# Patient Record
Sex: MALE | Race: WHITE | NOT HISPANIC OR LATINO | Employment: OTHER | URBAN - METROPOLITAN AREA
[De-identification: names, ages, dates, MRNs, and addresses within clinical notes are randomized per-mention and may not be internally consistent; named-entity substitution may affect disease eponyms.]

---

## 2017-08-11 ENCOUNTER — HOSPITAL ENCOUNTER (INPATIENT)
Facility: HOSPITAL | Age: 72
LOS: 3 days | Discharge: HOME | DRG: 394 | End: 2017-08-15
Attending: EMERGENCY MEDICINE | Admitting: INTERNAL MEDICINE
Payer: COMMERCIAL

## 2017-08-11 DIAGNOSIS — R19.7 DIARRHEA: ICD-10-CM

## 2017-08-11 DIAGNOSIS — C78.7 COLON CANCER METASTASIZED TO LIVER (HCC): ICD-10-CM

## 2017-08-11 DIAGNOSIS — C18.9 COLON CANCER METASTASIZED TO LIVER (HCC): ICD-10-CM

## 2017-08-11 DIAGNOSIS — K52.9 COLITIS, ACUTE: Primary | ICD-10-CM

## 2017-08-11 DIAGNOSIS — D72.829 LEUKOCYTOSIS: ICD-10-CM

## 2017-08-11 LAB
ALBUMIN SERPL BCP-MCNC: 3 G/DL (ref 3.5–5)
ALP SERPL-CCNC: 220 U/L (ref 46–116)
ALT SERPL W P-5'-P-CCNC: 26 U/L (ref 12–78)
ANION GAP SERPL CALCULATED.3IONS-SCNC: 12 MMOL/L (ref 4–13)
AST SERPL W P-5'-P-CCNC: 21 U/L (ref 5–45)
BILIRUB SERPL-MCNC: 0.8 MG/DL (ref 0.2–1)
BUN SERPL-MCNC: 24 MG/DL (ref 5–25)
CALCIUM SERPL-MCNC: 8.6 MG/DL (ref 8.3–10.1)
CHLORIDE SERPL-SCNC: 98 MMOL/L (ref 100–108)
CO2 SERPL-SCNC: 24 MMOL/L (ref 21–32)
CREAT SERPL-MCNC: 1.18 MG/DL (ref 0.6–1.3)
DOHLE BOD BLD QL SMEAR: PRESENT
ERYTHROCYTE [DISTWIDTH] IN BLOOD BY AUTOMATED COUNT: 14.2 % (ref 11.6–15.1)
GFR SERPL CREATININE-BSD FRML MDRD: 61 ML/MIN/1.73SQ M
GLUCOSE SERPL-MCNC: 222 MG/DL (ref 65–140)
HCT VFR BLD AUTO: 46.9 % (ref 42–52)
HGB BLD-MCNC: 15.8 G/DL (ref 14–18)
LIPASE SERPL-CCNC: 162 U/L (ref 73–393)
LYMPHOCYTES # BLD AUTO: 0.53 THOUSAND/UL (ref 0.6–4.47)
LYMPHOCYTES # BLD AUTO: 1 %
MCH RBC QN AUTO: 31.7 PG (ref 27–31)
MCHC RBC AUTO-ENTMCNC: 33.8 G/DL (ref 31.4–37.4)
MCV RBC AUTO: 94 FL (ref 82–98)
MONOCYTES # BLD AUTO: 0 THOUSAND/UL (ref 0–1.22)
MONOCYTES NFR BLD AUTO: 0 % (ref 4–12)
NEUTS BAND NFR BLD MANUAL: 3 % (ref 0–8)
NEUTS SEG # BLD: 52.47 THOUSAND/UL (ref 1.81–6.82)
NEUTS SEG NFR BLD AUTO: 96 %
PLATELET # BLD AUTO: 127 THOUSANDS/UL (ref 130–400)
PLATELET BLD QL SMEAR: ABNORMAL
PMV BLD AUTO: 8.3 FL (ref 8.9–12.7)
POTASSIUM SERPL-SCNC: 3.8 MMOL/L (ref 3.5–5.3)
PROT SERPL-MCNC: 6.3 G/DL (ref 6.4–8.2)
RBC # BLD AUTO: 5 MILLION/UL (ref 4.7–6.1)
SODIUM SERPL-SCNC: 134 MMOL/L (ref 136–145)
TOTAL CELLS COUNTED SPEC: 100
TOXIC GRANULES BLD QL SMEAR: PRESENT
TROPONIN I SERPL-MCNC: <0.02 NG/ML
WBC # BLD AUTO: 53 THOUSAND/UL (ref 4.8–10.8)

## 2017-08-11 PROCEDURE — 96365 THER/PROPH/DIAG IV INF INIT: CPT

## 2017-08-11 PROCEDURE — 83690 ASSAY OF LIPASE: CPT | Performed by: EMERGENCY MEDICINE

## 2017-08-11 PROCEDURE — 80053 COMPREHEN METABOLIC PANEL: CPT | Performed by: EMERGENCY MEDICINE

## 2017-08-11 PROCEDURE — 85007 BL SMEAR W/DIFF WBC COUNT: CPT | Performed by: EMERGENCY MEDICINE

## 2017-08-11 PROCEDURE — 36415 COLL VENOUS BLD VENIPUNCTURE: CPT | Performed by: EMERGENCY MEDICINE

## 2017-08-11 PROCEDURE — 84484 ASSAY OF TROPONIN QUANT: CPT | Performed by: EMERGENCY MEDICINE

## 2017-08-11 PROCEDURE — 96375 TX/PRO/DX INJ NEW DRUG ADDON: CPT

## 2017-08-11 PROCEDURE — 93005 ELECTROCARDIOGRAM TRACING: CPT | Performed by: EMERGENCY MEDICINE

## 2017-08-11 PROCEDURE — 85027 COMPLETE CBC AUTOMATED: CPT | Performed by: EMERGENCY MEDICINE

## 2017-08-11 PROCEDURE — 96361 HYDRATE IV INFUSION ADD-ON: CPT

## 2017-08-11 RX ORDER — MORPHINE SULFATE 4 MG/ML
4 INJECTION, SOLUTION INTRAMUSCULAR; INTRAVENOUS ONCE
Status: COMPLETED | OUTPATIENT
Start: 2017-08-11 | End: 2017-08-11

## 2017-08-11 RX ORDER — PROCHLORPERAZINE MALEATE 5 MG/1
5 TABLET ORAL EVERY 8 HOURS PRN
COMMUNITY
End: 2019-12-09

## 2017-08-11 RX ORDER — TRAMADOL HYDROCHLORIDE 50 MG/1
50 TABLET ORAL EVERY 6 HOURS PRN
Status: ON HOLD | COMMUNITY
End: 2017-08-12 | Stop reason: ALTCHOICE

## 2017-08-11 RX ORDER — ONDANSETRON 4 MG/1
4 TABLET, FILM COATED ORAL EVERY 8 HOURS PRN
COMMUNITY
End: 2019-11-08

## 2017-08-11 RX ORDER — ONDANSETRON 2 MG/ML
4 INJECTION INTRAMUSCULAR; INTRAVENOUS ONCE
Status: COMPLETED | OUTPATIENT
Start: 2017-08-11 | End: 2017-08-11

## 2017-08-11 RX ADMIN — SODIUM CHLORIDE 1000 ML: 0.9 INJECTION, SOLUTION INTRAVENOUS at 22:56

## 2017-08-11 RX ADMIN — METRONIDAZOLE 500 MG: 500 INJECTION, SOLUTION INTRAVENOUS at 23:55

## 2017-08-11 RX ADMIN — ONDANSETRON 4 MG: 2 INJECTION INTRAMUSCULAR; INTRAVENOUS at 22:56

## 2017-08-11 RX ADMIN — MORPHINE SULFATE 4 MG: 4 INJECTION, SOLUTION INTRAMUSCULAR; INTRAVENOUS at 22:56

## 2017-08-12 ENCOUNTER — APPOINTMENT (EMERGENCY)
Dept: RADIOLOGY | Facility: HOSPITAL | Age: 72
DRG: 394 | End: 2017-08-12
Payer: COMMERCIAL

## 2017-08-12 PROBLEM — I10 HYPERTENSION: Status: ACTIVE | Noted: 2017-08-12

## 2017-08-12 PROBLEM — C19 METASTATIC COLORECTAL CANCER (HCC): Status: ACTIVE | Noted: 2017-08-12

## 2017-08-12 PROBLEM — D72.829 LEUKOCYTOSIS: Status: ACTIVE | Noted: 2017-08-12

## 2017-08-12 PROBLEM — K52.9 ENTEROCOLITIS: Status: ACTIVE | Noted: 2017-08-12

## 2017-08-12 PROBLEM — R73.9 HYPERGLYCEMIA: Status: ACTIVE | Noted: 2017-08-12

## 2017-08-12 PROBLEM — R19.7 DIARRHEA: Status: ACTIVE | Noted: 2017-08-12

## 2017-08-12 LAB
ANION GAP SERPL CALCULATED.3IONS-SCNC: 7 MMOL/L (ref 4–13)
BACTERIA UR QL AUTO: ABNORMAL /HPF
BASOPHILS # BLD AUTO: 0 THOUSAND/UL (ref 0–0.1)
BASOPHILS NFR MAR MANUAL: 0 % (ref 0–1)
BILIRUB UR QL STRIP: NEGATIVE
BUN SERPL-MCNC: 20 MG/DL (ref 5–25)
CALCIUM SERPL-MCNC: 7.7 MG/DL (ref 8.3–10.1)
CHLORIDE SERPL-SCNC: 103 MMOL/L (ref 100–108)
CLARITY UR: CLEAR
CO2 SERPL-SCNC: 26 MMOL/L (ref 21–32)
COLOR UR: YELLOW
CREAT SERPL-MCNC: 0.9 MG/DL (ref 0.6–1.3)
DOHLE BOD BLD QL SMEAR: PRESENT
EOSINOPHIL # BLD AUTO: 0 THOUSAND/UL (ref 0–0.61)
EOSINOPHIL NFR BLD MANUAL: 0 % (ref 0–6)
ERYTHROCYTE [DISTWIDTH] IN BLOOD BY AUTOMATED COUNT: 14.8 % (ref 11.6–15.1)
EST. AVERAGE GLUCOSE BLD GHB EST-MCNC: 126 MG/DL
GFR SERPL CREATININE-BSD FRML MDRD: 84 ML/MIN/1.73SQ M
GLUCOSE SERPL-MCNC: 135 MG/DL (ref 65–140)
GLUCOSE UR STRIP-MCNC: NEGATIVE MG/DL
HBA1C MFR BLD: 6 % (ref 4.2–6.3)
HCT VFR BLD AUTO: 38.4 % (ref 42–52)
HGB BLD-MCNC: 13.1 G/DL (ref 14–18)
HGB UR QL STRIP.AUTO: ABNORMAL
HOLD SPECIMEN: NORMAL
KETONES UR STRIP-MCNC: ABNORMAL MG/DL
LACTATE SERPL-SCNC: 1.6 MMOL/L (ref 0.5–2)
LEUKOCYTE ESTERASE UR QL STRIP: NEGATIVE
LYMPHOCYTES # BLD AUTO: 0 %
LYMPHOCYTES # BLD AUTO: 0 THOUSAND/UL (ref 0.6–4.47)
MCH RBC QN AUTO: 32.9 PG (ref 27–31)
MCHC RBC AUTO-ENTMCNC: 34.2 G/DL (ref 31.4–37.4)
MCV RBC AUTO: 96 FL (ref 82–98)
MONOCYTES # BLD AUTO: 0 THOUSAND/UL (ref 0–1.22)
MONOCYTES NFR BLD AUTO: 0 % (ref 4–12)
NEUTS BAND NFR BLD MANUAL: 2 % (ref 0–8)
NEUTS SEG # BLD: 49 THOUSAND/UL (ref 1.81–6.82)
NEUTS SEG NFR BLD AUTO: 98 %
NITRITE UR QL STRIP: NEGATIVE
NON-SQ EPI CELLS URNS QL MICRO: ABNORMAL /HPF
NRBC BLD AUTO-RTO: 0 /100 WBCS
PH UR STRIP.AUTO: 5.5 [PH] (ref 5–9)
PLATELET # BLD AUTO: 97 THOUSANDS/UL (ref 130–400)
PLATELET BLD QL SMEAR: ABNORMAL
PMV BLD AUTO: 7.9 FL (ref 8.9–12.7)
POTASSIUM SERPL-SCNC: 4 MMOL/L (ref 3.5–5.3)
PROT UR STRIP-MCNC: NEGATIVE MG/DL
RBC # BLD AUTO: 3.99 MILLION/UL (ref 4.7–6.1)
RBC #/AREA URNS AUTO: ABNORMAL /HPF
SODIUM SERPL-SCNC: 136 MMOL/L (ref 136–145)
SP GR UR STRIP.AUTO: 1.01 (ref 1–1.03)
TOTAL CELLS COUNTED SPEC: 100
UROBILINOGEN UR QL STRIP.AUTO: 0.2 E.U./DL
WBC # BLD AUTO: 49 THOUSAND/UL (ref 4.8–10.8)
WBC #/AREA URNS AUTO: ABNORMAL /HPF
WBC STL QL MICRO: NORMAL

## 2017-08-12 PROCEDURE — 87205 SMEAR GRAM STAIN: CPT | Performed by: NURSE PRACTITIONER

## 2017-08-12 PROCEDURE — 36415 COLL VENOUS BLD VENIPUNCTURE: CPT | Performed by: EMERGENCY MEDICINE

## 2017-08-12 PROCEDURE — 80048 BASIC METABOLIC PNL TOTAL CA: CPT | Performed by: NURSE PRACTITIONER

## 2017-08-12 PROCEDURE — 81001 URINALYSIS AUTO W/SCOPE: CPT | Performed by: EMERGENCY MEDICINE

## 2017-08-12 PROCEDURE — 74177 CT ABD & PELVIS W/CONTRAST: CPT

## 2017-08-12 PROCEDURE — 85027 COMPLETE CBC AUTOMATED: CPT | Performed by: NURSE PRACTITIONER

## 2017-08-12 PROCEDURE — 87040 BLOOD CULTURE FOR BACTERIA: CPT | Performed by: EMERGENCY MEDICINE

## 2017-08-12 PROCEDURE — 99285 EMERGENCY DEPT VISIT HI MDM: CPT

## 2017-08-12 PROCEDURE — 83605 ASSAY OF LACTIC ACID: CPT | Performed by: EMERGENCY MEDICINE

## 2017-08-12 PROCEDURE — 85007 BL SMEAR W/DIFF WBC COUNT: CPT | Performed by: NURSE PRACTITIONER

## 2017-08-12 PROCEDURE — 87493 C DIFF AMPLIFIED PROBE: CPT | Performed by: NURSE PRACTITIONER

## 2017-08-12 PROCEDURE — 87081 CULTURE SCREEN ONLY: CPT | Performed by: NURSE PRACTITIONER

## 2017-08-12 PROCEDURE — 87147 CULTURE TYPE IMMUNOLOGIC: CPT | Performed by: NURSE PRACTITIONER

## 2017-08-12 PROCEDURE — 83036 HEMOGLOBIN GLYCOSYLATED A1C: CPT | Performed by: NURSE PRACTITIONER

## 2017-08-12 RX ORDER — LEVOFLOXACIN 5 MG/ML
500 INJECTION, SOLUTION INTRAVENOUS EVERY 24 HOURS
Status: DISCONTINUED | OUTPATIENT
Start: 2017-08-12 | End: 2017-08-15

## 2017-08-12 RX ORDER — MAGNESIUM HYDROXIDE/ALUMINUM HYDROXICE/SIMETHICONE 120; 1200; 1200 MG/30ML; MG/30ML; MG/30ML
30 SUSPENSION ORAL EVERY 6 HOURS PRN
Status: DISCONTINUED | OUTPATIENT
Start: 2017-08-12 | End: 2017-08-15 | Stop reason: HOSPADM

## 2017-08-12 RX ORDER — ATORVASTATIN CALCIUM 10 MG/1
5 TABLET, FILM COATED ORAL
Status: DISCONTINUED | OUTPATIENT
Start: 2017-08-12 | End: 2017-08-15 | Stop reason: HOSPADM

## 2017-08-12 RX ORDER — ACETAMINOPHEN 325 MG/1
650 TABLET ORAL EVERY 6 HOURS PRN
Status: DISCONTINUED | OUTPATIENT
Start: 2017-08-12 | End: 2017-08-15 | Stop reason: HOSPADM

## 2017-08-12 RX ORDER — ONDANSETRON 2 MG/ML
4 INJECTION INTRAMUSCULAR; INTRAVENOUS EVERY 6 HOURS PRN
Status: DISCONTINUED | OUTPATIENT
Start: 2017-08-12 | End: 2017-08-14

## 2017-08-12 RX ORDER — SODIUM CHLORIDE 9 MG/ML
100 INJECTION, SOLUTION INTRAVENOUS CONTINUOUS
Status: DISCONTINUED | OUTPATIENT
Start: 2017-08-12 | End: 2017-08-13

## 2017-08-12 RX ORDER — SODIUM CHLORIDE 9 MG/ML
125 INJECTION, SOLUTION INTRAVENOUS CONTINUOUS
Status: DISCONTINUED | OUTPATIENT
Start: 2017-08-12 | End: 2017-08-12

## 2017-08-12 RX ADMIN — FAMOTIDINE 20 MG: 10 INJECTION, SOLUTION INTRAVENOUS at 09:16

## 2017-08-12 RX ADMIN — SODIUM CHLORIDE 100 ML/HR: 0.9 INJECTION, SOLUTION INTRAVENOUS at 06:24

## 2017-08-12 RX ADMIN — SODIUM CHLORIDE 100 ML/HR: 0.9 INJECTION, SOLUTION INTRAVENOUS at 13:26

## 2017-08-12 RX ADMIN — ATORVASTATIN CALCIUM 5 MG: 10 TABLET, FILM COATED ORAL at 22:07

## 2017-08-12 RX ADMIN — IOHEXOL 100 ML: 350 INJECTION, SOLUTION INTRAVENOUS at 01:45

## 2017-08-12 RX ADMIN — LEVOFLOXACIN 500 MG: 5 INJECTION, SOLUTION INTRAVENOUS at 06:18

## 2017-08-12 RX ADMIN — METRONIDAZOLE 500 MG: 500 INJECTION, SOLUTION INTRAVENOUS at 09:15

## 2017-08-12 RX ADMIN — METRONIDAZOLE 500 MG: 500 INJECTION, SOLUTION INTRAVENOUS at 17:12

## 2017-08-12 RX ADMIN — FAMOTIDINE 20 MG: 10 INJECTION, SOLUTION INTRAVENOUS at 17:11

## 2017-08-12 RX ADMIN — ENOXAPARIN SODIUM 40 MG: 40 INJECTION SUBCUTANEOUS at 09:16

## 2017-08-12 RX ADMIN — SODIUM CHLORIDE 125 ML/HR: 0.9 INJECTION, SOLUTION INTRAVENOUS at 02:46

## 2017-08-12 RX ADMIN — METRONIDAZOLE 500 MG: 500 INJECTION, SOLUTION INTRAVENOUS at 23:36

## 2017-08-13 LAB
C DIFF TOX GENS STL QL NAA+PROBE: NORMAL
MRSA NOSE QL CULT: ABNORMAL

## 2017-08-13 PROCEDURE — 87045 FECES CULTURE AEROBIC BACT: CPT | Performed by: NURSE PRACTITIONER

## 2017-08-13 PROCEDURE — 87015 SPECIMEN INFECT AGNT CONCNTJ: CPT | Performed by: NURSE PRACTITIONER

## 2017-08-13 PROCEDURE — G8979 MOBILITY GOAL STATUS: HCPCS

## 2017-08-13 PROCEDURE — G8978 MOBILITY CURRENT STATUS: HCPCS

## 2017-08-13 PROCEDURE — 97161 PT EVAL LOW COMPLEX 20 MIN: CPT

## 2017-08-13 PROCEDURE — 87046 STOOL CULTR AEROBIC BACT EA: CPT | Performed by: NURSE PRACTITIONER

## 2017-08-13 RX ADMIN — FAMOTIDINE 20 MG: 10 INJECTION, SOLUTION INTRAVENOUS at 08:46

## 2017-08-13 RX ADMIN — MUPIROCIN 1 APPLICATION: 20 OINTMENT TOPICAL at 14:57

## 2017-08-13 RX ADMIN — LEVOFLOXACIN 500 MG: 5 INJECTION, SOLUTION INTRAVENOUS at 05:28

## 2017-08-13 RX ADMIN — SODIUM CHLORIDE 100 ML/HR: 0.9 INJECTION, SOLUTION INTRAVENOUS at 01:50

## 2017-08-13 RX ADMIN — FAMOTIDINE 20 MG: 10 INJECTION, SOLUTION INTRAVENOUS at 17:44

## 2017-08-13 RX ADMIN — ATORVASTATIN CALCIUM 5 MG: 10 TABLET, FILM COATED ORAL at 22:25

## 2017-08-13 RX ADMIN — METRONIDAZOLE 500 MG: 500 INJECTION, SOLUTION INTRAVENOUS at 23:56

## 2017-08-13 RX ADMIN — METRONIDAZOLE 500 MG: 500 INJECTION, SOLUTION INTRAVENOUS at 08:47

## 2017-08-13 RX ADMIN — METRONIDAZOLE 500 MG: 500 INJECTION, SOLUTION INTRAVENOUS at 16:43

## 2017-08-13 RX ADMIN — ENOXAPARIN SODIUM 40 MG: 40 INJECTION SUBCUTANEOUS at 08:46

## 2017-08-13 RX ADMIN — MUPIROCIN 1 APPLICATION: 20 OINTMENT TOPICAL at 22:25

## 2017-08-14 ENCOUNTER — APPOINTMENT (INPATIENT)
Dept: OCCUPATIONAL THERAPY | Facility: HOSPITAL | Age: 72
DRG: 394 | End: 2017-08-14
Payer: COMMERCIAL

## 2017-08-14 ENCOUNTER — APPOINTMENT (INPATIENT)
Dept: PHYSICAL THERAPY | Facility: HOSPITAL | Age: 72
DRG: 394 | End: 2017-08-14
Payer: COMMERCIAL

## 2017-08-14 LAB
ANION GAP SERPL CALCULATED.3IONS-SCNC: 6 MMOL/L (ref 4–13)
BUN SERPL-MCNC: 12 MG/DL (ref 5–25)
CALCIUM SERPL-MCNC: 7.2 MG/DL (ref 8.3–10.1)
CHLORIDE SERPL-SCNC: 107 MMOL/L (ref 100–108)
CO2 SERPL-SCNC: 26 MMOL/L (ref 21–32)
CREAT SERPL-MCNC: 0.88 MG/DL (ref 0.6–1.3)
DOHLE BOD BLD QL SMEAR: PRESENT
ERYTHROCYTE [DISTWIDTH] IN BLOOD BY AUTOMATED COUNT: 14.7 % (ref 11.6–15.1)
GFR SERPL CREATININE-BSD FRML MDRD: 85 ML/MIN/1.73SQ M
GLUCOSE SERPL-MCNC: 126 MG/DL (ref 65–140)
HCT VFR BLD AUTO: 37 % (ref 42–52)
HGB BLD-MCNC: 12.6 G/DL (ref 14–18)
LYMPHOCYTES # BLD AUTO: 0.57 THOUSAND/UL (ref 0.6–4.47)
LYMPHOCYTES # BLD AUTO: 5 %
MACROCYTES BLD QL AUTO: PRESENT
MAGNESIUM SERPL-MCNC: 2 MG/DL (ref 1.6–2.6)
MCH RBC QN AUTO: 32.6 PG (ref 27–31)
MCHC RBC AUTO-ENTMCNC: 34 G/DL (ref 31.4–37.4)
MCV RBC AUTO: 96 FL (ref 82–98)
MONOCYTES # BLD AUTO: 0.68 THOUSAND/UL (ref 0–1.22)
MONOCYTES NFR BLD AUTO: 6 % (ref 4–12)
NEUTS BAND NFR BLD MANUAL: 20 % (ref 0–8)
NEUTS SEG # BLD: 10.06 THOUSAND/UL (ref 1.81–6.82)
NEUTS SEG NFR BLD AUTO: 69 %
NRBC BLD AUTO-RTO: 0 /100 WBCS
PHOSPHATE SERPL-MCNC: 2.2 MG/DL (ref 2.3–4.1)
PLATELET # BLD AUTO: 83 THOUSANDS/UL (ref 130–400)
PLATELET BLD QL SMEAR: ABNORMAL
PMV BLD AUTO: 8.1 FL (ref 8.9–12.7)
POTASSIUM SERPL-SCNC: 3.7 MMOL/L (ref 3.5–5.3)
RBC # BLD AUTO: 3.86 MILLION/UL (ref 4.7–6.1)
SODIUM SERPL-SCNC: 139 MMOL/L (ref 136–145)
TOTAL CELLS COUNTED SPEC: 100
TOXIC GRANULES BLD QL SMEAR: PRESENT
WBC # BLD AUTO: 11.3 THOUSAND/UL (ref 4.8–10.8)

## 2017-08-14 PROCEDURE — 97165 OT EVAL LOW COMPLEX 30 MIN: CPT

## 2017-08-14 PROCEDURE — 85007 BL SMEAR W/DIFF WBC COUNT: CPT | Performed by: INTERNAL MEDICINE

## 2017-08-14 PROCEDURE — 85027 COMPLETE CBC AUTOMATED: CPT | Performed by: INTERNAL MEDICINE

## 2017-08-14 PROCEDURE — 80048 BASIC METABOLIC PNL TOTAL CA: CPT | Performed by: INTERNAL MEDICINE

## 2017-08-14 PROCEDURE — 84100 ASSAY OF PHOSPHORUS: CPT | Performed by: INTERNAL MEDICINE

## 2017-08-14 PROCEDURE — G8987 SELF CARE CURRENT STATUS: HCPCS

## 2017-08-14 PROCEDURE — 83735 ASSAY OF MAGNESIUM: CPT | Performed by: INTERNAL MEDICINE

## 2017-08-14 PROCEDURE — G8988 SELF CARE GOAL STATUS: HCPCS

## 2017-08-14 RX ORDER — LOPERAMIDE HYDROCHLORIDE 2 MG/1
2 CAPSULE ORAL EVERY 4 HOURS PRN
Status: DISCONTINUED | OUTPATIENT
Start: 2017-08-14 | End: 2017-08-15 | Stop reason: HOSPADM

## 2017-08-14 RX ORDER — POTASSIUM CHLORIDE 20 MEQ/1
40 TABLET, EXTENDED RELEASE ORAL ONCE
Status: COMPLETED | OUTPATIENT
Start: 2017-08-14 | End: 2017-08-14

## 2017-08-14 RX ADMIN — MUPIROCIN 1 APPLICATION: 20 OINTMENT TOPICAL at 21:37

## 2017-08-14 RX ADMIN — METRONIDAZOLE 500 MG: 500 INJECTION, SOLUTION INTRAVENOUS at 08:44

## 2017-08-14 RX ADMIN — LEVOFLOXACIN 500 MG: 5 INJECTION, SOLUTION INTRAVENOUS at 05:20

## 2017-08-14 RX ADMIN — ENOXAPARIN SODIUM 40 MG: 40 INJECTION SUBCUTANEOUS at 08:44

## 2017-08-14 RX ADMIN — FAMOTIDINE 20 MG: 10 INJECTION, SOLUTION INTRAVENOUS at 08:44

## 2017-08-14 RX ADMIN — ATORVASTATIN CALCIUM 5 MG: 10 TABLET, FILM COATED ORAL at 21:58

## 2017-08-14 RX ADMIN — DIBASIC SODIUM PHOSPHATE, MONOBASIC POTASSIUM PHOSPHATE AND MONOBASIC SODIUM PHOSPHATE 2 TABLET: 852; 155; 130 TABLET ORAL at 10:49

## 2017-08-14 RX ADMIN — LOPERAMIDE HYDROCHLORIDE 2 MG: 2 CAPSULE ORAL at 22:15

## 2017-08-14 RX ADMIN — MUPIROCIN 1 APPLICATION: 20 OINTMENT TOPICAL at 08:44

## 2017-08-14 RX ADMIN — POTASSIUM CHLORIDE 40 MEQ: 1500 TABLET, EXTENDED RELEASE ORAL at 10:49

## 2017-08-14 RX ADMIN — FAMOTIDINE 20 MG: 10 INJECTION, SOLUTION INTRAVENOUS at 18:11

## 2017-08-14 RX ADMIN — LOPERAMIDE HYDROCHLORIDE 2 MG: 2 CAPSULE ORAL at 18:14

## 2017-08-15 VITALS
OXYGEN SATURATION: 98 % | BODY MASS INDEX: 22.37 KG/M2 | TEMPERATURE: 97.2 F | SYSTOLIC BLOOD PRESSURE: 115 MMHG | HEIGHT: 72 IN | DIASTOLIC BLOOD PRESSURE: 62 MMHG | HEART RATE: 89 BPM | WEIGHT: 165.12 LBS | RESPIRATION RATE: 18 BRPM

## 2017-08-15 LAB
BACTERIA STL CULT: NORMAL
BACTERIA STL CULT: NORMAL

## 2017-08-15 RX ORDER — LOPERAMIDE HYDROCHLORIDE 2 MG/1
2 CAPSULE ORAL EVERY 4 HOURS PRN
Qty: 10 CAPSULE | Refills: 0 | Status: SHIPPED | OUTPATIENT
Start: 2017-08-15 | End: 2019-11-08

## 2017-08-15 RX ORDER — MAGNESIUM HYDROXIDE/ALUMINUM HYDROXICE/SIMETHICONE 120; 1200; 1200 MG/30ML; MG/30ML; MG/30ML
30 SUSPENSION ORAL EVERY 6 HOURS PRN
Qty: 355 ML | Refills: 0 | Status: SHIPPED | OUTPATIENT
Start: 2017-08-15 | End: 2019-11-08

## 2017-08-15 RX ADMIN — ENOXAPARIN SODIUM 40 MG: 40 INJECTION SUBCUTANEOUS at 08:12

## 2017-08-15 RX ADMIN — FAMOTIDINE 20 MG: 10 INJECTION, SOLUTION INTRAVENOUS at 08:13

## 2017-08-15 RX ADMIN — LOPERAMIDE HYDROCHLORIDE 2 MG: 2 CAPSULE ORAL at 07:45

## 2017-08-15 RX ADMIN — MUPIROCIN 1 APPLICATION: 20 OINTMENT TOPICAL at 08:13

## 2017-08-15 RX ADMIN — LEVOFLOXACIN 500 MG: 5 INJECTION, SOLUTION INTRAVENOUS at 05:34

## 2017-08-16 LAB
ATRIAL RATE: 68 BPM
P AXIS: 72 DEGREES
PR INTERVAL: 116 MS
QRS AXIS: 51 DEGREES
QRSD INTERVAL: 94 MS
QT INTERVAL: 420 MS
QTC INTERVAL: 446 MS
T WAVE AXIS: 65 DEGREES
VENTRICULAR RATE: 68 BPM

## 2017-08-17 LAB
BACTERIA BLD CULT: NORMAL
BACTERIA BLD CULT: NORMAL

## 2017-12-30 ENCOUNTER — LAB REQUISITION (OUTPATIENT)
Dept: LAB | Facility: HOSPITAL | Age: 72
End: 2017-12-30

## 2017-12-30 DIAGNOSIS — R53.1 WEAKNESS: ICD-10-CM

## 2017-12-30 DIAGNOSIS — E46 PROTEIN-CALORIE MALNUTRITION (HCC): ICD-10-CM

## 2017-12-30 DIAGNOSIS — R19.7 DIARRHEA: ICD-10-CM

## 2017-12-30 DIAGNOSIS — D64.9 ANEMIA: ICD-10-CM

## 2017-12-30 LAB
ANION GAP SERPL CALCULATED.3IONS-SCNC: 9 MMOL/L (ref 4–13)
BUN SERPL-MCNC: 18 MG/DL (ref 5–25)
CALCIUM SERPL-MCNC: 7.9 MG/DL (ref 8.3–10.1)
CHLORIDE SERPL-SCNC: 105 MMOL/L (ref 100–108)
CO2 SERPL-SCNC: 26 MMOL/L (ref 21–32)
CREAT SERPL-MCNC: 1.24 MG/DL (ref 0.6–1.3)
ERYTHROCYTE [DISTWIDTH] IN BLOOD BY AUTOMATED COUNT: 16 % (ref 11.6–15.1)
GFR SERPL CREATININE-BSD FRML MDRD: 57 ML/MIN/1.73SQ M
GLUCOSE SERPL-MCNC: 125 MG/DL (ref 65–140)
HCT VFR BLD AUTO: 30.2 % (ref 42–52)
HGB BLD-MCNC: 10.1 G/DL (ref 14–18)
MCH RBC QN AUTO: 33.7 PG (ref 27–31)
MCHC RBC AUTO-ENTMCNC: 33.6 G/DL (ref 31.4–37.4)
MCV RBC AUTO: 100 FL (ref 82–98)
PLATELET # BLD AUTO: 229 THOUSANDS/UL (ref 130–400)
PMV BLD AUTO: 8.3 FL (ref 8.9–12.7)
POTASSIUM SERPL-SCNC: 3 MMOL/L (ref 3.5–5.3)
RBC # BLD AUTO: 3.01 MILLION/UL (ref 4.7–6.1)
SODIUM SERPL-SCNC: 140 MMOL/L (ref 136–145)
WBC # BLD AUTO: 6.5 THOUSAND/UL (ref 4.8–10.8)

## 2017-12-30 PROCEDURE — 85027 COMPLETE CBC AUTOMATED: CPT | Performed by: NURSE PRACTITIONER

## 2017-12-30 PROCEDURE — 80048 BASIC METABOLIC PNL TOTAL CA: CPT | Performed by: NURSE PRACTITIONER

## 2018-02-01 ENCOUNTER — APPOINTMENT (OUTPATIENT)
Dept: LAB | Facility: CLINIC | Age: 73
End: 2018-02-01
Payer: COMMERCIAL

## 2018-02-01 ENCOUNTER — TRANSCRIBE ORDERS (OUTPATIENT)
Dept: LAB | Facility: CLINIC | Age: 73
End: 2018-02-01

## 2018-02-01 DIAGNOSIS — K21.9 GASTROESOPHAGEAL REFLUX DISEASE, ESOPHAGITIS PRESENCE NOT SPECIFIED: ICD-10-CM

## 2018-02-01 DIAGNOSIS — D63.8 CHRONIC DISEASE ANEMIA: ICD-10-CM

## 2018-02-01 DIAGNOSIS — E78.00 PURE HYPERCHOLESTEROLEMIA: Primary | ICD-10-CM

## 2018-02-01 DIAGNOSIS — C21.0 MALIGNANT NEOPLASM OF ANUS (HCC): ICD-10-CM

## 2018-02-01 LAB
ALBUMIN SERPL BCP-MCNC: 3 G/DL (ref 3.5–5)
ALP SERPL-CCNC: 81 U/L (ref 46–116)
ALT SERPL W P-5'-P-CCNC: 22 U/L (ref 12–78)
ANION GAP SERPL CALCULATED.3IONS-SCNC: 3 MMOL/L (ref 4–13)
AST SERPL W P-5'-P-CCNC: 22 U/L (ref 5–45)
BILIRUB SERPL-MCNC: 0.41 MG/DL (ref 0.2–1)
BUN SERPL-MCNC: 16 MG/DL (ref 5–25)
CALCIUM SERPL-MCNC: 8.6 MG/DL (ref 8.3–10.1)
CHLORIDE SERPL-SCNC: 107 MMOL/L (ref 100–108)
CO2 SERPL-SCNC: 29 MMOL/L (ref 21–32)
CREAT SERPL-MCNC: 0.66 MG/DL (ref 0.6–1.3)
ERYTHROCYTE [DISTWIDTH] IN BLOOD BY AUTOMATED COUNT: 17.9 % (ref 11.6–15.1)
GFR SERPL CREATININE-BSD FRML MDRD: 95 ML/MIN/1.73SQ M
GLUCOSE SERPL-MCNC: 92 MG/DL (ref 65–140)
HCT VFR BLD AUTO: 33.3 % (ref 36.5–49.3)
HGB BLD-MCNC: 11.2 G/DL (ref 12–17)
MCH RBC QN AUTO: 35 PG (ref 26.8–34.3)
MCHC RBC AUTO-ENTMCNC: 33.6 G/DL (ref 31.4–37.4)
MCV RBC AUTO: 104 FL (ref 82–98)
PLATELET # BLD AUTO: 365 THOUSANDS/UL (ref 149–390)
PMV BLD AUTO: 10 FL (ref 8.9–12.7)
POTASSIUM SERPL-SCNC: 4.7 MMOL/L (ref 3.5–5.3)
PROT SERPL-MCNC: 6.5 G/DL (ref 6.4–8.2)
RBC # BLD AUTO: 3.2 MILLION/UL (ref 3.88–5.62)
SODIUM SERPL-SCNC: 139 MMOL/L (ref 136–145)
WBC # BLD AUTO: 5.99 THOUSAND/UL (ref 4.31–10.16)

## 2018-02-01 PROCEDURE — 36415 COLL VENOUS BLD VENIPUNCTURE: CPT

## 2018-02-01 PROCEDURE — 85027 COMPLETE CBC AUTOMATED: CPT

## 2018-02-01 PROCEDURE — 80053 COMPREHEN METABOLIC PANEL: CPT

## 2019-07-29 ENCOUNTER — OFFICE VISIT (OUTPATIENT)
Dept: OBGYN CLINIC | Facility: CLINIC | Age: 74
End: 2019-07-29
Payer: COMMERCIAL

## 2019-07-29 VITALS
SYSTOLIC BLOOD PRESSURE: 121 MMHG | BODY MASS INDEX: 24.16 KG/M2 | DIASTOLIC BLOOD PRESSURE: 62 MMHG | HEART RATE: 61 BPM | HEIGHT: 72 IN | WEIGHT: 178.4 LBS

## 2019-07-29 DIAGNOSIS — M65.332 TRIGGER MIDDLE FINGER OF LEFT HAND: Primary | ICD-10-CM

## 2019-07-29 DIAGNOSIS — M65.322 TRIGGER INDEX FINGER OF LEFT HAND: ICD-10-CM

## 2019-07-29 PROCEDURE — 99203 OFFICE O/P NEW LOW 30 MIN: CPT | Performed by: ORTHOPAEDIC SURGERY

## 2019-07-29 PROCEDURE — 20550 NJX 1 TENDON SHEATH/LIGAMENT: CPT | Performed by: ORTHOPAEDIC SURGERY

## 2019-07-29 RX ORDER — TRIAMCINOLONE ACETONIDE 40 MG/ML
20 INJECTION, SUSPENSION INTRA-ARTICULAR; INTRAMUSCULAR
Status: COMPLETED | OUTPATIENT
Start: 2019-07-29 | End: 2019-07-29

## 2019-07-29 RX ORDER — FOLIC ACID 1 MG/1
TABLET ORAL DAILY
COMMUNITY

## 2019-07-29 RX ORDER — LIDOCAINE HYDROCHLORIDE 5 MG/ML
0.5 INJECTION, SOLUTION INFILTRATION; PERINEURAL
Status: COMPLETED | OUTPATIENT
Start: 2019-07-29 | End: 2019-07-29

## 2019-07-29 RX ORDER — CAPECITABINE 500 MG/1
500 TABLET, FILM COATED ORAL 2 TIMES DAILY
COMMUNITY

## 2019-07-29 RX ADMIN — LIDOCAINE HYDROCHLORIDE 0.5 ML: 5 INJECTION, SOLUTION INFILTRATION; PERINEURAL at 12:20

## 2019-07-29 RX ADMIN — TRIAMCINOLONE ACETONIDE 20 MG: 40 INJECTION, SUSPENSION INTRA-ARTICULAR; INTRAMUSCULAR at 12:20

## 2019-07-29 NOTE — PROGRESS NOTES
Assessment/Plan:  1  Trigger middle finger of left hand  Hand/upper extremity injection: L long A1   2  Trigger index finger of left hand         Scribe Attestation    I,:   Pattie Okeefe MA am acting as a scribe while in the presence of the attending physician :        I,:   Luz Dong DO personally performed the services described in this documentation    as scribed in my presence :              I discussed with Rigo Alan today that his signs and symptoms are consistent with left long and index finger trigger finger  Patient is tender to palpation over the A1 pulley's  There is obvious triggering of the long finger  Treatment options were discussed in the form of a steroid injection  Patient was agreeable to this  A left long finger trigger finger injection was performed in the office today without any complications  He will follow up in 3 months for repeat evaluation  Subjective:   Paul Falcon is a 74 y o  male who presents to the office today for evaluation of left hand pain  Patient notes locking to the left long finger  Patient states this has been ongoing for a while and progressively gotten worse  Patient states this is usually worse in the morning  Patient also notes pain to the base of the left index MCP  Patient states this has been ongoing since the winter  He denies any numbness or tingling  He denies any known injury  Review of Systems   Constitutional: Negative for chills and fever  HENT: Negative for drooling and sneezing  Eyes: Negative for redness  Respiratory: Negative for cough and wheezing  Gastrointestinal: Negative for nausea and vomiting  Musculoskeletal: Positive for arthralgias  Negative for joint swelling and myalgias  Skin: Positive for wound  Neurological: Negative for weakness and numbness  Psychiatric/Behavioral: Negative for behavioral problems  The patient is not nervous/anxious            Past Medical History:   Diagnosis Date    Cancer[facility] colon ([facility] [address] )     Heart murmur     HLD (hyperlipidemia)     border line    Hx of radiation therapy     Portacath in place     right chest wall     Rectal cancer [facility])        Past Surgical History:   Procedure Laterality Date    LIVER BIOPSY      in 5/2017    PORTACATH PLACEMENT      in 2016       Family History   Problem Relation Age of Onset    No Known Problems Mother     No Known Problems Father        Social History     Occupational History    Not on file   Tobacco Use    Smoking status: Former Smoker     Packs/day: 1 00     Years: 10 00     Pack years: 10 00    Smokeless tobacco: Never Used    Tobacco comment: quit 20 years ago   Substance and Sexual Activity    Alcohol use: Yes     Comment: occasionally       Drug use: Never    Sexual activity: Not on file         Current Outpatient Medications:     ATORVASTATIN CALCIUM PO, Take 10 mg by mouth daily 1/2 tablet daily , dose unknown not taken in awhile  , Disp: , Rfl:     capecitabine (XELODA) 500 MG tablet, capecitabine 500 mg tablet  Take by oral route , Disp: , Rfl:     folic acid (FOLVITE) 1 mg tablet, Take by mouth daily, Disp: , Rfl:     Potassium 75 MG TABS, Take by mouth, Disp: , Rfl:     aluminum-magnesium hydroxide-simethicone (MYLANTA) 200-200-20 mg/5 mL suspension, Take 30 mL by mouth every 6 (six) hours as needed for indigestion or heartburn (Patient not taking: Reported on 7/29/2019), Disp: 355 mL, Rfl: 0    loperamide (IMODIUM) 2 mg capsule, Take 1 capsule by mouth every 4 (four) hours as needed for diarrhea (Patient not taking: Reported on 7/29/2019), Disp: 10 capsule, Rfl: 0    ondansetron (ZOFRAN) 4 mg tablet, Take 4 mg by mouth every 8 (eight) hours as needed for nausea or vomiting, Disp: , Rfl:     prochlorperazine (COMPAZINE) 5 mg tablet, Take 5 mg by mouth every 8 (eight) hours as needed for nausea or vomiting, Disp: , Rfl:     No Known Allergies    Objective:  Vitals:    07/29/19 1152   BP: 121/62   Pulse: 61       Ortho Exam     Left hand    TTP A1 pulley long finger  Obvious triggering long finger   Nodule A1 pulley index finger  Mild TTP A1 pulley index finger   Compartments soft  Brisk capillary refill  Sensation intact median, radial, and ulnar nerve     Physical Exam   Constitutional: He is oriented to person, place, and time  He appears well-developed and well-nourished  HENT:   Head: Normocephalic and atraumatic  Eyes: Conjunctivae are normal  Right eye exhibits no discharge  Left eye exhibits no discharge  Neck: Normal range of motion  Neck supple  Cardiovascular: Normal rate and intact distal pulses  Pulmonary/Chest: Effort normal  No respiratory distress  Musculoskeletal:   As noted in HPI   Neurological: He is alert and oriented to person, place, and time  Skin: Skin is warm and dry  Psychiatric: He has a normal mood and affect   His behavior is normal  Judgment and thought content normal    Hand/upper extremity injection: L long A1  Date/Time: 7/29/2019 12:20 PM  Consent given by: patient  Site marked: site marked  Timeout: Immediately prior to procedure a time out was called to verify the correct patient, procedure, equipment, support staff and site/side marked as required   Supporting Documentation  Indications: pain   Procedure Details  Condition:trigger finger Location: long finger - L long A1   Preparation: Patient was prepped and draped in the usual sterile fashion  Needle size: 27 G  Ultrasound guidance: no  Medications administered: 0 5 mL lidocaine 0 5 %; 20 mg triamcinolone acetonide 40 mg/mL    Patient tolerance: patient tolerated the procedure well with no immediate complications  Dressing:  Sterile dressing applied

## 2019-10-29 ENCOUNTER — OFFICE VISIT (OUTPATIENT)
Dept: OBGYN CLINIC | Facility: CLINIC | Age: 74
End: 2019-10-29
Payer: COMMERCIAL

## 2019-10-29 VITALS
DIASTOLIC BLOOD PRESSURE: 77 MMHG | HEIGHT: 72 IN | SYSTOLIC BLOOD PRESSURE: 132 MMHG | BODY MASS INDEX: 24.54 KG/M2 | WEIGHT: 181.2 LBS | HEART RATE: 66 BPM

## 2019-10-29 DIAGNOSIS — M65.332 TRIGGER MIDDLE FINGER OF LEFT HAND: Primary | ICD-10-CM

## 2019-10-29 PROCEDURE — 99213 OFFICE O/P EST LOW 20 MIN: CPT | Performed by: ORTHOPAEDIC SURGERY

## 2019-10-29 RX ORDER — THIAMINE MONONITRATE (VIT B1) 100 MG
100 TABLET ORAL DAILY
COMMUNITY
End: 2019-12-09

## 2019-10-29 RX ORDER — GLUCOSAMINE HCL 500 MG
TABLET ORAL
COMMUNITY

## 2019-10-29 NOTE — PROGRESS NOTES
Assessment/Plan:  No diagnosis found  Scribe Attestation    I,:   Pattie Okeefe MA am acting as a scribe while in the presence of the attending physician :        I,:   Garry Mujica DO personally performed the services described in this documentation    as scribed in my presence :              Vidhya Negrete is doing well  He is still seeing good relief from previous steroid injection  He is non tender to palpation over the A1 pulley's  There is no triggering with range of motion  We will hold off on a repeat injection today  He may follow up with me as needed  Subjective:   Gretchen Solis is a 66 y o  male who presents to the office today for follow up evaluation left long and index finger trigger finger  Patient underwent a long finger injection at his last visit on 7/29/19 which he states provided him with good relief in his symptoms  He denies any triggering  He denies any pain  He denies any numbness or tingling  Review of Systems   Constitutional: Negative for chills and fever  HENT: Negative for drooling and sneezing  Eyes: Negative for redness  Respiratory: Negative for cough and wheezing  Gastrointestinal: Negative for nausea and vomiting  Musculoskeletal: Positive for arthralgias  Negative for joint swelling and myalgias  Skin: Positive for rash  Neurological: Negative for weakness and numbness  Psychiatric/Behavioral: Negative for behavioral problems  The patient is not nervous/anxious            Past Medical History:   Diagnosis Date    Cancer, colon ([address] )     Heart murmur     HLD (hyperlipidemia)     border line    Hx of radiation therapy     Portacath in place     right chest wall     Rect[facility])        Past Surgical History:   Procedure Laterality Date    LIVER BIOPSY      in 5/2017    PORTACATH PLACEMENT      in 2016       Family History   Problem Relation Age of Onset    No Known Problems Mother     No Known Problems Father        Social History Occupational History    Not on file   Tobacco Use    Smoking status: Former Smoker     Packs/day: 1 00     Years: 10 00     Pack years: 10 00    Smokeless tobacco: Never Used    Tobacco comment: quit 20 years ago   Substance and Sexual Activity    Alcohol use: Yes     Comment: occasionally   Drug use: Never    Sexual activity: Not on file         Current Outpatient Medications:     aluminum-magnesium hydroxide-simethicone (MYLANTA) 200-200-20 mg/5 mL suspension, Take 30 mL by mouth every 6 (six) hours as needed for indigestion or heartburn (Patient not taking: Reported on 7/29/2019), Disp: 355 mL, Rfl: 0    ATORVASTATIN CALCIUM PO, Take 10 mg by mouth daily 1/2 tablet daily , dose unknown not taken in awhile  , Disp: , Rfl:     capecitabine (XELODA) 500 MG tablet, capecitabine 500 mg tablet  Take by oral route , Disp: , Rfl:     folic acid (FOLVITE) 1 mg tablet, Take by mouth daily, Disp: , Rfl:     loperamide (IMODIUM) 2 mg capsule, Take 1 capsule by mouth every 4 (four) hours as needed for diarrhea (Patient not taking: Reported on 7/29/2019), Disp: 10 capsule, Rfl: 0    ondansetron (ZOFRAN) 4 mg tablet, Take 4 mg by mouth every 8 (eight) hours as needed for nausea or vomiting, Disp: , Rfl:     Potassium 75 MG TABS, Take by mouth, Disp: , Rfl:     prochlorperazine (COMPAZINE) 5 mg tablet, Take 5 mg by mouth every 8 (eight) hours as needed for nausea or vomiting, Disp: , Rfl:     No Known Allergies    Objective: There were no vitals filed for this visit  Ortho Exam     Left hand    NTTP A1 pulley's  No triggering with ROM  Full ROM  Full fist   Compartments soft  Brisk capillary refill  S/m intact median, radial, and ulnar nerve     Physical Exam   Constitutional: He is oriented to person, place, and time  He appears well-developed and well-nourished  HENT:   Head: Normocephalic and atraumatic  Eyes: Conjunctivae are normal  Right eye exhibits no discharge   Left eye exhibits no discharge  Neck: Normal range of motion  Neck supple  Cardiovascular: Normal rate and intact distal pulses  Pulmonary/Chest: Effort normal  No respiratory distress  Musculoskeletal:   As noted in HPI   Neurological: He is alert and oriented to person, place, and time  Skin: Skin is warm and dry  Psychiatric: He has a normal mood and affect   His behavior is normal  Judgment and thought content normal

## 2019-11-10 PROBLEM — J06.9 VIRAL UPPER RESPIRATORY TRACT INFECTION: Status: ACTIVE | Noted: 2019-11-10

## 2020-05-20 PROBLEM — S40.022A BRUISE OF BOTH ARMS: Status: ACTIVE | Noted: 2020-05-20

## 2020-05-20 PROBLEM — S40.021A BRUISE OF BOTH ARMS: Status: ACTIVE | Noted: 2020-05-20

## 2020-05-20 PROBLEM — K52.9 ENTEROCOLITIS: Status: RESOLVED | Noted: 2017-08-12 | Resolved: 2020-05-20

## 2020-05-20 PROBLEM — E78.00 HYPERCHOLESTEREMIA: Status: ACTIVE | Noted: 2020-05-20

## 2020-05-20 PROBLEM — J06.9 VIRAL UPPER RESPIRATORY TRACT INFECTION: Status: RESOLVED | Noted: 2019-11-10 | Resolved: 2020-05-20

## 2020-08-21 PROBLEM — E83.119 HEMOCHROMATOSIS: Status: ACTIVE | Noted: 2020-08-21

## 2020-08-21 PROBLEM — L03.116 CELLULITIS OF LEFT LEG: Status: ACTIVE | Noted: 2020-08-21

## 2020-12-04 PROBLEM — L03.116 CELLULITIS OF LEFT LEG: Status: RESOLVED | Noted: 2020-08-21 | Resolved: 2020-12-04

## 2020-12-20 PROBLEM — M81.0 AGE RELATED OSTEOPOROSIS: Status: ACTIVE | Noted: 2020-12-20

## 2020-12-20 PROBLEM — E55.9 VITAMIN D DEFICIENCY: Status: ACTIVE | Noted: 2020-12-20

## 2021-01-21 ENCOUNTER — IMMUNIZATIONS (OUTPATIENT)
Dept: FAMILY MEDICINE CLINIC | Facility: HOSPITAL | Age: 76
End: 2021-01-21

## 2021-01-21 DIAGNOSIS — Z23 ENCOUNTER FOR IMMUNIZATION: Primary | ICD-10-CM

## 2021-01-21 PROCEDURE — 0011A SARS-COV-2 / COVID-19 MRNA VACCINE (MODERNA) 100 MCG: CPT

## 2021-01-21 PROCEDURE — 91301 SARS-COV-2 / COVID-19 MRNA VACCINE (MODERNA) 100 MCG: CPT

## 2021-02-19 ENCOUNTER — IMMUNIZATIONS (OUTPATIENT)
Dept: FAMILY MEDICINE CLINIC | Facility: HOSPITAL | Age: 76
End: 2021-02-19

## 2021-02-19 DIAGNOSIS — Z23 ENCOUNTER FOR IMMUNIZATION: Primary | ICD-10-CM

## 2021-02-19 PROCEDURE — 91301 SARS-COV-2 / COVID-19 MRNA VACCINE (MODERNA) 100 MCG: CPT

## 2021-02-19 PROCEDURE — 0012A SARS-COV-2 / COVID-19 MRNA VACCINE (MODERNA) 100 MCG: CPT

## 2021-03-04 ENCOUNTER — OFFICE VISIT (OUTPATIENT)
Dept: INTERNAL MEDICINE CLINIC | Facility: CLINIC | Age: 76
End: 2021-03-04
Payer: COMMERCIAL

## 2021-03-04 VITALS
HEIGHT: 72 IN | BODY MASS INDEX: 24.24 KG/M2 | TEMPERATURE: 96.7 F | WEIGHT: 179 LBS | OXYGEN SATURATION: 99 % | DIASTOLIC BLOOD PRESSURE: 80 MMHG | HEART RATE: 70 BPM | SYSTOLIC BLOOD PRESSURE: 130 MMHG

## 2021-03-04 DIAGNOSIS — E55.9 VITAMIN D DEFICIENCY: ICD-10-CM

## 2021-03-04 DIAGNOSIS — E78.00 HYPERCHOLESTEREMIA: Primary | ICD-10-CM

## 2021-03-04 DIAGNOSIS — C19 METASTATIC COLORECTAL CANCER (HCC): ICD-10-CM

## 2021-03-04 DIAGNOSIS — M81.0 AGE RELATED OSTEOPOROSIS, UNSPECIFIED PATHOLOGICAL FRACTURE PRESENCE: ICD-10-CM

## 2021-03-04 DIAGNOSIS — E83.119 HEMOCHROMATOSIS, UNSPECIFIED HEMOCHROMATOSIS TYPE: ICD-10-CM

## 2021-03-04 DIAGNOSIS — Z00.00 MEDICARE ANNUAL WELLNESS VISIT, SUBSEQUENT: ICD-10-CM

## 2021-03-04 DIAGNOSIS — I10 ESSENTIAL HYPERTENSION: ICD-10-CM

## 2021-03-04 PROCEDURE — 1170F FXNL STATUS ASSESSED: CPT | Performed by: INTERNAL MEDICINE

## 2021-03-04 PROCEDURE — G0439 PPPS, SUBSEQ VISIT: HCPCS | Performed by: INTERNAL MEDICINE

## 2021-03-04 PROCEDURE — 99214 OFFICE O/P EST MOD 30 MIN: CPT | Performed by: INTERNAL MEDICINE

## 2021-03-04 PROCEDURE — 3725F SCREEN DEPRESSION PERFORMED: CPT | Performed by: INTERNAL MEDICINE

## 2021-03-04 PROCEDURE — 3288F FALL RISK ASSESSMENT DOCD: CPT | Performed by: INTERNAL MEDICINE

## 2021-03-04 PROCEDURE — 1036F TOBACCO NON-USER: CPT | Performed by: INTERNAL MEDICINE

## 2021-03-04 PROCEDURE — 1160F RVW MEDS BY RX/DR IN RCRD: CPT | Performed by: INTERNAL MEDICINE

## 2021-03-04 PROCEDURE — 1125F AMNT PAIN NOTED PAIN PRSNT: CPT | Performed by: INTERNAL MEDICINE

## 2021-03-04 RX ORDER — ATORVASTATIN CALCIUM 10 MG/1
TABLET, FILM COATED ORAL
Qty: 45 TABLET | Refills: 1 | Status: SHIPPED | OUTPATIENT
Start: 2021-03-04 | End: 2021-04-26 | Stop reason: SDUPTHER

## 2021-03-04 NOTE — ASSESSMENT & PLAN NOTE
Under care of hemato oncologist     Remains on Xeloda 1 tablet daily  Randalyn Romberg going for CT scan in near future

## 2021-03-04 NOTE — ASSESSMENT & PLAN NOTE
Cholesterol    Eat low cholesterol diet    Continue taking your cholesterol medicine as advised    Call if any side effects    Lipid Profile and LFT prior to next visit or as advised  ( You should Get periodically to monitor liver side effects)    Know you LDL ( Bad Cholesterol ) , HDL ( Good Cholesterol ) and Triglyceride goal

## 2021-03-04 NOTE — PROGRESS NOTES
Assessment and Plan:     Problem List Items Addressed This Visit     None      Visit Diagnoses     Medicare annual wellness visit, subsequent    -  Primary        BMI Counseling: There is no height or weight on file to calculate BMI  The BMI is above normal  Nutrition recommendations include decreasing portion sizes, encouraging healthy choices of fruits and vegetables, decreasing fast food intake, consuming healthier snacks, limiting drinks that contain sugar, moderation in carbohydrate intake, increasing intake of lean protein, reducing intake of saturated and trans fat and reducing intake of cholesterol  Exercise recommendations include exercising 3-5 times per week  Preventive health issues were discussed with patient, and age appropriate screening tests were ordered as noted in patient's After Visit Summary  Personalized health advice and appropriate referrals for health education or preventive services given if needed, as noted in patient's After Visit Summary       History of Present Illness:     Patient presents for Medicare Annual Wellness visit    Patient Care Team:  Willis Marie MD as PCP - General (Internal Medicine)  Willis Marie MD as PCP - [address] [place] [address] (RTE)     Problem List:     Patient Active Problem List   Diagnosis    Diarrhea    Hyperglycemia    Hypertension    Leukocytosis    [facility]ast[facility] colorectal cancer ([facility] [address] )    Hypercholesteremia    Bruise of both arms    Hemochromatosis    Vitamin D deficiency    Age related osteoporosis      Past Medical and Surgical History:     Past Medical History:   Diagnosis Date    Cancer, colon ([facility] [address] )     Heart murmur     High cholesterol     HLD (hyperlipidemia)     border line    Hx of radiation therapy     Portacath in place     right chest wall     Rectal cancer [facility])      Past Surgical History:   Procedure Laterality Date    COLONOSCOPY      LIVER BIOPSY      in 5/2017    PORTACATH PLACEMENT      in 2016      Family History:     Family History   Problem Relation Age of Onset    No Known Problems Mother     No Known Problems Father       Social History:        Social History     Socioeconomic History    Marital status: /Civil Union     Spouse name: Not on file    Number of children: Not on file    Years of education: Not on file    Highest education level: Not on file   Occupational History    Not on file   Social Needs    Financial resource strain: Not on file    Food insecurity     Worry: Not on file     Inability: Not on file   [employer] needs     Medical: Not on file     Non-medical: Not on file   Tobacco Use    Smoking status: Former Smoker     Packs/day: 1 00     Years: 10 00     Pack years: 10 00    Smokeless tobacco: Never Used    Tobacco comment: quit 20 years ago   Substance and Sexual Activity    Alcohol use: Yes     Comment: occasionally   Drug use: Never    Sexual activity: Not on file   Lifestyle    Physical activity     Days per week: Not on file     Minutes per session: Not on file    Stress: Not on file   Relationships    Social connections     Talks on phone: Not on file     Gets together: Not on file     Attends Sabianist service: Not on file     Active member of club or organization: Not on file     Attends meetings of clubs or organizations: Not on file     Relationship status: Not on file    Intimate partner violence     Fear of current or ex partner: Not on file     Emotionally abused: Not on file     Physically abused: Not on file     Forced sexual activity: Not on file   Other Topics Concern    Not on file   Social History Narrative    Lives with wife        Medications and Allergies:     Current Outpatient Medications   Medication Sig Dispense Refill    atorvastatin (LIPITOR) 10 mg tablet Take 1 tablet (10 mg total) by mouth daily 1/2 tablet daily , dose unknown not taken in awhile 45 tablet 1    capecitabine (XELODA) 500 MG tablet 2(500mg) in and 2(50mg) in the pm  Cholecalciferol (VITAMIN D3) 3000 units TABS Take by mouth      Cyanocobalamin (VITAMIN B 12 PO) Take by mouth      denosumab (PROLIA) 60 mg/mL Inject 60 mg under the skin      folic acid (FOLVITE) 1 mg tablet Take by mouth daily      Multiple Vitamins-Minerals (CENTRUM SILVER PO) Take by mouth      potassium chloride (K-DUR) 10 mEq tablet Take 10 mEq by mouth daily       No current facility-administered medications for this visit  No Known Allergies   Immunizations:     Immunization History   Administered Date(s) Administered    INFLUENZA 10/22/2017    Influenza, high dose seasonal 0 7 mL 10/20/2020    Influenza, injectable, quadrivalent, preservative free 0 5 mL 10/31/2019    SARS-CoV-2 / COVID-19 mRNA IM ([facility] Hunter) 01/21/2021, 02/19/2021      Health Maintenance: There are no preventive care reminders to display for this patient  Topic Date Due    DTaP,Tdap,and Td Vaccines (1 - Tdap) 10/12/1966    Pneumococcal Vaccine: 65+ Years (1 of 1 - PPSV23) 10/12/2008      Medicare Health Risk Assessment:     There were no vitals taken for this visit  Elvia Davis is here for his Subsequent Wellness visit  Last Medicare Wellness visit information reviewed, patient interviewed and updates made to the record today  Health Risk Assessment:   Patient rates overall health as very good  Patient feels that their physical health rating is slightly better  Eyesight was rated as same  Hearing was rated as same  Patient feels that their emotional and mental health rating is same  Pain experienced in the last 7 days has been none  Patient states that he has experienced no weight loss or gain in last 6 months  Weight stays the same  Has gained a bit because he lost some witrh chemo treatment  Depression Screening:   PHQ-2 Score: 0      Fall Risk Screening:    In the past year, patient has experienced: no history of falling in past year      Home Safety:  Patient does not have trouble with stairs inside or outside of their home  Patient has working smoke alarms and has working carbon monoxide detector  Home safety hazards include: not having non-slip bath and/or shower mats  No home hazards  Pt feels safe  Rec non slip mats  Pt is safe  Nutrition:   Current diet is Regular  Eats a balanced diet  Medications:   Patient is currently taking over-the-counter supplements  OTC medications include: see medication list  Patient is able to manage medications  Manages his own meds with no issues  Activities of Daily Living (ADLs)/Instrumental Activities of Daily Living (IADLs):   Walk and transfer into and out of bed and chair?: Yes  Dress and groom yourself?: Yes    Bathe or shower yourself?: Yes    Feed yourself? Yes  Do your laundry/housekeeping?: Yes  Manage your money, pay your bills and track your expenses?: Yes  Make your own meals?: Yes    Do your own shopping?: Yes    ADL comments: Independent, outgoing and lives with wife  Previous Hospitalizations:   Any hospitalizations or ED visits within the last 12 months?: No      Hospitalization Comments: Chronic conditions have been stable  Advance Care Planning:   Living will: Yes    Durable POA for healthcare: Yes    Advanced directive: Yes    Advanced directive counseling given: Yes    Five wishes given: No    Patient declined ACP directive: No    End of Life Decisions reviewed with patient: Yes    Provider agrees with end of life decisions: Yes      Comments: Encoraged full discussion with family especially wife  Bring Healthcare POA doc to next visit      Cognitive Screening:   Provider or family/friend/caregiver concerned regarding cognition?: No    PREVENTIVE SCREENINGS      Cardiovascular Screening:    General: Screening Not Indicated and History Lipid Disorder      Colorectal Cancer Screening:     General: History Colorectal Cancer      Prostate Cancer Screening:    General: Screening Not Indicated      Osteoporosis Screening:    General: Screening Not Indicated and History Osteoporosis      Abdominal Aortic Aneurysm (AAA) Screening:    Risk factors include: tobacco use        General: Screening Not Indicated and Risks and Benefits Discussed      Lung Cancer Screening:     General: Screening Not Indicated and Risks and Benefits Discussed      Hepatitis C Screening:    General: Risks and Benefits Discussed and Patient Declines    Hep C Screening Accepted: No       Preventive Screening Comments: No risk for hep C  Rec prevnar vax  Will see eye doctor        Jessica Villegas MD

## 2021-03-04 NOTE — PATIENT INSTRUCTIONS
Medicare Preventive Visit Patient Instructions  Thank you for completing your Welcome to Medicare Visit or Medicare Annual Wellness Visit today  Your next wellness visit will be due in one year (3/4/2022)  The screening/preventive services that you may require over the next 5-10 years are detailed below  Some tests may not apply to you based off risk factors and/or age  Screening tests ordered at today's visit but not completed yet may show as past due  Also, please note that scanned in results may not display below  Preventive Screenings:  Service Recommendations Previous Testing/Comments   Colorectal Cancer Screening  · Colonoscopy    · Fecal Occult Blood Test (FOBT)/Fecal Immunochemical Test (FIT)  · Fecal DNA/Cologuard Test  · Flexible Sigmoidoscopy Age: 54-65 years old   Colonoscopy: every 10 years (May be performed more frequently if at higher risk)  OR  FOBT/FIT: every 1 year  OR  Cologuard: every 3 years  OR  Sigmoidoscopy: every 5 years  Screening may be recommended earlier than age 48 if at higher risk for colorectal cancer  Also, an individualized decision between you and your healthcare provider will decide whether screening between the ages of 74-80 would be appropriate  Colonoscopy: Not on file  FOBT/FIT: Not on file  Cologuard: Not on file  Sigmoidoscopy: Not on file         Prostate Cancer Screening Individualized decision between patient and health care provider in men between ages of 53-78   Medicare will cover every 12 months beginning on the day after your 50th birthday PSA: No results in last 5 years          Hepatitis C Screening Once for adults born between [facility]  More frequently in patients at high risk for Hepatitis C Hep C Antibody: Not on file       Diabetes Screening 1-2 times per year if you're at risk for diabetes or have pre-diabetes Fasting glucose: No results in last 5 years   A1C: 6 0 %       Cholesterol Screening Once every 5 years if you don't have a lipid disorder  May order more often based on risk factors  Lipid panel: Not on file          Other Preventive Screenings Covered by Medicare:  1  Abdominal Aortic Aneurysm (AAA) Screening: covered once if your at risk  You're considered to be at risk if you have a family history of AAA or a male between the age of 73-68 who smoking at least 100 cigarettes in your lifetime  2  Lung Cancer Screening: covers low dose CT scan once per year if you meet all of the following conditions: (1) Age 50-69; (2) No signs or symptoms of lung cancer; (3) Current smoker or have quit smoking within the last 15 years; (4) You have a tobacco smoking history of at least 30 pack years (packs per day x number of years you smoked); (5) You get a written order from a healthcare provider  3  Glaucoma Screening: covered annually if you're considered high risk: (1) You have diabetes OR (2) Family history of glaucoma OR (3)  aged 48 and older OR (3)  American aged 72 and older  3  Osteoporosis Screening: covered every 2 years if you meet one of the following conditions: (1) Have a vertebral abnormality; (2) On glucocorticoid therapy for more than 3 months; (3) Have primary hyperparathyroidism; (4) On osteoporosis medications and need to assess response to drug therapy  5  HIV Screening: covered annually if you're between the age of 12-76  Also covered annually if you are younger than 13 and older than 72 with risk factors for HIV infection  For pregnant patients, it is covered up to 3 times per pregnancy      Immunizations:  Immunization Recommendations   Influenza Vaccine Annual influenza vaccination during flu season is recommended for all persons aged >= 6 months who do not have contraindications   Pneumococcal Vaccine (Prevnar and Pneumovax)  * Prevnar = PCV13  * Pneumovax = PPSV23 Adults 25-60 years old: 1-3 doses may be recommended based on certain risk factors  Adults 72 years old: Prevnar (PCV13) vaccine recommended followed by Pneumovax (PPSV23) vaccine  If already received PPSV23 since turning 65, then PCV13 recommended at least one year after PPSV23 dose  Hepatitis B Vaccine 3 dose series if at intermediate or high risk (ex: diabetes, end stage renal disease, liver disease)   Tetanus (Td) Vaccine - COST NOT COVERED BY MEDICARE PART B Following completion of primary series, a booster dose should be given every 10 years to maintain immunity against tetanus  Td may also be given as tetanus wound prophylaxis  Tdap Vaccine - COST NOT COVERED BY MEDICARE PART B Recommended at least once for all adults  For pregnant patients, recommended with each pregnancy  Shingles Vaccine (Shingrix) - COST NOT COVERED BY MEDICARE PART B  2 shot series recommended in those aged 48 and above     Health Maintenance Due:  There are no preventive care reminders to display for this patient  Immunizations Due:      Topic Date Due    DTaP,Tdap,and Td Vaccines (1 - Tdap) 10/12/1966    Pneumococcal Vaccine: 65+ Years (1 of 1 - PPSV23) 10/12/2008     Advance Directives   What are advance directives? Advance directives are legal documents that state your wishes and plans for medical care  These plans are made ahead of time in case you lose your ability to make decisions for yourself  Advance directives can apply to any medical decision, such as the treatments you want, and if you want to donate organs  What are the types of advance directives? There are many types of advance directives, and each state has rules about how to use them  You may choose a combination of any of the following:  · Living will: This is a written record of the treatment you want  You can also choose which treatments you do not want, which to limit, and which to stop at a certain time  This includes surgery, medicine, IV fluid, and tube feedings  · Durable power of  for healthcare [facility]):   This is a written record that states who you want to make healthcare choices for you when you are unable to make them for yourself  This person, called a proxy, is usually a family member or a friend  You may choose more than 1 proxy  · Do not resuscitate (DNR) order:  A DNR order is used in case your heart stops beating or you stop breathing  It is a request not to have certain forms of treatment, such as CPR  A DNR order may be included in other types of advance directives  · Medical directive: This covers the care that you want if you are in a coma, near death, or unable to make decisions for yourself  You can list the treatments you want for each condition  Treatment may include pain medicine, surgery, blood transfusions, dialysis, IV or tube feedings, and a ventilator (breathing machine)  · Values history: This document has questions about your views, beliefs, and how you feel and think about life  This information can help others choose the care that you would choose  Why are advance directives important? An advance directive helps you control your care  Although spoken wishes may be used, it is better to have your wishes written down  Spoken wishes can be misunderstood, or not followed  Treatments may be given even if you do not want them  An advance directive may make it easier for your family to make difficult choices about your care  Weight Management   Why it is important to manage your weight:  Being overweight increases your risk of health conditions such as heart disease, high blood pressure, type 2 diabetes, and certain types of cancer  It can also increase your risk for osteoarthritis, sleep apnea, and other respiratory problems  Aim for a slow, steady weight loss  Even a small amount of weight loss can lower your risk of health problems  How to lose weight safely:  A safe and healthy way to lose weight is to eat fewer calories and get regular exercise  You can lose up about 1 pound a week by decreasing the number of calories you eat by 500 calories each day     Healthy meal plan for weight management:  A healthy meal plan includes a variety of foods, contains fewer calories, and helps you stay healthy  A healthy meal plan includes the following:  · Eat whole-grain foods more often  A healthy meal plan should contain fiber  Fiber is the part of grains, fruits, and vegetables that is not broken down by your body  Whole-grain foods are healthy and provide extra fiber in your diet  Some examples of whole-grain foods are whole-wheat breads and pastas, oatmeal, brown rice, and bulgur  · Eat a variety of vegetables every day  Include dark, leafy greens such as spinach, kale, flakita greens, and mustard greens  Eat yellow and orange vegetables such as carrots, sweet potatoes, and winter squash  · Eat a variety of fruits every day  Choose fresh or canned fruit (canned in its own juice or light syrup) instead of juice  Fruit juice has very little or no fiber  · Eat low-fat dairy foods  Drink fat-free (skim) milk or 1% milk  Eat fat-free yogurt and low-fat cottage cheese  Try low-fat cheeses such as mozzarella and other reduced-fat cheeses  · Choose meat and other protein foods that are low in fat  Choose beans or other legumes such as split peas or lentils  Choose fish, skinless poultry (chicken or turkey), or lean cuts of red meat (beef or pork)  Before you cook meat or poultry, cut off any visible fat  · Use less fat and oil  Try baking foods instead of frying them  Add less fat, such as margarine, sour cream, regular salad dressing and mayonnaise to foods  Eat fewer high-fat foods  Some examples of high-fat foods include french fries, doughnuts, ice cream, and cakes  · Eat fewer sweets  Limit foods and drinks that are high in sugar  This includes candy, cookies, regular soda, and sweetened drinks  Exercise:  Exercise at least 30 minutes per day on most days of the week  Some examples of exercise include walking, biking, dancing, and swimming   You can also fit in more physical activity by taking the stairs instead of the elevator or parking farther away from stores  Ask your healthcare provider about the best exercise plan for you  © Copyright [employer] 2018 Information is for End User's use only and may not be sold, redistributed or otherwise used for commercial purposes  All illustrations and images included in CareNotes® are the copyrighted property of [employer]  or [address]    1  Please find out when last PSA was done from your oncologist and a send me the report and date  2  Go for blood test for CBCs CMP lipid profile and vitamin-D level within 3 weeks  Please call me call me 1 week after the blood test for the report  3  Of I will give name of of general surgeon for regarding how problem problem with port and possible consideration for the removal of as per the him as well as your oncologist     4  We will also share with you name of your colorectal surgeon that you have dealt before  Follow with Consultants as per their and our suggestion    Follow up in 12 week(s) or as needed earlier    Follow all instructions as advised and discussed  Take your medications as prescribed  Call the office immediately if you experience any side effects  Ask questions if you do not understand  Keep your scheduled appointment as advised or come sooner if necessary or in doubt  Best time to call for non-urgent matter or questions on weekdays is between 9am and 12 noon  See physician for any new symptoms or worsening of current symptoms  Urgent or emergent situations call 911 and report to nearest emergency room      I spent  30 -40 minutes taking care of this patient including clinical care, conseling, collaboration, chart, lab and consultaion review as appropriate    Patient is to get labs 3 week(s)

## 2021-03-04 NOTE — PROGRESS NOTES
Yohana Caputo Office Visit Note  21     Cori Perez 66 y o  male MRN: [id number]  : 1945    Assessment:     Hypercholesteremia  Cholesterol    Eat low cholesterol diet    Continue taking your cholesterol medicine as advised    Call if any side effects    Lipid Profile and LFT prior to next visit or as advised  ( You should Get periodically to monitor liver side effects)    Know you LDL ( Bad Cholesterol ) , HDL ( Good Cholesterol ) and Triglyceride goal      Vitamin D deficiency  Currently on vitamin-D 323 1000 unit daily  Hypertension  He is no longer n blood pressure medicine at  Will continue to monitor trend    Metastatic colorectal cancer [facility])  Under care of hemato oncologist     Remains on Xeloda 1 tablet daily  Gretchen Lotshoshana going for CT scan in near future  Age related osteoporosis  Remains on Prolia every 6 month  Periodic DEXA scan to be done    Interface, Rad Results In - 2020  2:16 PM EST  A DXA bone mineral density examination was performed with a Hologic scanner  The  patient is a 49-year-old postmenopausal female with a history of taking Prolia  The lumbar spine was examined from L1-4  In total, the bone mineral density is  0 1 standard deviations above the predicted peak bone mass and falls within a  normal range  The lumbar spine measurement is 1 063 g/cm2  The left hip was examined in several regions  In total, the bone mineral density  is 0 5 standard deviations below the predicted peak bone mass and is normal  The  total hip measurement is 0 880 g/cm2  The femoral neck measurement is 1 6 standard deviations below the predicted peak  bone mass and is 78% of normal   The femoral neck measurement is 0 666 g/cm2  IMPRESSION:  Impression:  The examination is reviewed using the World Health Organization criteria  There is osteopenia as measured by the bone mineral density of the femoral neck    1  The lumbar spine has bone mineral density that is in a normal range  2  The hip has bone mineral density of osteopenia with measurements that show  increased risk for fracture  3  A FRAX is not reported because the patient is being treated for osteoporosis  Hemochromatosis  He says work hand being monitored by hemato oncologist   No symptoms related to hemochromatosis       Diagnoses and all orders for this visit:    Medicare annual wellness visit, subsequent    Hypercholesteremia  -     atorvastatin (LIPITOR) 10 mg tablet; 1/2 tablet daily  -     Comprehensive metabolic panel; Future  -     Lipid panel; Future    Vitamin D deficiency  -     Vitamin D 25 hydroxy; Future    Essential hypertension  -     Comprehensive metabolic panel; Future    Metastatic colorectal cancer [facility])  -     Ambulatory referral to General Surgery; Future  -     Ambulatory referral to Colorectal Surgery; Future  -     CBC and differential; Future    Age related osteoporosis, unspecified pathological fracture presence    Hemochromatosis, unspecified hemochromatosis type          Discussion Summary and Plan: Today's care plan and medications were reviewed with patient in detail and all their questions answered to their satisfaction  D deficiency due for vitamin-D level  Osteoporosis remains on Prolia a every 6 months  Last DEXA scan up-to-date on November were of 2020  Home remains on atorvastatin 5 mg every day  In summary blood pressure remains controlled without blood pressure medicine  Hyperlipidemia due for lipid profile  Vitamin vitamin-D deficiency remains on vitamin-D supplement  Due for lipid profile vitamin-D level  Of colorectal cancer being managed by oncologist the going for PET scan of of port seems to be bothering him to be seen by surgeon for possible removal   Some rectal symptoms are related to either narrowing and or some tone issue patient to be re-evaluated by on of colorectal surgeon home  He will get me the report of last PSA    Blood sugar was borderline 1 time but was stable later on will monitor  O    Chief Complaint   Patient presents with    Medicare Wellness Visit    Hyperlipidemia    Cancer    Follow-up     Osteoporosis, hemochromatosis,      Subjective:  Chronic disease management    PSA:  PSA was done by [facility] oncologist   Patient will find out the results and will let us know       echhymosis chronic: baseline    Colorectal cancer:  Patient remains under care of oncologist now getting blood test monthly hand  Medicine as outlined above  Patient does have at times difficulty passing bowel movement he thinks that there is a has some tone problem which probably is possible due to previous nature of surgery  We will give him name of the colorectal surgeon that he has dealt with  He also had discussed this with gastroenterologist who told him that there is some narrowing in the rectal area  Generally speaking is not a major issue but he will discuss with them as see if anything can be done  Also is having some discomfort on the shoulder area home from the port port has not being used for a while  Of we will give him name of the a general surgeon as he would like to have it taken out  He will check with his oncologist for their approval prior to the procedure  Hyperlipidemia:  Tolerating statin for how every day 5 mg due for lipid profile  Blood sugar will monitor last 1 was normal on 06/15/2020  He hemochromatosis per [facility] oncologist a blood being monitored by them  Periodically  Of he got home motor now vaccine for COVID-19 did tolerated it without side effect  The following portions of the patient's history were reviewed and updated as appropriate: allergies, current medications, past family history, past medical history, past social history, past surgical history and problem list     Review of Systems   Constitutional: Negative for chills, fatigue, fever and unexpected weight change     HENT: Negative for ear pain, postnasal drip, sinus pain and sore throat  Eyes: Negative for pain and redness  Respiratory: Negative for cough and shortness of breath  Cardiovascular: Negative for chest pain, palpitations and leg swelling  Gastrointestinal: Negative for abdominal pain, diarrhea and nausea  Non specific rectal symptoms persisits,    Endocrine: Negative for cold intolerance, polydipsia and polyuria  Genitourinary: Negative for dysuria, frequency and urgency  Musculoskeletal: Negative for arthralgias, gait problem and myalgias  Skin: Negative for rash and wound  Allergic/Immunologic: Negative  Neurological: Negative for dizziness and headaches  Hematological: Negative for adenopathy  Bruises/bleeds easily  Psychiatric/Behavioral: Negative  Negative for behavioral problems  Historical Information   Patient Active Problem List   Diagnosis    Diarrhea    Hyperglycemia    Hypertension    Leukocytosis    Metastatic colorectal cancer (HCC)    Hypercholesteremia    Bruise of both arms    Hemochromatosis    Vitamin D deficiency    Age related osteoporosis     Past Medical History:   Diagnosis Date    Cancer, colon ([address] )     Heart murmur     High cholesterol     HLD (hyperlipidemia)     border line    Hx of radiation therapy     Portacath in place     right chest wall     Rectal cancer [facility])      Past Surgical History:   Procedure Laterality Date    COLONOSCOPY      LIVER BIOPSY      in 5/2017    PORTACATH PLACEMENT      in 2016     Social History     Substance and Sexual Activity   Alcohol Use Yes    Comment: occasionally        Social History     Substance and Sexual Activity   Drug Use Never     Social History     Tobacco Use   Smoking Status Former Smoker    Packs/day: 1 00    Years: 10 00    Pack years: 10 00   Smokeless Tobacco Never Used   Tobacco Comment    quit 20 years ago     Family History   Problem Relation Age of Onset    No Known Problems Mother     No Known Problems Father      Health Maintenance Due   Topic    DTaP,Tdap,and Td Vaccines (1 - Tdap)    Pneumococcal Vaccine: 65+ Years (1 of 1 - PPSV23)    Medicare Annual Wellness Visit (AWV)       Meds/Allergies       Current Outpatient Medications:     atorvastatin (LIPITOR) 10 mg tablet, 1/2 tablet daily, Disp: 45 tablet, Rfl: 1    capecitabine (XELODA) 500 MG tablet, 2(500mg) in and 2(50mg) in the pm, Disp: , Rfl:     Cholecalciferol (VITAMIN D3) 3000 units TABS, Take by mouth, Disp: , Rfl:     Cyanocobalamin (VITAMIN B 12 PO), Take by mouth, Disp: , Rfl:     denosumab (PROLIA) 60 mg/mL, Inject 60 mg under the skin, Disp: , Rfl:     folic acid (FOLVITE) 1 mg tablet, Take by mouth daily, Disp: , Rfl:     Multiple Vitamins-Minerals (CENTRUM SILVER PO), Take by mouth, Disp: , Rfl:     potassium chloride (K-DUR) 10 mEq tablet, Take 10 mEq by mouth daily, Disp: , Rfl:       Objective:    Vitals:   Pulse 70   Temp (!) 96 7 °F (35 9 °C)   Ht 6' (1 829 m)   Wt 81 2 kg (179 lb)   SpO2 99%   BMI 24 28 kg/m²   Body mass index is 24 28 kg/m²  Vitals:    03/04/21 0902   Weight: 81 2 kg (179 lb)       Physical Exam  Vitals signs and nursing note reviewed  Constitutional:       Appearance: Normal appearance  He is well-developed  He is not ill-appearing or diaphoretic  HENT:      Head: Normocephalic and atraumatic  Eyes:      Conjunctiva/sclera: Conjunctivae normal    Neck:      Thyroid: No thyromegaly  Vascular: No carotid bruit or JVD  Cardiovascular:      Rate and Rhythm: Regular rhythm  Heart sounds: Normal heart sounds  Pulmonary:      Effort: No respiratory distress  Breath sounds: Normal breath sounds  No wheezing or rales  Abdominal:      General: Bowel sounds are normal  There is no distension  Palpations: There is no mass  Tenderness: There is no abdominal tenderness  There is no rebound  Musculoskeletal:      Right lower leg: No edema  Left lower leg: No edema  Lymphadenopathy:      Cervical: No cervical adenopathy  Skin:     General: Skin is warm  Findings: No rash  Comments: Small cellulitis present present around ulcer  Neurological:      General: No focal deficit present  Mental Status: Mental status is at baseline  Psychiatric:         Mood and Affect: Mood normal          Thought Content: Thought content normal          Judgment: Judgment normal          Lab Review   No visits with results within 2 Month(s) from this visit  Latest known visit with results is:   Transcribe Orders on 02/01/2018   Component Date Value Ref Range Status    Sodium 02/01/2018 139  136 - 145 mmol/L Final    Potassium 02/01/2018 4 7  3 5 - 5 3 mmol/L Final    Chloride 02/01/2018 107  100 - 108 mmol/L Final    CO2 02/01/2018 29  21 - 32 mmol/L Final    ANION GAP 02/01/2018 3* 4 - 13 mmol/L Final    BUN 02/01/2018 16  5 - 25 mg/dL Final    Creatinine 02/01/2018 0 66  0 60 - 1 30 mg/dL Final    Standardized to IDMS reference method    Glucose 02/01/2018 92  65 - 140 mg/dL Final      If the patient is fasting, the ADA then defines impaired fasting glucose as > 100 mg/dL and diabetes as > or equal to 123 mg/dL  Specimen collection should occur prior to Sulfasalazine administration due to the potential for falsely depressed results  Specimen collection should occur prior to Sulfapyridine administration due to the potential for falsely elevated results   Calcium 02/01/2018 8 6  8 3 - 10 1 mg/dL Final    AST 02/01/2018 22  5 - 45 U/L Final      Specimen collection should occur prior to Sulfasalazine administration due to the potential for falsely depressed results   ALT 02/01/2018 22  12 - 78 U/L Final      Specimen collection should occur prior to Sulfasalazine and/or Sulfapyridine administration due to the potential for falsely depressed results       Alkaline Phosphatase 02/01/2018 81  46 - 116 U/L Final    Total Protein 02/01/2018 6 5  6 4 - 8 2 g/dL Final    Albumin 02/01/2018 3 0* 3 5 - 5 0 g/dL Final    Total Bilirubin 02/01/2018 0 41  0 20 - 1 00 mg/dL Final    eGFR 02/01/2018 95  ml/min/1 73sq m Final    WBC 02/01/2018 5 99  4 31 - 10 16 Thousand/uL Final    RBC 02/01/2018 3 20* 3 88 - 5 62 Million/uL Final    Hemoglobin 02/01/2018 11 2* 12 0 - 17 0 g/dL Final    Hematocrit 02/01/2018 33 3* 36 5 - 49 3 % Final    MCV 02/01/2018 104* 82 - 98 fL Final    MCH 02/01/2018 35 0* 26 8 - 34 3 pg Final    MCHC 02/01/2018 33 6  31 4 - 37 4 g/dL Final    RDW 02/01/2018 17 9* 11 6 - 15 1 % Final    Platelets 44/03/2217 365  149 - 390 Thousands/uL Final    MPV 02/01/2018 10 0  8 9 - 12 7 fL Final         Patient Instructions       Medicare Preventive Visit Patient Instructions  Thank you for completing your Welcome to Medicare Visit or Medicare Annual Wellness Visit today  Your next wellness visit will be due in one year (3/4/2022)  The screening/preventive services that you may require over the next 5-10 years are detailed below  Some tests may not apply to you based off risk factors and/or age  Screening tests ordered at today's visit but not completed yet may show as past due  Also, please note that scanned in results may not display below  Preventive Screenings:  Service Recommendations Previous Testing/Comments   Colorectal Cancer Screening  · Colonoscopy    · Fecal Occult Blood Test (FOBT)/Fecal Immunochemical Test (FIT)  · Fecal DNA/Cologuard Test  · Flexible Sigmoidoscopy Age: 54-65 years old   Colonoscopy: every 10 years (May be performed more frequently if at higher risk)  OR  FOBT/FIT: every 1 year  OR  Cologuard: every 3 years  OR  Sigmoidoscopy: every 5 years  Screening may be recommended earlier than age 48 if at higher risk for colorectal cancer  Also, an individualized decision between you and your healthcare provider will decide whether screening between the ages of 74-80 would be appropriate   Colonoscopy: Not on file  FOBT/FIT: Not on file  Cologuard: Not on file  Sigmoidoscopy: Not on file         Prostate Cancer Screening Individualized decision between patient and health care provider in men between ages of 53-78   Medicare will cover every 12 months beginning on the day after your 50th birthday PSA: No results in last 5 years          Hepatitis C Screening Once for adults born between [facility]  More frequently in patients at high risk for Hepatitis C Hep C Antibody: Not on file       Diabetes Screening 1-2 times per year if you're at risk for diabetes or have pre-diabetes Fasting glucose: No results in last 5 years   A1C: 6 0 %       Cholesterol Screening Once every 5 years if you don't have a lipid disorder  May order more often based on risk factors  Lipid panel: Not on file          Other Preventive Screenings Covered by Medicare:  1  Abdominal Aortic Aneurysm (AAA) Screening: covered once if your at risk  You're considered to be at risk if you have a family history of AAA or a male between the age of 73-68 who smoking at least 100 cigarettes in your lifetime  2  Lung Cancer Screening: covers low dose CT scan once per year if you meet all of the following conditions: (1) Age 50-69; (2) No signs or symptoms of lung cancer; (3) Current smoker or have quit smoking within the last 15 years; (4) You have a tobacco smoking history of at least 30 pack years (packs per day x number of years you smoked); (5) You get a written order from a healthcare provider  3  Glaucoma Screening: covered annually if you're considered high risk: (1) You have diabetes OR (2) Family history of glaucoma OR (3)  aged 48 and older OR (3)  American aged 72 and older  3   Osteoporosis Screening: covered every 2 years if you meet one of the following conditions: (1) Have a vertebral abnormality; (2) On glucocorticoid therapy for more than 3 months; (3) Have primary hyperparathyroidism; (4) On osteoporosis medications and need to assess response to drug therapy  5  HIV Screening: covered annually if you're between the age of 12-76  Also covered annually if you are younger than 13 and older than 72 with risk factors for HIV infection  For pregnant patients, it is covered up to 3 times per pregnancy  Immunizations:  Immunization Recommendations   Influenza Vaccine Annual influenza vaccination during flu season is recommended for all persons aged >= 6 months who do not have contraindications   Pneumococcal Vaccine (Prevnar and Pneumovax)  * Prevnar = PCV13  * Pneumovax = PPSV23 Adults 25-60 years old: 1-3 doses may be recommended based on certain risk factors  Adults 72 years old: Prevnar (PCV13) vaccine recommended followed by Pneumovax (PPSV23) vaccine  If already received PPSV23 since turning 65, then PCV13 recommended at least one year after PPSV23 dose  Hepatitis B Vaccine 3 dose series if at intermediate or high risk (ex: diabetes, end stage renal disease, liver disease)   Tetanus (Td) Vaccine - COST NOT COVERED BY MEDICARE PART B Following completion of primary series, a booster dose should be given every 10 years to maintain immunity against tetanus  Td may also be given as tetanus wound prophylaxis  Tdap Vaccine - COST NOT COVERED BY MEDICARE PART B Recommended at least once for all adults  For pregnant patients, recommended with each pregnancy  Shingles Vaccine (Shingrix) - COST NOT COVERED BY MEDICARE PART B  2 shot series recommended in those aged 48 and above     Health Maintenance Due:  There are no preventive care reminders to display for this patient  Immunizations Due:      Topic Date Due    DTaP,Tdap,and Td Vaccines (1 - Tdap) 10/12/1964    Pneumococcal Vaccine: 65+ Years (1 of 1 - PPSV23) 10/12/2008     Advance Directives   What are advance directives? Advance directives are legal documents that state your wishes and plans for medical care   These plans are made ahead of time in case you lose your ability to make decisions for yourself  Advance directives can apply to any medical decision, such as the treatments you want, and if you want to donate organs  What are the types of advance directives? There are many types of advance directives, and each state has rules about how to use them  You may choose a combination of any of the following:  · Living will: This is a written record of the treatment you want  You can also choose which treatments you do not want, which to limit, and which to stop at a certain time  This includes surgery, medicine, IV fluid, and tube feedings  · Durable power of  for healthcare [facility]): This is a written record that states who you want to make healthcare choices for you when you are unable to make them for yourself  This person, called a proxy, is usually a family member or a friend  You may choose more than 1 proxy  · Do not resuscitate (DNR) order:  A DNR order is used in case your heart stops beating or you stop breathing  It is a request not to have certain forms of treatment, such as CPR  A DNR order may be included in other types of advance directives  · Medical directive: This covers the care that you want if you are in a coma, near death, or unable to make decisions for yourself  You can list the treatments you want for each condition  Treatment may include pain medicine, surgery, blood transfusions, dialysis, IV or tube feedings, and a ventilator (breathing machine)  · Values history: This document has questions about your views, beliefs, and how you feel and think about life  This information can help others choose the care that you would choose  Why are advance directives important? An advance directive helps you control your care  Although spoken wishes may be used, it is better to have your wishes written down  Spoken wishes can be misunderstood, or not followed  Treatments may be given even if you do not want them   An advance directive may make it easier for your family to make difficult choices about your care  Weight Management   Why it is important to manage your weight:  Being overweight increases your risk of health conditions such as heart disease, high blood pressure, type 2 diabetes, and certain types of cancer  It can also increase your risk for osteoarthritis, sleep apnea, and other respiratory problems  Aim for a slow, steady weight loss  Even a small amount of weight loss can lower your risk of health problems  How to lose weight safely:  A safe and healthy way to lose weight is to eat fewer calories and get regular exercise  You can lose up about 1 pound a week by decreasing the number of calories you eat by 500 calories each day  Healthy meal plan for weight management:  A healthy meal plan includes a variety of foods, contains fewer calories, and helps you stay healthy  A healthy meal plan includes the following:  · Eat whole-grain foods more often  A healthy meal plan should contain fiber  Fiber is the part of grains, fruits, and vegetables that is not broken down by your body  Whole-grain foods are healthy and provide extra fiber in your diet  Some examples of whole-grain foods are whole-wheat breads and pastas, oatmeal, brown rice, and bulgur  · Eat a variety of vegetables every day  Include dark, leafy greens such as spinach, kale, flakita greens, and mustard greens  Eat yellow and orange vegetables such as carrots, sweet potatoes, and winter squash  · Eat a variety of fruits every day  Choose fresh or canned fruit (canned in its own juice or light syrup) instead of juice  Fruit juice has very little or no fiber  · Eat low-fat dairy foods  Drink fat-free (skim) milk or 1% milk  Eat fat-free yogurt and low-fat cottage cheese  Try low-fat cheeses such as mozzarella and other reduced-fat cheeses  · Choose meat and other protein foods that are low in fat  Choose beans or other legumes such as split peas or lentils   Choose fish, skinless poultry (chicken or turkey), or lean cuts of red meat (beef or pork)  Before you cook meat or poultry, cut off any visible fat  · Use less fat and oil  Try baking foods instead of frying them  Add less fat, such as margarine, sour cream, regular salad dressing and mayonnaise to foods  Eat fewer high-fat foods  Some examples of high-fat foods include french fries, doughnuts, ice cream, and cakes  · Eat fewer sweets  Limit foods and drinks that are high in sugar  This includes candy, cookies, regular soda, and sweetened drinks  Exercise:  Exercise at least 30 minutes per day on most days of the week  Some examples of exercise include walking, biking, dancing, and swimming  You can also fit in more physical activity by taking the stairs instead of the elevator or parking farther away from stores  Ask your healthcare provider about the best exercise plan for you  © Copyright [employer] 2018 Information is for End User's use only and may not be sold, redistributed or otherwise used for commercial purposes  All illustrations and images included in CareNotes® are the copyrighted property of A [employer] M , [employer]  or [employer]    1  Please find out when last PSA was done from your oncologist and a send me the report and date  2  Go for blood test for CBCs CMP lipid profile and vitamin-D level within 3 weeks  Please call me call me 1 week after the blood test for the report  3  Of I will give name of of general surgeon for regarding how problem problem with port and possible consideration for the removal of as per the him as well as your oncologist     4  We will also share with you name of your colorectal surgeon that you have dealt before  Follow with Consultants as per their and our suggestion    Follow up in 12 week(s) or as needed earlier    Follow all instructions as advised and discussed  Take your medications as prescribed      Call the office immediately if you experience any side effects  Ask questions if you do not understand  Keep your scheduled appointment as advised or come sooner if necessary or in doubt  Best time to call for non-urgent matter or questions on weekdays is between 9am and 12 noon  See physician for any new symptoms or worsening of current symptoms  Urgent or emergent situations call 911 and report to nearest emergency room  I spent  30 -40 minutes taking care of this patient including clinical care, conseling, collaboration, chart, lab and consultaion review as appropriate    Patient is to get labs 3 week(s)                Dr Raphael Zaldivar MD  [facility]       "This note has been constructed using a voice recognition system  Therefore there may be syntax, spelling, and/or grammatical errors   Please call if you have any questions  "

## 2021-03-04 NOTE — ASSESSMENT & PLAN NOTE
Remains on Prolia every 6 month  Periodic DEXA scan to be done    Interface, Rad Results In - 11/09/2020  2:16 PM EST  A DXA bone mineral density examination was performed with a Hologic scanner  The  patient is a 78-year-old postmenopausal female with a history of taking Prolia  The lumbar spine was examined from L1-4  In total, the bone mineral density is  0 1 standard deviations above the predicted peak bone mass and falls within a  normal range  The lumbar spine measurement is 1 063 g/cm2  The left hip was examined in several regions  In total, the bone mineral density  is 0 5 standard deviations below the predicted peak bone mass and is normal  The  total hip measurement is 0 880 g/cm2  The femoral neck measurement is 1 6 standard deviations below the predicted peak  bone mass and is 78% of normal   The femoral neck measurement is 0 666 g/cm2  IMPRESSION:  Impression:  The examination is reviewed using the World Health Organization criteria  There is osteopenia as measured by the bone mineral density of the femoral neck  1  The lumbar spine has bone mineral density that is in a normal range  2  The hip has bone mineral density of osteopenia with measurements that show  increased risk for fracture  3  A FRAX is not reported because the patient is being treated for osteoporosis

## 2021-03-23 ENCOUNTER — TELEPHONE (OUTPATIENT)
Dept: GASTROENTEROLOGY | Facility: CLINIC | Age: 76
End: 2021-03-23

## 2021-03-23 NOTE — TELEPHONE ENCOUNTER
Returned pts call  KINGA  He was asking for a sooner appointment for his procedure  His oncologist wants him to have procedure done sooner  I lm that  has an opening on 4/7  If he calls back please get him rescheduled if that date works for him

## 2021-03-24 ENCOUNTER — TRANSCRIBE ORDERS (OUTPATIENT)
Dept: ADMINISTRATIVE | Facility: HOSPITAL | Age: 76
End: 2021-03-24

## 2021-03-24 DIAGNOSIS — C20 MALIGNANT NEOPLASM OF RECTUM (HCC): ICD-10-CM

## 2021-03-24 DIAGNOSIS — C22.9 MALIGNANT NEOPLASM OF LIVER, NOT SPECIFIED AS PRIMARY OR SECONDARY (HCC): Primary | ICD-10-CM

## 2021-03-26 ENCOUNTER — PREP FOR PROCEDURE (OUTPATIENT)
Dept: GASTROENTEROLOGY | Facility: CLINIC | Age: 76
End: 2021-03-26

## 2021-03-26 NOTE — TELEPHONE ENCOUNTER
Returned pts call  He was asking about covid  His order is in chart  He needs to go to care now on April 1st  I [facility] for him with this info  Told him to call back if he had further questions

## 2021-03-29 DIAGNOSIS — Z01.818 ENCOUNTER FOR PREADMISSION TESTING: ICD-10-CM

## 2021-03-29 PROCEDURE — U0005 INFEC AGEN DETEC AMPLI PROBE: HCPCS | Performed by: INTERNAL MEDICINE

## 2021-03-29 PROCEDURE — U0003 INFECTIOUS AGENT DETECTION BY NUCLEIC ACID (DNA OR RNA); SEVERE ACUTE RESPIRATORY SYNDROME CORONAVIRUS 2 (SARS-COV-2) (CORONAVIRUS DISEASE [COVID-19]), AMPLIFIED PROBE TECHNIQUE, MAKING USE OF HIGH THROUGHPUT TECHNOLOGIES AS DESCRIBED BY CMS-2020-01-R: HCPCS | Performed by: INTERNAL MEDICINE

## 2021-03-30 ENCOUNTER — HOSPITAL ENCOUNTER (OUTPATIENT)
Dept: RADIOLOGY | Facility: HOSPITAL | Age: 76
Discharge: HOME | End: 2021-03-30
Payer: COMMERCIAL

## 2021-03-30 DIAGNOSIS — C22.9 MALIGNANT NEOPLASM OF LIVER, NOT SPECIFIED AS PRIMARY OR SECONDARY (HCC): ICD-10-CM

## 2021-03-30 DIAGNOSIS — C20 MALIGNANT NEOPLASM OF RECTUM (HCC): ICD-10-CM

## 2021-03-30 LAB — SARS-COV-2 RNA RESP QL NAA+PROBE: NEGATIVE

## 2021-03-30 PROCEDURE — G1004 CDSM NDSC: HCPCS

## 2021-03-30 PROCEDURE — 71260 CT THORAX DX C+: CPT

## 2021-03-30 PROCEDURE — 74177 CT ABD & PELVIS W/CONTRAST: CPT

## 2021-03-30 RX ADMIN — IOHEXOL 100 ML: 350 INJECTION, SOLUTION INTRAVENOUS at 08:31

## 2021-04-02 ENCOUNTER — CONSULT (OUTPATIENT)
Dept: SURGERY | Facility: CLINIC | Age: 76
End: 2021-04-02
Payer: COMMERCIAL

## 2021-04-02 VITALS
BODY MASS INDEX: 24.71 KG/M2 | HEIGHT: 72 IN | WEIGHT: 182.4 LBS | HEART RATE: 73 BPM | SYSTOLIC BLOOD PRESSURE: 134 MMHG | DIASTOLIC BLOOD PRESSURE: 62 MMHG | TEMPERATURE: 96.5 F

## 2021-04-02 DIAGNOSIS — K43.0 INCISIONAL HERNIA WITH OBSTRUCTION BUT NO GANGRENE: ICD-10-CM

## 2021-04-02 DIAGNOSIS — I10 HYPERTENSION: ICD-10-CM

## 2021-04-02 DIAGNOSIS — K62.5 BLEEDING PER RECTUM: ICD-10-CM

## 2021-04-02 DIAGNOSIS — Z20.822 COVID-19 RULED OUT BY LABORATORY TESTING: ICD-10-CM

## 2021-04-02 DIAGNOSIS — K40.90 RIGHT INGUINAL HERNIA: ICD-10-CM

## 2021-04-02 DIAGNOSIS — C19 METASTATIC COLORECTAL CANCER (HCC): Primary | ICD-10-CM

## 2021-04-02 DIAGNOSIS — K59.09 OTHER CONSTIPATION: ICD-10-CM

## 2021-04-02 DIAGNOSIS — K40.90 LEFT INGUINAL HERNIA: ICD-10-CM

## 2021-04-02 PROCEDURE — U0005 INFEC AGEN DETEC AMPLI PROBE: HCPCS | Performed by: INTERNAL MEDICINE

## 2021-04-02 PROCEDURE — 99204 OFFICE O/P NEW MOD 45 MIN: CPT | Performed by: SPECIALIST

## 2021-04-02 PROCEDURE — U0003 INFECTIOUS AGENT DETECTION BY NUCLEIC ACID (DNA OR RNA); SEVERE ACUTE RESPIRATORY SYNDROME CORONAVIRUS 2 (SARS-COV-2) (CORONAVIRUS DISEASE [COVID-19]), AMPLIFIED PROBE TECHNIQUE, MAKING USE OF HIGH THROUGHPUT TECHNOLOGIES AS DESCRIBED BY CMS-2020-01-R: HCPCS | Performed by: INTERNAL MEDICINE

## 2021-04-02 RX ORDER — POLYETHYLENE GLYCOL 3350 17 G/17G
17 POWDER, FOR SOLUTION ORAL DAILY
Qty: 119 G | Refills: 0 | Status: SHIPPED | OUTPATIENT
Start: 2021-04-02 | End: 2022-01-20

## 2021-04-02 NOTE — ASSESSMENT & PLAN NOTE
Offered the surgery with mesh repair after assessment and status assessment for colorectal cancer with liver metastasis patient is undergoing colonoscopy     follow-up after colonoscopy and CEA level

## 2021-04-02 NOTE — PROGRESS NOTES
History and Physical Examination - General Surgery   [facility]  Ariane Mahoney 66 y o  male MRN: [id number]  Unit/Bed#:  Encounter: [id number] PCP: Adrianna Tariq MD    History of Present Illness   Chief Complaint:     I have left inguinal hernia and groin discomfort    HPI:  Ariane Mahoney is a 66 y o  male who presents to office with  History of rectal carcinoma which was treated with radiation and chemotherapy and the diverting colostomy which was reversed in 2017    Patient had multiple liver metastasis which were also treated with  Chemo     patient is being followed by Dr Bossman Cramer MD oncologist  Dr Luciana Blair MD at [facility]   the patient has developed a Will chronic worsening constipation and for which he has to strain and he has noticed some blood into his finger stall undergoing treatment with the Dr Fred Antoine and scheduled for colonoscopy in few days     and because of constipation and straining his hernia flared up causing acute pain on the left side it was severe on that day but then later it subsided     patient has these hernias since  2017 though it has become more symptomatic since recent history of constipation     denies any nausea vomiting or tenderness over the left groin   denies any fever and chills   patient is having chronic constipation/diarrhea and some blood per rectum off and on    Historical Information   The following portions of the patient's history were reviewed and updated as appropriate  Past Medical History:   Diagnosis Date    Cancer, colon ([address] )     Heart murmur     High cholesterol     HLD (hyperlipidemia)     border line    Hx of radiation therapy     Portacath in place     right chest wall     Rectal cancer [facility])      Past Surgical History:   Procedure Laterality Date    COLONOSCOPY      LIVER BIOPSY      in 5/2017    PORTACATH PLACEMENT      in 2016     Social History   Social History     Substance and Sexual Activity   Alcohol Use Yes    Comment: occasionally        Social History     Substance and Sexual Activity   Drug Use Never     Social History     Tobacco Use   Smoking Status Former Smoker    Packs/day: 1 00    Years: 10 00    Pack years: 10 00   Smokeless Tobacco Never Used   Tobacco Comment    quit 20 years ago     Family History:   Family History   Problem Relation Age of Onset    No Known Problems Mother     No Known Problems Father        Meds/Allergies   No Known Allergies    Current Outpatient Medications:     atorvastatin (LIPITOR) 10 mg tablet, 1/2 tablet daily, Disp: 45 tablet, Rfl: 1    capecitabine (XELODA) 500 MG tablet, 2(500mg) in and 2(50mg) in the pm, Disp: , Rfl:     Cholecalciferol (VITAMIN D3) 3000 units TABS, Take by mouth, Disp: , Rfl:     Cyanocobalamin (VITAMIN B 12 PO), Take by mouth, Disp: , Rfl:     denosumab (PROLIA) 60 mg/mL, Inject 60 mg under the skin, Disp: , Rfl:     folic acid (FOLVITE) 1 mg tablet, Take by mouth daily, Disp: , Rfl:     Multiple Vitamins-Minerals (CENTRUM SILVER PO), Take by mouth, Disp: , Rfl:     potassium chloride (K-DUR) 10 mEq tablet, Take 10 mEq by mouth daily, Disp: , Rfl:      REVIEW OF SYSTEMS  Constitutional:  Denies fever or chills   Eyes:  Denies change in visual acuity   HENT:  Denies nasal congestion or sore throat   Respiratory:  Denies cough or shortness of breath   Cardiovascular:  Denies chest pain or edema   GI:  Denies abdominal pain, nausea, vomiting, bloody stools  History of constipation alternating with diarrhea and some blood on the tissue paper on wiping   patient has the rectal incontinence since his procedure and ulcer near the rectal area    :  Denies dysuria, frequency, difficulty in micturition and nocturia  Musculoskeletal:  Denies back pain or joint pain   Neurologic:  Denies headache, focal weakness or sensory changes   Endocrine:  Denies polyuria or polydipsia   Lymphatic:  Denies swollen glands   Psychiatric:  Denies depression or anxiety     Objective   Current Vitals:   /62 (BP Location: Right arm, Patient Position: Sitting, Cuff Size: Large)   Pulse 73   Temp (!) 96 5 °F (35 8 °C) (Core)   Ht 6' (1 829 m)   Wt 82 7 kg (182 lb 6 4 oz)   BMI 24 74 kg/m²   Body mass index is 24 74 kg/m²  PHYSICAL EXAMS  General:  Patient is not in acute distress, laying in the bed comfortably, awake, alert responding to commands,   HEENT:  Both pupils normal-size atraumatic, normocephalic, nonicteric  Neck:  JVP not raised  Trachea central  Respiratory:  normal Breath sounds clear to auscultation, Port-A-Cath in place on the right side  Cardiovascular:  S1-S2 normal with murmur   GI:  Abdomen soft nontender  Liver and spleen normal size, no free fluid,    have a scar over the right side of abdominal and s/p colostomy closure with the small reducible hernia which is nontender     left the inguinal hernia which is large with partially reducible nontender no signs of inflammation      right inguinal hernia which is small in size asymptomatic reducible minimal cough impulse  Musculoskeletal:  No back pain  Integument:  No skin rashes or ulceration  Lymphatic:  No cervical lymphadenopathy  Neurologic:  Patient is awake alert, responding to command, well-oriented to time and place and person moving all extremities ambulating well    Visit Diagnosis:   Diagnoses and all orders for this visit:    Metastatic colorectal cancer ([address] )    Hypertension    Bleeding per rectum    Incisional hernia with obstruction but no gangrene    Left inguinal hernia    Right inguinal hernia    Other constipation       Plan of care was discussed with patient in detail    Pertinent labs reviewed  Pertinent images and available reads personally reviewed  No results found    Pertinent notes reviewed    Assessment/Plan   Assessment:   symptomatic left inguinal hernia   a small right inguinal hernia possible fat containing asymptomatic   a small incisional hernia status post colostomy closure   advanced metastatic rectal cancer treated with the chemo radiation    increasing constipation some bleeding per rectum constipation alternating with diarrhea  Plan:  The risks, benefits, alternatives,and probabilities of success were discussed in detail with no guarantee made as to outcome  All questions were answered to the patient's satisfaction  patient had CT scan of abdominal pelvis and chest for tumor staging will follow official report     CEA level   patient is scheduled for colonoscopy in few days by Dr Garland Stephen will follow colonoscopy report for any recurrence of rectal cancer     follow-up in 3 weeks after colonoscopy and CEA level for possible hernia surgery if cancer is in remission    Preoperative prep was explained to the patient  Will repeat the blood work CBC CMP EKG prior to surgery    Counseling / Coordination of Care  Expained patient family in detailed about coordination of care  A description of the counseling / coordination of care:  I performed an interim history, pertinent images and labs, performed a physical examination to arrive at the plan delineated above with associated thought processes  MD Maya Mathews    Office   Tel  [phone]  Fax   [phone]

## 2021-04-03 LAB — SARS-COV-2 RNA RESP QL NAA+PROBE: NEGATIVE

## 2021-04-06 ENCOUNTER — ANESTHESIA EVENT (OUTPATIENT)
Dept: GASTROENTEROLOGY | Facility: AMBULARY SURGERY CENTER | Age: 76
End: 2021-04-06

## 2021-04-06 NOTE — PRE-PROCEDURE INSTRUCTIONS
Pre-Surgery Instructions:   Medication Instructions    atorvastatin (LIPITOR) 10 mg tablet Patient was instructed by Physician and understands   capecitabine (XELODA) 500 MG tablet Patient was instructed by Physician and understands   Cholecalciferol (VITAMIN D3) 3000 units TABS Patient was instructed by Physician and understands   Cyanocobalamin (VITAMIN B 12 PO) Patient was instructed by Physician and understands   denosumab (PROLIA) 60 mg/mL Patient was instructed by Physician and understands   folic acid (FOLVITE) 1 mg tablet Patient was instructed by Physician and understands   Multiple Vitamins-Minerals (CENTRUM SILVER PO) Patient was instructed by Physician and understands   polyethylene glycol (MIRALAX) 17 g packet Patient was instructed by Physician and understands   potassium chloride (K-DUR) 10 mEq tablet Patient was instructed by Physician and understands

## 2021-04-07 ENCOUNTER — TELEPHONE (OUTPATIENT)
Dept: GASTROENTEROLOGY | Facility: CLINIC | Age: 76
End: 2021-04-07

## 2021-04-07 ENCOUNTER — ANESTHESIA (OUTPATIENT)
Dept: GASTROENTEROLOGY | Facility: AMBULARY SURGERY CENTER | Age: 76
End: 2021-04-07

## 2021-04-07 ENCOUNTER — HOSPITAL ENCOUNTER (OUTPATIENT)
Dept: GASTROENTEROLOGY | Facility: AMBULARY SURGERY CENTER | Age: 76
Setting detail: OUTPATIENT SURGERY
Discharge: HOME | End: 2021-04-07
Attending: INTERNAL MEDICINE | Admitting: INTERNAL MEDICINE
Payer: COMMERCIAL

## 2021-04-07 VITALS
HEART RATE: 61 BPM | SYSTOLIC BLOOD PRESSURE: 137 MMHG | DIASTOLIC BLOOD PRESSURE: 65 MMHG | WEIGHT: 180 LBS | RESPIRATION RATE: 18 BRPM | TEMPERATURE: 96.3 F | HEIGHT: 72 IN | BODY MASS INDEX: 24.38 KG/M2 | OXYGEN SATURATION: 100 %

## 2021-04-07 DIAGNOSIS — Z85.048 PERSONAL HX-RECTAL/ANAL MALIGNANCY: ICD-10-CM

## 2021-04-07 DIAGNOSIS — Z86.010 HISTORY OF COLON POLYPS: ICD-10-CM

## 2021-04-07 DIAGNOSIS — Z20.822 COVID-19 RULED OUT BY LABORATORY TESTING: Primary | ICD-10-CM

## 2021-04-07 PROCEDURE — 88305 TISSUE EXAM BY PATHOLOGIST: CPT | Performed by: PATHOLOGY

## 2021-04-07 PROCEDURE — 45380 COLONOSCOPY AND BIOPSY: CPT | Performed by: INTERNAL MEDICINE

## 2021-04-07 RX ORDER — PROPOFOL 10 MG/ML
INJECTION, EMULSION INTRAVENOUS AS NEEDED
Status: DISCONTINUED | OUTPATIENT
Start: 2021-04-07 | End: 2021-04-07

## 2021-04-07 RX ORDER — LIDOCAINE HYDROCHLORIDE 10 MG/ML
INJECTION, SOLUTION EPIDURAL; INFILTRATION; INTRACAUDAL; PERINEURAL AS NEEDED
Status: DISCONTINUED | OUTPATIENT
Start: 2021-04-07 | End: 2021-04-07

## 2021-04-07 RX ORDER — PROPOFOL 10 MG/ML
INJECTION, EMULSION INTRAVENOUS CONTINUOUS PRN
Status: DISCONTINUED | OUTPATIENT
Start: 2021-04-07 | End: 2021-04-07

## 2021-04-07 RX ORDER — SODIUM CHLORIDE, SODIUM LACTATE, POTASSIUM CHLORIDE, CALCIUM CHLORIDE 600; 310; 30; 20 MG/100ML; MG/100ML; MG/100ML; MG/100ML
125 INJECTION, SOLUTION INTRAVENOUS CONTINUOUS
Status: DISCONTINUED | OUTPATIENT
Start: 2021-04-07 | End: 2021-04-11 | Stop reason: HOSPADM

## 2021-04-07 RX ADMIN — PROPOFOL 20 MG: 10 INJECTION, EMULSION INTRAVENOUS at 09:40

## 2021-04-07 RX ADMIN — PROPOFOL 50 MG: 10 INJECTION, EMULSION INTRAVENOUS at 09:38

## 2021-04-07 RX ADMIN — PROPOFOL 100 MG: 10 INJECTION, EMULSION INTRAVENOUS at 09:32

## 2021-04-07 RX ADMIN — LIDOCAINE HYDROCHLORIDE 50 MG: 10 INJECTION, SOLUTION EPIDURAL; INFILTRATION; INTRACAUDAL; PERINEURAL at 09:32

## 2021-04-07 RX ADMIN — PROPOFOL 80 MCG/KG/MIN: 10 INJECTION, EMULSION INTRAVENOUS at 09:32

## 2021-04-07 RX ADMIN — SODIUM CHLORIDE, SODIUM LACTATE, POTASSIUM CHLORIDE, AND CALCIUM CHLORIDE 125 ML/HR: .6; .31; .03; .02 INJECTION, SOLUTION INTRAVENOUS at 09:15

## 2021-04-07 NOTE — ANESTHESIA PREPROCEDURE EVALUATION
Procedure:  COLONOSCOPY    Relevant Problems   CARDIO   (+) Hypercholesteremia   (+) Hypertension      GI/HEPATIC   (+) Bleeding per rectum   (+) Metastatic colorectal cancer (HCC)        Physical Exam    Airway    Mallampati score: II  TM Distance: >3 FB  Neck ROM: full     Dental   No notable dental hx     Cardiovascular  Rhythm: regular, Rate: normal, Cardiovascular exam normal    Pulmonary  Pulmonary exam normal Breath sounds clear to auscultation,     Other Findings        Anesthesia Plan  ASA Score- 2     Anesthesia Type- IV sedation with anesthesia with ASA Monitors  Additional Monitors:   Airway Plan:           Plan Factors-Exercise tolerance (METS): >4 METS  Chart reviewed  Existing labs reviewed  Patient summary reviewed  Patient is not a current smoker  Induction- intravenous  Postoperative Plan-     Informed Consent- Anesthetic plan and risks discussed with patient  I personally reviewed this patient with the CRNA  Discussed and agreed on the Anesthesia Plan with the CRNA  Annette Lockwood

## 2021-04-07 NOTE — INTERVAL H&P NOTE
H&P reviewed  After examining the patient I find no changes in the patients condition since the H&P had been written      Vitals:    04/07/21 0907   BP: 164/77   Pulse: 78   Resp: 18   Temp: (!) 96 3 °F (35 7 °C)   SpO2: 100%

## 2021-04-07 NOTE — H&P
History and Physical - [facility]  Joana Gonzalez 66 y o  male MRN: [id number]                  HPI: Joana Gonzalez is a 66y o  year old male who presents for history of rectosigmoid cancer  Status post surgery and chemoradiation  Recent discovery of inguinal hernia  Colonoscopy is being done for assessment of recurrent cancer  REVIEW OF SYSTEMS: Per the HPI, and otherwise unremarkable  Historical Information   Past Medical History:   Diagnosis Date    [address] [address]     rectal    [address] [address]     Heart murmur     High cholesterol     HLD (hyperlipidemia)     border line    Hx of radiation therapy     Portacath in place     right chest wall     Rectal cancer [facility])      Past Surgical History:   Procedure Laterality Date    COLONOSCOPY      COLOSTOMY TAKEDOWN/REVERSE ANTON  2018    6 months after    HARTMANS PROCEDURE  2018    LIVER BIOPSY      in 2017    PORTACATH PLACEMENT      in      Social History   Social History     Substance and Sexual Activity   Alcohol Use Yes    Comment: occasionally        Social History     Substance and Sexual Activity   Drug Use Never     Social History     Tobacco Use   Smoking Status Former Smoker    Packs/day: 1 00    Years: 10 00    Pack years: 10 00    Quit date:     Years since quittin 2   Smokeless Tobacco Never Used   Tobacco Comment    quit 20 years ago     Family History   Problem Relation Age of Onset    No Known Problems Mother     No Known Problems Father        Meds/Allergies       Current Outpatient Medications:     atorvastatin (LIPITOR) 10 mg tablet    capecitabine (XELODA) 500 MG tablet    Cholecalciferol (VITAMIN D3) 3000 units TABS    Cyanocobalamin (VITAMIN B 12 PO)    folic acid (FOLVITE) 1 mg tablet    Multiple Vitamins-Minerals (CENTRUM SILVER PO)    polyethylene glycol (MIRALAX) 17 g packet    denosumab (PROLIA) 60 mg/mL    potassium chloride (K-DUR) 10 mEq tablet    Current Facility-Administered Medications:     lactated ringers infusion, 125 mL/hr, Intravenous, Continuous, 125 mL/hr at 04/07/21 0915    Facility-Administered Medications Ordered in Other Encounters:     lidocaine (PF) (XYLOCAINE-MPF) 1 % injection, , , PRN, 50 mg at 04/07/21 0932    propofol (DIPRIVAN) 1000 mg in 100 mL infusion (premix), , Intravenous, Continuous PRN, 100 mcg/kg/min at 04/07/21 0944    propofol (DIPRIVAN) 200 MG/20ML bolus injection, , Intravenous, PRN, 20 mg at 04/07/21 0940    No Known Allergies    Objective     /77   Pulse 78   Temp (!) 96 3 °F (35 7 °C) (Tympanic)   Resp 18   Ht 6' (1 829 m)   Wt 81 6 kg (180 lb)   SpO2 100%   BMI 24 41 kg/m²       PHYSICAL EXAM    Gen: NAD  CV: RRR  CHEST: Clear  ABD: soft, NT/ND  EXT: no edema      ASSESSMENT/PLAN:  This is a 66y o  year old male here for colonoscopy, and he is stable and optimized for his procedure

## 2021-04-07 NOTE — DISCHARGE SUMMARY
Discharge Summary - Ruth Wise 66 y o  male MRN: [id number]    Unit/Bed#:  Encounter: [id number]    Admission Date:  04/07/2021    Admitting Diagnosis: History of colon polyps [Z86 010]  Personal hx-rectal/anal malignancy [Z85 048]    HPI:  Colonoscopy done with biopsies  Patient has history of colorectal cancer  Procedures Performed: No orders of the defined types were placed in this encounter  Summary of Hospital Course: Tolerated procedure well  Significant Findings, Care, Treatment and Services Provided:  AVMs noted in the rectum and also ascending colon  There is evidence of luminal narrowing in the rectosigmoid area probably from scarring of surgery and radiation  Similar finding at the hepatic flexure  Biopsies taken  Complications:  None    Discharge Diagnosis:  See above    Resolved Problems  Date Reviewed: 4/7/2021    None          Condition at Discharge: good         Discharge instructions/Information to patient and family:   See after visit summary for information provided to patient and family  Provisions for Follow-Up Care:  See after visit summary for information related to follow-up care and any pertinent home health orders        PCP: Mercy Bustillos MD    Disposition: Home

## 2021-04-08 ENCOUNTER — TELEPHONE (OUTPATIENT)
Dept: GASTROENTEROLOGY | Facility: CLINIC | Age: 76
End: 2021-04-08

## 2021-04-23 ENCOUNTER — OFFICE VISIT (OUTPATIENT)
Dept: SURGERY | Facility: CLINIC | Age: 76
End: 2021-04-23
Payer: COMMERCIAL

## 2021-04-23 VITALS
SYSTOLIC BLOOD PRESSURE: 138 MMHG | HEIGHT: 72 IN | TEMPERATURE: 97.4 F | HEART RATE: 68 BPM | WEIGHT: 180.8 LBS | BODY MASS INDEX: 24.49 KG/M2 | DIASTOLIC BLOOD PRESSURE: 62 MMHG

## 2021-04-23 DIAGNOSIS — K43.0 INCISIONAL HERNIA WITH OBSTRUCTION BUT NO GANGRENE: ICD-10-CM

## 2021-04-23 DIAGNOSIS — K40.90 LEFT INGUINAL HERNIA: Primary | ICD-10-CM

## 2021-04-23 DIAGNOSIS — C19 METASTATIC COLORECTAL CANCER (HCC): ICD-10-CM

## 2021-04-23 DIAGNOSIS — E83.119 HEMOCHROMATOSIS: ICD-10-CM

## 2021-04-23 DIAGNOSIS — Z48.89 ENCOUNTER FOR SURGICAL FOLLOW-UP CARE: ICD-10-CM

## 2021-04-23 PROCEDURE — 1036F TOBACCO NON-USER: CPT | Performed by: SPECIALIST

## 2021-04-23 PROCEDURE — 3078F DIAST BP <80 MM HG: CPT | Performed by: SPECIALIST

## 2021-04-23 PROCEDURE — 3075F SYST BP GE 130 - 139MM HG: CPT | Performed by: SPECIALIST

## 2021-04-23 PROCEDURE — 99214 OFFICE O/P EST MOD 30 MIN: CPT | Performed by: SPECIALIST

## 2021-04-23 PROCEDURE — 1160F RVW MEDS BY RX/DR IN RCRD: CPT | Performed by: SPECIALIST

## 2021-04-23 RX ORDER — CEFAZOLIN SODIUM 1 G/50ML
1000 SOLUTION INTRAVENOUS ONCE
Status: CANCELLED | OUTPATIENT
Start: 2021-05-04 | End: 2021-04-23

## 2021-04-23 RX ORDER — CHLORHEXIDINE GLUCONATE 4 G/100ML
SOLUTION TOPICAL DAILY PRN
Status: CANCELLED | OUTPATIENT
Start: 2021-05-04

## 2021-04-23 NOTE — PROGRESS NOTES
General Surgery Office Visit Follow up   [facility]  Patient: Brodie Miranda   : 1945 Sex: male MRN: [id number]   CSN: [id number] PCP: Blaise Mcneil MD    Assessment/ Plan:  Brodie Miranda is a 66 y o  male  day(s)  Follow-up after colonoscopy and the CT scan  For symptomatic left inguinal hernia  Left inguinal hernia [K40 90]    Plan   CT scan unremarkable colonoscopy was some narrowing but the patient is having good bowel movement with MiraLax   and patient wants to get his hernia fixed   scheduled for hernia surgery    SUBJECTIVE:    I am doing well my constipation has improved with the MiraLax infected I has some incontinence because of radiation I have lost the splinter control but hernia still bothers me whenever I am doing some work and why want to get it fixed   colonoscopy/ CT scan/ pathology report reviewed with patient  OBJECTIVE:  No fever no chills no rigors  Tolerating p o   Diet well  Normal bowel movement no constipation or diarrhea  Ambulating well   Vitals:   /62 (BP Location: Left arm, Patient Position: Sitting, Cuff Size: Standard)   Pulse 68   Temp (!) 97 4 °F (36 3 °C) (Core)   Ht 6' (1 829 m)   Wt 82 kg (180 lb 12 8 oz)   BMI 24 52 kg/m²     Active medications:    Current Outpatient Medications:     atorvastatin (LIPITOR) 10 mg tablet, 1/2 tablet daily, Disp: 45 tablet, Rfl: 1    capecitabine (XELODA) 500 MG tablet, 500 mg 2 (two) times a day On 2 weeks off 1 week , Disp: , Rfl:     Cholecalciferol (VITAMIN D3) 3000 units TABS, Take by mouth, Disp: , Rfl:     Cyanocobalamin (VITAMIN B 12 PO), Take by mouth, Disp: , Rfl:     denosumab (PROLIA) 60 mg/mL, Inject 60 mg under the skin every 6 (six) months , Disp: , Rfl:     folic acid (FOLVITE) 1 mg tablet, Take by mouth daily, Disp: , Rfl:     Multiple Vitamins-Minerals (CENTRUM SILVER PO), Take by mouth, Disp: , Rfl:     polyethylene glycol (MIRALAX) 17 g packet, Take 17 g by mouth daily for 7 days (Patient taking differently: Take 17 g by mouth as needed ), Disp: 119 g, Rfl: 0    potassium chloride (K-DUR) 10 mEq tablet, Take 10 mEq by mouth daily, Disp: , Rfl:     Physical Exam:   General Alert awake   Normocephalic atraumatic PERRLA   Lungs clear bilaterally  Cardiac normal S1 normal S2  Abdomen soft,non tender Bowel sounds present   left groin hernia and soft nontender incisional hernia at the colostomy  Closure site  Skin:  moist  Ext: No clubbing, cyanosis, edema      Visit Diagnosis:   Diagnoses and all orders for this visit:    Left inguinal hernia  -     PAT Covid Screening; Future  -     Case request operating room: REPAIR HERNIA INGUINAL; Standing  -     Case request operating room: [address] [place] [address]    Encounter for surgical follow-up care  -     Case request operating room: [address] [place] [address]; Standing  -     Case request operating room: REPAIR HERNIA INGUINAL    Incisional hernia with obstruction but no gangrene  -     PAT Covid Screening; Future  -     Case request operating room: REPAIR HERNIA INGUINAL; Standing  -     Case request operating room: REPAIR HERNIA INGUINAL    Hemochromatosis  -     PAT Covid Screening; Future  -     Case request operating room: REPAIR HERNIA INGUINAL; Standing  -     Case request operating room: REPAIR HERNIA INGUINAL    Metastatic colorectal cancer ([facility] [place] [address] )  -     PAT Covid Screening;  Future  -     Case request operating room: REPAIR HERNIA INGUINAL; Standing  -     Case request operating room: REPAIR HERNIA INGUINAL    Other orders  -     Diet NPO; Sips with meds; Standing  -     Apply Sequential Compression Device; Standing  -     Place sequential compression device; Standing  -     Electrocardiogram, 12-lead; Standing  -     chlorhexidine (HIBICLENS) 4 % topical liquid  -     ceFAZolin (ANCEF) IVPB (premix in dextrose) 1,000 mg 50 mL  -     metroNIDAZOLE (FLAGYL) IVPB (premix) 500 mg 100 mL       Plan of care was discussed with patient in detail    Pertinent labs reviewed  Most Recent Labs:   No visits with results within 2 Week(s) from this visit  Latest known visit with results is:   Hospital Outpatient Visit on 04/07/2021   Component Date Value Ref Range Status    Case Report 04/07/2021    Final                    Value:Surgical Pathology Report                         Case: [id number]                                   Authorizing Provider:  Christopher Driscoll MD      Collected:           04/07/2021 0945              Ordering Location:     [facility]   Received:            04/07/2021 [address] [place] [address]                                                                       Pathologist:           Tomi Ghotra MD                                                   Specimen:    Large Intestine, Hepatic Flexure, 1, Biopsy of stricture, hepatic flexure                  Final Diagnosis 04/07/2021    Final                    Value: This result contains rich text formatting which cannot be displayed here   Additional Information 04/07/2021    Final                    Value: This result contains rich text formatting which cannot be displayed here  Montserrat Vaughn Description 04/07/2021    Final                    Value: This result contains rich text formatting which cannot be displayed here  Pertinent images and available reads personally reviewed  Procedure: Ct Chest Abdomen Pelvis W Contrast    Result Date: 4/3/2021  Narrative: CT CHEST, ABDOMEN AND PELVIS WITH IV CONTRAST INDICATION:   C22 9: Malignant neoplasm of liver, not specified as primary or secondary C20: Malignant neoplasm of rectum  COMPARISON:  Compared PET scan of 2/21/2020 and outside MRI of 10/18/2019 TECHNIQUE: CT examination of the chest, abdomen and pelvis was performed  Axial, sagittal, and coronal 2D reformatted images were created from the source data and submitted for interpretation   Radiation dose length product (DLP) for this visit:  1355 29 mGy-cm   This examination, like all CT scans performed in the [facility], was performed utilizing techniques to minimize radiation dose exposure, including the use of iterative reconstruction and automated exposure control  IV Contrast:  100 mL of iohexol (OMNIPAQUE) Enteric Contrast: Enteric contrast was administered  FINDINGS: CHEST LUNGS:  Lungs are clear  There is no tracheal or endobronchial lesion  PLEURA:  Biapical pleural thickening with calcification along the left apex  Calcified pleural plaques predominantly on the left  HEART/GREAT VESSELS: Dilated ascending thoracic aorta measuring 4 cm  Unremarkable for patient's age  MEDIASTINUM AND ALEX:  9 mm right paratracheal lymph node is unchanged  CHEST WALL AND LOWER NECK:   Right chest port catheter in the cavoatrial region  ABDOMEN LIVER/BILIARY TREE:  Multiple irregularly-shaped peripherally enhancing lesions  Largest in the left hepatic lobe measuring 3 5 x 2 7 cm and 2 3 x 2 2 cm  In the right lobe measuring 1 7 x 1 5 cm  Portal venous images demonstrate residual central nonenhancement  Delayed images demonstrate no identifiable lesion with complete filling of a all lesions  GALLBLADDER:  No calcified gallstones  No pericholecystic inflammatory change  SPLEEN:  Unremarkable  PANCREAS:  Unremarkable  ADRENAL GLANDS:  Unremarkable  KIDNEYS/URETERS:  Unremarkable  No hydronephrosis  STOMACH AND BOWEL:  Unremarkable  APPENDIX:  No findings to suggest appendicitis  ABDOMINOPELVIC CAVITY:  No ascites  No pneumoperitoneum  No lymphadenopathy  VESSELS:  Unremarkable for patient's age  PELVIS REPRODUCTIVE ORGANS:  Unremarkable for patient's age  URINARY BLADDER:  Unremarkable  ABDOMINAL WALL/INGUINAL REGIONS:  Right para midline hernial defect with omental fat is unchanged  Bilateral small inguinal hernias  OSSEOUS STRUCTURES:  No acute fracture or destructive osseous lesion  Impression: 1    Multiple enhancing hepatic lesions appear mostly similar to prior MRI study  Repeat MRI as clinically indicated  2   Other findings as described are unchanged  Workstation performed: [id number]       Pertinent notes reviewed    Counseling / Coordination of Care  Total Office time /unit time spent today 15minutes  Greater than 50% of total time was spent with the patient and / or family counseling and / or coordination of care  A description of the counseling / coordination of care:  I performed an interim history, pertinent images and labs, performed a physical examination to arrive at the plan delineated above with associated thought processes  Chyna Harry MD MS FRCS FACS  [facility]  04/23/21 9:25 AM        Portions of the record may have been created with voice recognition software  Occasional wrong word or "sound a like" substitutions may have occurred due to the inherent limitations of voice recognition software  Read the chart carefully and recognize, using context, where substitutions have occurred

## 2021-04-23 NOTE — H&P (VIEW-ONLY)
General Surgery Office Visit Follow up   [facility]  Patient: Ole Calle   : 1945 Sex: male MRN: [id number]   CSN: [id number] PCP: Kenneth Kelly MD    Assessment/ Plan:  Ole Calle is a 66 y o  male  day(s)  Follow-up after colonoscopy and the CT scan  For symptomatic left inguinal hernia  Left inguinal hernia [K40 90]    Plan   CT scan unremarkable colonoscopy was some narrowing but the patient is having good bowel movement with MiraLax   and patient wants to get his hernia fixed   scheduled for hernia surgery    SUBJECTIVE:    I am doing well my constipation has improved with the MiraLax infected I has some incontinence because of radiation I have lost the splinter control but hernia still bothers me whenever I am doing some work and why want to get it fixed   colonoscopy/ CT scan/ pathology report reviewed with patient  OBJECTIVE:  No fever no chills no rigors  Tolerating p o   Diet well  Normal bowel movement no constipation or diarrhea  Ambulating well   Vitals:   /62 (BP Location: Left arm, Patient Position: Sitting, Cuff Size: Standard)   Pulse 68   Temp (!) 97 4 °F (36 3 °C) (Core)   Ht 6' (1 829 m)   Wt 82 kg (180 lb 12 8 oz)   BMI 24 52 kg/m²     Active medications:    Current Outpatient Medications:     atorvastatin (LIPITOR) 10 mg tablet, 1/2 tablet daily, Disp: 45 tablet, Rfl: 1    capecitabine (XELODA) 500 MG tablet, 500 mg 2 (two) times a day On 2 weeks off 1 week , Disp: , Rfl:     Cholecalciferol (VITAMIN D3) 3000 units TABS, Take by mouth, Disp: , Rfl:     Cyanocobalamin (VITAMIN B 12 PO), Take by mouth, Disp: , Rfl:     denosumab (PROLIA) 60 mg/mL, Inject 60 mg under the skin every 6 (six) months , Disp: , Rfl:     folic acid (FOLVITE) 1 mg tablet, Take by mouth daily, Disp: , Rfl:     Multiple Vitamins-Minerals (CENTRUM SILVER PO), Take by mouth, Disp: , Rfl:     polyethylene glycol (MIRALAX) 17 g packet, Take 17 g by mouth daily for 7 days (Patient taking differently: Take 17 g by mouth as needed ), Disp: 119 g, Rfl: 0    potassium chloride (K-DUR) 10 mEq tablet, Take 10 mEq by mouth daily, Disp: , Rfl:     Physical Exam:   General Alert awake   Normocephalic atraumatic PERRLA   Lungs clear bilaterally  Cardiac normal S1 normal S2  Abdomen soft,non tender Bowel sounds present   left groin hernia and soft nontender incisional hernia at the colostomy  Closure site  Skin:  moist  Ext: No clubbing, cyanosis, edema      Visit Diagnosis:   Diagnoses and all orders for this visit:    Left inguinal hernia  -     PAT Covid Screening; Future  -     Case request operating room: REPAIR HERNIA INGUINAL; Standing  -     Case request operating room: [address] [place] [address]    Encounter for surgical follow-up care  -     Case request operating room: [address] [place] [address]; Standing  -     Case request operating room: REPAIR HERNIA INGUINAL    Incisional hernia with obstruction but no gangrene  -     PAT Covid Screening; Future  -     Case request operating room: REPAIR HERNIA INGUINAL; Standing  -     Case request operating room: REPAIR HERNIA INGUINAL    Hemochromatosis  -     PAT Covid Screening; Future  -     Case request operating room: REPAIR HERNIA INGUINAL; Standing  -     Case request operating room: REPAIR HERNIA INGUINAL    Metastatic colorectal cancer ([facility] [address] )  -     PAT Covid Screening;  Future  -     Case request operating room: REPAIR HERNIA INGUINAL; Standing  -     Case request operating room: REPAIR HERNIA INGUINAL    Other orders  -     Diet NPO; Sips with meds; Standing  -     Apply Sequential Compression Device; Standing  -     Place sequential compression device; Standing  -     Electrocardiogram, 12-lead; Standing  -     chlorhexidine (HIBICLENS) 4 % topical liquid  -     ceFAZolin (ANCEF) IVPB (premix in dextrose) 1,000 mg 50 mL  -     metroNIDAZOLE (FLAGYL) IVPB (premix) 500 mg 100 mL       Plan of care was discussed with patient in detail    Pertinent labs reviewed  Most Recent Labs:   No visits with results within 2 Week(s) from this visit  Latest known visit with results is:   Hospital Outpatient Visit on 04/07/2021   Component Date Value Ref Range Status    Case Report 04/07/2021    Final                    Value:Surgical Pathology Report                         Case: [id number]                                   Authorizing Provider:  Lacy Noriega MD      Collected:           04/07/2021 0945              Ordering Location:     [facility]   Received:            04/07/2021 [address] [place] [address]                                                                       Pathologist:           Reyna Knutson MD                                                   Specimen:    Large Intestine, Hepatic Flexure, 1, Biopsy of stricture, hepatic flexure                  Final Diagnosis 04/07/2021    Final                    Value: This result contains rich text formatting which cannot be displayed here   Additional Information 04/07/2021    Final                    Value: This result contains rich text formatting which cannot be displayed here  Windy Neighbor Description 04/07/2021    Final                    Value: This result contains rich text formatting which cannot be displayed here  Pertinent images and available reads personally reviewed  Procedure: Ct Chest Abdomen Pelvis W Contrast    Result Date: 4/3/2021  Narrative: CT CHEST, ABDOMEN AND PELVIS WITH IV CONTRAST INDICATION:   C22 9: Malignant neoplasm of liver, not specified as primary or secondary C20: Malignant neoplasm of rectum  COMPARISON:  Compared PET scan of 2/21/2020 and outside MRI of 10/18/2019 TECHNIQUE: CT examination of the chest, abdomen and pelvis was performed  Axial, sagittal, and coronal 2D reformatted images were created from the source data and submitted for interpretation   Radiation dose length product (DLP) for this visit:  1355 29 mGy-cm   This examination, like all CT scans performed in the [facility], was performed utilizing techniques to minimize radiation dose exposure, including the use of iterative reconstruction and automated exposure control  IV Contrast:  100 mL of iohexol (OMNIPAQUE) Enteric Contrast: Enteric contrast was administered  FINDINGS: CHEST LUNGS:  Lungs are clear  There is no tracheal or endobronchial lesion  PLEURA:  Biapical pleural thickening with calcification along the left apex  Calcified pleural plaques predominantly on the left  HEART/GREAT VESSELS: Dilated ascending thoracic aorta measuring 4 cm  Unremarkable for patient's age  MEDIASTINUM AND ALEX:  9 mm right paratracheal lymph node is unchanged  CHEST WALL AND LOWER NECK:   Right chest port catheter in the cavoatrial region  ABDOMEN LIVER/BILIARY TREE:  Multiple irregularly-shaped peripherally enhancing lesions  Largest in the left hepatic lobe measuring 3 5 x 2 7 cm and 2 3 x 2 2 cm  In the right lobe measuring 1 7 x 1 5 cm  Portal venous images demonstrate residual central nonenhancement  Delayed images demonstrate no identifiable lesion with complete filling of a all lesions  GALLBLADDER:  No calcified gallstones  No pericholecystic inflammatory change  SPLEEN:  Unremarkable  PANCREAS:  Unremarkable  ADRENAL GLANDS:  Unremarkable  KIDNEYS/URETERS:  Unremarkable  No hydronephrosis  STOMACH AND BOWEL:  Unremarkable  APPENDIX:  No findings to suggest appendicitis  ABDOMINOPELVIC CAVITY:  No ascites  No pneumoperitoneum  No lymphadenopathy  VESSELS:  Unremarkable for patient's age  PELVIS REPRODUCTIVE ORGANS:  Unremarkable for patient's age  URINARY BLADDER:  Unremarkable  ABDOMINAL WALL/INGUINAL REGIONS:  Right para midline hernial defect with omental fat is unchanged  Bilateral small inguinal hernias  OSSEOUS STRUCTURES:  No acute fracture or destructive osseous lesion  Impression: 1    Multiple enhancing hepatic lesions appear mostly similar to prior MRI study  Repeat MRI as clinically indicated  2   Other findings as described are unchanged  Workstation performed: [id number]       Pertinent notes reviewed    Counseling / Coordination of Care  Total Office time /unit time spent today 15minutes  Greater than 50% of total time was spent with the patient and / or family counseling and / or coordination of care  A description of the counseling / coordination of care:  I performed an interim history, pertinent images and labs, performed a physical examination to arrive at the plan delineated above with associated thought processes  Mirna Lee MD MS FRCS FACS  [facility]  04/23/21 9:25 AM        Portions of the record may have been created with voice recognition software  Occasional wrong word or "sound a like" substitutions may have occurred due to the inherent limitations of voice recognition software  Read the chart carefully and recognize, using context, where substitutions have occurred

## 2021-04-26 ENCOUNTER — PREP FOR PROCEDURE (OUTPATIENT)
Dept: SURGERY | Facility: CLINIC | Age: 76
End: 2021-04-26

## 2021-04-26 ENCOUNTER — OFFICE VISIT (OUTPATIENT)
Dept: LAB | Facility: HOSPITAL | Age: 76
End: 2021-04-26
Attending: SPECIALIST
Payer: COMMERCIAL

## 2021-04-26 ENCOUNTER — TRANSCRIBE ORDERS (OUTPATIENT)
Dept: ADMINISTRATIVE | Facility: HOSPITAL | Age: 76
End: 2021-04-26

## 2021-04-26 DIAGNOSIS — Z01.818 ENCOUNTER FOR OTHER PREPROCEDURAL EXAMINATION: ICD-10-CM

## 2021-04-26 DIAGNOSIS — E78.00 HYPERCHOLESTEREMIA: ICD-10-CM

## 2021-04-26 DIAGNOSIS — Z01.818 ENCOUNTER FOR OTHER PREPROCEDURAL EXAMINATION: Primary | ICD-10-CM

## 2021-04-26 PROCEDURE — 93005 ELECTROCARDIOGRAM TRACING: CPT

## 2021-04-26 RX ORDER — ATORVASTATIN CALCIUM 10 MG/1
TABLET, FILM COATED ORAL
Qty: 45 TABLET | Refills: 1 | Status: SHIPPED | OUTPATIENT
Start: 2021-04-26 | End: 2021-09-14

## 2021-04-27 ENCOUNTER — TRANSCRIBE ORDERS (OUTPATIENT)
Dept: RADIOLOGY | Facility: HOSPITAL | Age: 76
End: 2021-04-27

## 2021-04-27 ENCOUNTER — PREP FOR PROCEDURE (OUTPATIENT)
Dept: INTERVENTIONAL RADIOLOGY/VASCULAR | Facility: CLINIC | Age: 76
End: 2021-04-27

## 2021-04-27 DIAGNOSIS — Z95.828 PORT-A-CATH IN PLACE: Primary | ICD-10-CM

## 2021-04-27 DIAGNOSIS — Z48.89 ENCOUNTER FOR SURGICAL FOLLOW-UP CARE: Primary | ICD-10-CM

## 2021-04-27 NOTE — PRE-PROCEDURE INSTRUCTIONS
My Surgical Experience    The following information was developed to assist you to prepare for your operation  What do I need to do before coming to the hospital?   Arrange for a responsible person to drive you to and from the hospital    Arrange care for your children at home  Children are not allowed in the recovery areas of the hospital   Plan to wear clothing that is easy to put on and take off  If you are having shoulder surgery, wear a shirt that buttons or zippers in the front  Bathing  o Shower the evening before and the morning of your surgery with an antibacterial soap  Please refer to the Pre Op Showering Instructions for Surgery Patients Sheet   o Remove nail polish and all body piercing jewelry  o Do not shave any body part for at least 24 hours before surgery-this includes face, arms, legs and upper body  Food  o Nothing to eat or drink after midnight the night before your surgery  This includes candy and chewing gum  o Exception: If your surgery is after 12:00pm (noon), you may have clear liquids such as 7-Up®, ginger ale, apple or cranberry juice, Jell-O®, water, or clear broth until 8:00 am  o Do not drink milk or juice with pulp on the morning before surgery  o Do not drink alcohol 24 hours before surgery  Medicine  o Follow instructions you received from your surgeon about which medicines you may take on the day of surgery  o If instructed to take medicine on the morning of surgery, take pills with just a small sip of water  Call your prescribing doctor for specific infroamtion on what to do if you take insulin    What should I bring to the hospital?    Bring:  Gerry Melchor or a walker, if you have them, for foot or knee surgery   A list of the daily medicines, vitamins, minerals, herbals and nutritional supplements you take   Include the dosages of medicines and the time you take them each day   Glasses, dentures or hearing aids   Minimal clothing; you will be wearing hospital sleepwear   Photo ID; required to verify your identity   If you have a Living Will or Power of , bring a copy of the documents   If you have an ostomy, bring an extra pouch and any supplies you use    Do not bring   Medicines or inhalers   Money, valuables or jewelry    What other information should I know about the day of surgery?  Notify your surgeons if you develop a cold, sore throat, cough, fever, rash or any other illness   Report to the Ambulatory Surgical/Same Day Surgery Unit   You will be instructed to stop at Registration only if you have not been pre-registered   Inform your  fi they do not stay that they will be asked by the staff to leave a phone number where they can be reached   Be available to be reached before surgery  In the event the operating room schedule changes, you may be asked to come in earlier or later than expected    *It is important to tell your doctor and others involved in your health care if you are taking or have been taking any non-prescription drugs, vitamins, minerals, herbals or other nutritional supplements  Any of these may interact with some food or medicines and cause a reaction      Pre-Surgery Instructions:   Medication Instructions    atorvastatin (LIPITOR) 10 mg tablet Instructed patient per Anesthesia Guidelines   capecitabine (XELODA) 500 MG tablet Instructed patient per Anesthesia Guidelines   Cholecalciferol (VITAMIN D3) 3000 units TABS Instructed patient per Anesthesia Guidelines   Cyanocobalamin (VITAMIN B 12 PO) Instructed patient per Anesthesia Guidelines   denosumab (PROLIA) 60 mg/mL Instructed patient per Anesthesia Guidelines   folic acid (FOLVITE) 1 mg tablet Instructed patient per Anesthesia Guidelines   Multiple Vitamins-Minerals (CENTRUM SILVER PO) Instructed patient per Anesthesia Guidelines   polyethylene glycol (MIRALAX) 17 g packet Instructed patient per Anesthesia Guidelines      potassium chloride (K-DUR) 10 mEq tablet Instructed patient per Anesthesia Guidelines

## 2021-04-28 DIAGNOSIS — K40.90 LEFT INGUINAL HERNIA: ICD-10-CM

## 2021-04-28 DIAGNOSIS — C19 METASTATIC COLORECTAL CANCER (HCC): ICD-10-CM

## 2021-04-28 DIAGNOSIS — K43.0 INCISIONAL HERNIA WITH OBSTRUCTION BUT NO GANGRENE: ICD-10-CM

## 2021-04-28 DIAGNOSIS — E83.119 HEMOCHROMATOSIS: ICD-10-CM

## 2021-04-28 PROCEDURE — U0005 INFEC AGEN DETEC AMPLI PROBE: HCPCS | Performed by: SPECIALIST

## 2021-04-28 PROCEDURE — U0003 INFECTIOUS AGENT DETECTION BY NUCLEIC ACID (DNA OR RNA); SEVERE ACUTE RESPIRATORY SYNDROME CORONAVIRUS 2 (SARS-COV-2) (CORONAVIRUS DISEASE [COVID-19]), AMPLIFIED PROBE TECHNIQUE, MAKING USE OF HIGH THROUGHPUT TECHNOLOGIES AS DESCRIBED BY CMS-2020-01-R: HCPCS | Performed by: SPECIALIST

## 2021-04-29 LAB
ATRIAL RATE: 59 BPM
P AXIS: 72 DEGREES
PR INTERVAL: 128 MS
QRS AXIS: 51 DEGREES
QRSD INTERVAL: 100 MS
QT INTERVAL: 394 MS
QTC INTERVAL: 390 MS
SARS-COV-2 RNA RESP QL NAA+PROBE: NEGATIVE
T WAVE AXIS: 62 DEGREES
VENTRICULAR RATE: 59 BPM

## 2021-04-29 PROCEDURE — 93010 ELECTROCARDIOGRAM REPORT: CPT | Performed by: INTERNAL MEDICINE

## 2021-05-02 ENCOUNTER — ANESTHESIA EVENT (OUTPATIENT)
Dept: PERIOP | Facility: HOSPITAL | Age: 76
End: 2021-05-02
Payer: COMMERCIAL

## 2021-05-03 ENCOUNTER — HOSPITAL ENCOUNTER (OUTPATIENT)
Dept: NON INVASIVE DIAGNOSTICS | Facility: HOSPITAL | Age: 76
Discharge: HOME | End: 2021-05-03
Admitting: STUDENT IN AN ORGANIZED HEALTH CARE EDUCATION/TRAINING PROGRAM
Payer: COMMERCIAL

## 2021-05-03 ENCOUNTER — TELEPHONE (OUTPATIENT)
Dept: RADIOLOGY | Facility: HOSPITAL | Age: 76
End: 2021-05-03

## 2021-05-03 DIAGNOSIS — C19 METASTATIC COLORECTAL CANCER (HCC): Primary | ICD-10-CM

## 2021-05-03 DIAGNOSIS — Z48.89 ENCOUNTER FOR SURGICAL FOLLOW-UP CARE: ICD-10-CM

## 2021-05-03 PROCEDURE — 36598 INJ W/FLUOR EVAL CV DEVICE: CPT | Performed by: RADIOLOGY

## 2021-05-03 PROCEDURE — 36598 INJ W/FLUOR EVAL CV DEVICE: CPT

## 2021-05-03 NOTE — NURSING NOTE
Spoke with patient via phone regarding port removal scheduled for 5/7  Pt to arrive at 1015 in [facility], NPO after midnight , may take meds with sips of water  Pt aware covid testing needs to be done within 6 days of procedure  Pt will need a  home  Pt verbalized understanding of all instructions, questions answered as offered

## 2021-05-03 NOTE — SEDATION DOCUMENTATION
Port check completed  Per Dr Niyah Lopez, port will need to be removed  This will need to be scheduled another day  Patient states understanding

## 2021-05-04 ENCOUNTER — ANESTHESIA (OUTPATIENT)
Dept: PERIOP | Facility: HOSPITAL | Age: 76
End: 2021-05-04
Payer: COMMERCIAL

## 2021-05-04 ENCOUNTER — HOSPITAL ENCOUNTER (OUTPATIENT)
Facility: HOSPITAL | Age: 76
Setting detail: OUTPATIENT SURGERY
Discharge: HOME | End: 2021-05-04
Attending: SPECIALIST | Admitting: SPECIALIST
Payer: COMMERCIAL

## 2021-05-04 VITALS
RESPIRATION RATE: 14 BRPM | HEART RATE: 63 BPM | SYSTOLIC BLOOD PRESSURE: 140 MMHG | TEMPERATURE: 97.3 F | DIASTOLIC BLOOD PRESSURE: 63 MMHG | BODY MASS INDEX: 23.94 KG/M2 | WEIGHT: 176.5 LBS | OXYGEN SATURATION: 98 %

## 2021-05-04 DIAGNOSIS — K40.90 LEFT INGUINAL HERNIA: ICD-10-CM

## 2021-05-04 DIAGNOSIS — K43.0 INCISIONAL HERNIA WITH OBSTRUCTION BUT NO GANGRENE: ICD-10-CM

## 2021-05-04 DIAGNOSIS — E83.119 HEMOCHROMATOSIS: ICD-10-CM

## 2021-05-04 DIAGNOSIS — Z48.89 ENCOUNTER FOR SURGICAL FOLLOW-UP CARE: ICD-10-CM

## 2021-05-04 DIAGNOSIS — C19 METASTATIC COLORECTAL CANCER (HCC): ICD-10-CM

## 2021-05-04 PROCEDURE — 88302 TISSUE EXAM BY PATHOLOGIST: CPT | Performed by: PATHOLOGY

## 2021-05-04 PROCEDURE — 49505 PRP I/HERN INIT REDUC >5 YR: CPT | Performed by: SPECIALIST

## 2021-05-04 PROCEDURE — C1781 MESH (IMPLANTABLE): HCPCS | Performed by: SPECIALIST

## 2021-05-04 DEVICE — BARD MESH PERFIX PLUG, MEDIUM
Type: IMPLANTABLE DEVICE | Site: INGUINAL | Status: FUNCTIONAL
Brand: BARD MESH PERFIX PLUG

## 2021-05-04 RX ORDER — CHLORHEXIDINE GLUCONATE 4 G/100ML
SOLUTION TOPICAL DAILY PRN
Status: DISCONTINUED | OUTPATIENT
Start: 2021-05-04 | End: 2021-05-04 | Stop reason: HOSPADM

## 2021-05-04 RX ORDER — ONDANSETRON 2 MG/ML
INJECTION INTRAMUSCULAR; INTRAVENOUS AS NEEDED
Status: DISCONTINUED | OUTPATIENT
Start: 2021-05-04 | End: 2021-05-04

## 2021-05-04 RX ORDER — BUPIVACAINE HYDROCHLORIDE AND EPINEPHRINE 5; 5 MG/ML; UG/ML
INJECTION, SOLUTION PERINEURAL AS NEEDED
Status: DISCONTINUED | OUTPATIENT
Start: 2021-05-04 | End: 2021-05-04 | Stop reason: HOSPADM

## 2021-05-04 RX ORDER — METOPROLOL TARTRATE 5 MG/5ML
2.5 INJECTION INTRAVENOUS
Status: COMPLETED | OUTPATIENT
Start: 2021-05-04 | End: 2021-05-04

## 2021-05-04 RX ORDER — FENTANYL CITRATE 50 UG/ML
INJECTION, SOLUTION INTRAMUSCULAR; INTRAVENOUS AS NEEDED
Status: DISCONTINUED | OUTPATIENT
Start: 2021-05-04 | End: 2021-05-04

## 2021-05-04 RX ORDER — FENTANYL CITRATE/PF 50 MCG/ML
25 SYRINGE (ML) INJECTION
Status: DISCONTINUED | OUTPATIENT
Start: 2021-05-04 | End: 2021-05-04 | Stop reason: HOSPADM

## 2021-05-04 RX ORDER — MAGNESIUM HYDROXIDE 1200 MG/15ML
LIQUID ORAL AS NEEDED
Status: DISCONTINUED | OUTPATIENT
Start: 2021-05-04 | End: 2021-05-04 | Stop reason: HOSPADM

## 2021-05-04 RX ORDER — PROPOFOL 10 MG/ML
INJECTION, EMULSION INTRAVENOUS AS NEEDED
Status: DISCONTINUED | OUTPATIENT
Start: 2021-05-04 | End: 2021-05-04

## 2021-05-04 RX ORDER — LABETALOL 20 MG/4 ML (5 MG/ML) INTRAVENOUS SYRINGE
10 ONCE
Status: COMPLETED | OUTPATIENT
Start: 2021-05-04 | End: 2021-05-04

## 2021-05-04 RX ORDER — SODIUM CHLORIDE, SODIUM LACTATE, POTASSIUM CHLORIDE, CALCIUM CHLORIDE 600; 310; 30; 20 MG/100ML; MG/100ML; MG/100ML; MG/100ML
50 INJECTION, SOLUTION INTRAVENOUS CONTINUOUS
Status: DISCONTINUED | OUTPATIENT
Start: 2021-05-04 | End: 2021-05-04 | Stop reason: HOSPADM

## 2021-05-04 RX ORDER — CEFAZOLIN SODIUM 1 G/50ML
1000 SOLUTION INTRAVENOUS ONCE
Status: DISCONTINUED | OUTPATIENT
Start: 2021-05-04 | End: 2021-05-04 | Stop reason: HOSPADM

## 2021-05-04 RX ORDER — SODIUM CHLORIDE, SODIUM LACTATE, POTASSIUM CHLORIDE, CALCIUM CHLORIDE 600; 310; 30; 20 MG/100ML; MG/100ML; MG/100ML; MG/100ML
125 INJECTION, SOLUTION INTRAVENOUS CONTINUOUS
Status: DISCONTINUED | OUTPATIENT
Start: 2021-05-04 | End: 2021-05-04 | Stop reason: ALTCHOICE

## 2021-05-04 RX ORDER — ONDANSETRON 2 MG/ML
4 INJECTION INTRAMUSCULAR; INTRAVENOUS ONCE AS NEEDED
Status: DISCONTINUED | OUTPATIENT
Start: 2021-05-04 | End: 2021-05-04 | Stop reason: HOSPADM

## 2021-05-04 RX ORDER — CEFAZOLIN SODIUM 1 G/50ML
SOLUTION INTRAVENOUS AS NEEDED
Status: DISCONTINUED | OUTPATIENT
Start: 2021-05-04 | End: 2021-05-04

## 2021-05-04 RX ORDER — DEXAMETHASONE SODIUM PHOSPHATE 4 MG/ML
INJECTION, SOLUTION INTRA-ARTICULAR; INTRALESIONAL; INTRAMUSCULAR; INTRAVENOUS; SOFT TISSUE AS NEEDED
Status: DISCONTINUED | OUTPATIENT
Start: 2021-05-04 | End: 2021-05-04

## 2021-05-04 RX ORDER — OXYCODONE HYDROCHLORIDE AND ACETAMINOPHEN 5; 325 MG/1; MG/1
1 TABLET ORAL EVERY 4 HOURS PRN
Qty: 12 TABLET | Refills: 0 | Status: SHIPPED | OUTPATIENT
Start: 2021-05-04 | End: 2021-05-20

## 2021-05-04 RX ADMIN — LIDOCAINE HYDROCHLORIDE 40 MG: 20 INJECTION, SOLUTION INTRAVENOUS at 13:37

## 2021-05-04 RX ADMIN — PROPOFOL 150 MG: 10 INJECTION, EMULSION INTRAVENOUS at 13:37

## 2021-05-04 RX ADMIN — FENTANYL CITRATE 50 MCG: 50 INJECTION, SOLUTION INTRAMUSCULAR; INTRAVENOUS at 13:35

## 2021-05-04 RX ADMIN — DEXAMETHASONE SODIUM PHOSPHATE 4 MG: 4 INJECTION, SOLUTION INTRA-ARTICULAR; INTRALESIONAL; INTRAMUSCULAR; INTRAVENOUS; SOFT TISSUE at 13:51

## 2021-05-04 RX ADMIN — METRONIDAZOLE 500 MG: 500 INJECTION, SOLUTION INTRAVENOUS at 13:36

## 2021-05-04 RX ADMIN — METOPROLOL TARTRATE 2.5 MG: 5 INJECTION INTRAVENOUS at 17:35

## 2021-05-04 RX ADMIN — FENTANYL CITRATE 50 MCG: 50 INJECTION, SOLUTION INTRAMUSCULAR; INTRAVENOUS at 13:59

## 2021-05-04 RX ADMIN — SODIUM CHLORIDE, SODIUM LACTATE, POTASSIUM CHLORIDE, AND CALCIUM CHLORIDE: .6; .31; .03; .02 INJECTION, SOLUTION INTRAVENOUS at 13:34

## 2021-05-04 RX ADMIN — ONDANSETRON 4 MG: 2 INJECTION INTRAMUSCULAR; INTRAVENOUS at 13:51

## 2021-05-04 RX ADMIN — METOPROLOL TARTRATE 2.5 MG: 5 INJECTION INTRAVENOUS at 17:23

## 2021-05-04 RX ADMIN — LABETALOL 20 MG/4 ML (5 MG/ML) INTRAVENOUS SYRINGE 10 MG: at 16:42

## 2021-05-04 RX ADMIN — SODIUM CHLORIDE, SODIUM LACTATE, POTASSIUM CHLORIDE, AND CALCIUM CHLORIDE: .6; .31; .03; .02 INJECTION, SOLUTION INTRAVENOUS at 14:51

## 2021-05-04 RX ADMIN — CEFAZOLIN SODIUM 1000 MG: 1 SOLUTION INTRAVENOUS at 13:35

## 2021-05-04 NOTE — INTERVAL H&P NOTE
H&P reviewed  After examining the patient I find no changes in the patients condition since the H&P had been written      Vitals:    05/04/21 1047   BP: 128/61   Pulse: 62   Resp: 18   Temp: 98 1 °F (36 7 °C)   SpO2: 99%   Seen by Dr Natan Laurent along with surgical team  No new changes noted  Large left inguinal hernia  Antibiotics reviewed  Postop instructions discussed with patient

## 2021-05-04 NOTE — ANESTHESIA POSTPROCEDURE EVALUATION
Post-Op Assessment Note    CV Status:  Stable  Pain Score: 0    Pain management: adequate     Mental Status:  Sleepy   Hydration Status:  Stable and euvolemic   PONV Controlled:  None   Airway Patency:  Patent       Staff: CRNA         No complications documented      BP  140/78   Temp 36 0C   Pulse 64   Resp 20   SpO2 99%6lfm

## 2021-05-04 NOTE — ANESTHESIA PREPROCEDURE EVALUATION
Procedure:  REPAIR HERNIA INGUINAL (Left Abdomen)    Relevant Problems   CARDIO   (+) Hypercholesteremia   (+) Hypertension      GI/HEPATIC   (+) Bleeding per rectum   (+) Metastatic colorectal cancer (HCC)        Physical Exam    Airway    Mallampati score: II  TM Distance: >3 FB  Neck ROM: full     Dental   No notable dental hx     Cardiovascular  Cardiovascular exam normal    Pulmonary  Pulmonary exam normal     Other Findings        Anesthesia Plan  ASA Score- 3     Anesthesia Type- general with ASA Monitors  Additional Monitors:   Airway Plan: LMA  Plan Factors-Exercise tolerance (METS): >4 METS  Chart reviewed  EKG reviewed  Imaging results reviewed  Existing labs reviewed  Patient summary reviewed  Induction- intravenous  Postoperative Plan- Plan for postoperative opioid use  Informed Consent- Anesthetic plan and risks discussed with patient  I personally reviewed this patient with the CRNA  Discussed and agreed on the Anesthesia Plan with the CRNA  Antione Bosch

## 2021-05-04 NOTE — PERIOPERATIVE NURSING NOTE
Patient received from PACU in 3/10 pain  Dressings were clean, dry, and intact  Water given to patient  VSS  Will continue to monitor til discharge criteria is met

## 2021-05-04 NOTE — DISCHARGE INSTRUCTIONS
Please follow carefully all instructions checked below  [place]  Pre-op Diagnosis:   Encounter for surgical follow-up care [Z48 89]  Left inguinal hernia [K40 90]  Incisional hernia with obstruction but no gangrene [K43 0]  Hemochromatosis [E83 119]  Metastatic colorectal cancer ([facility] [address] ) [C19]  Post-op Diagnosis:  Post-Op Diagnosis Codes:     * Encounter for surgical follow-up care [Z48 89]     * Left inguinal hernia [K40 90]     * Incisional hernia with obstruction but no gangrene [K43 0]     * Hemochromatosis [E83 119]     * Metastatic colorectal cancer ([facility] ) [C19]  Procedure(s):  REPAIR HERNIA INGUINAL WITH MESH  Surgeon(s):  Susannah Mercado MD    1  Diet:  · Resume your normal diet  Avoid red meat eat lots of Plain natural yogurt or Probiotics   2  Medications:  · Home medications reviewed  Resume pre-operative medications unless noted below  · Prescription sent home with patient and Prescription sent over to pharmacy  · See after visit summary for reconciled discharge medications provided to patient and family  · Apply ice to incision area this will numb that area and it will prevent bruising and will minimize pain   3  Activities:  · Do NOT make important personal or business decisions for 24 hours  · Do NOT take the anticoagulation medicine like Eliquis Xarelto Coumadin for 24 hours after surgery  · Do NOT drive or operate machinery for 7 days  · While taking pain medication, do not drive and be careful as you walk or climb stairs  · Limit your activities for 3 weeks  Do not engage in sports, heavy work or heavy  lifting until your physician gives you permission  4  Wound Care:  · Change dressings as necessary after 2 days  · Keep dressings dry  5  Special Instructions:  · Full weight bearing  6  Bathing:  · May shower after 2 days  7  When to Call:  Call if fever, Nausea, vomiting, Constipation not feeling well, or wound infection, bleeding,reddness and excessive pain,  8   Follow-up Care:  · Make an appointment to see MD Alba Whitney FACS  in 10 days for Follow up  Please Call Office  [phone] for appointment,       Charlotte Leigh MD [id number] [facility]:  [address]  [place]  Office - [phone]  Fax - [phone]  01/03/18  2:24 PM    After An Inguinal Hernia Repair   AMBULATORY CARE:   Call 911 for any of the following:   · You feel lightheaded, short of breath, and have chest pain  · You cough up blood  · You have trouble breathing  Seek care immediately if:   · Your arm or leg feels warm, tender, and painful  It may look swollen and red  · Blood soaks through your bandage  · Your abdomen or groin feels hard and looks bigger than usual      · Your bruise suddenly gets bigger  · Your bowel movements are black, bloody, or tarry-looking  Contact your healthcare provider if:   · You have a fever above 101°F      · You develop a skin rash, hives, or itching  · Your incision is swollen, red, or draining pus or fluid  · You have nausea, or you are vomiting  · You cannot have a bowel movement  · You have trouble urinating  · Your pain does not get better after you take pain medicine  · You have questions or concerns about your condition or care  Medicines: You may need any of the following:  · Prescription pain medicine  may be given  Ask your healthcare provider how to take this medicine safely  Some prescription pain medicines contain acetaminophen  Do not take other medicines that contain acetaminophen without talking to your healthcare provider  Too much acetaminophen may cause liver damage  Prescription pain medicine may cause constipation  Ask your healthcare provider how to prevent or treat constipation  · NSAIDs , such as ibuprofen, help decrease swelling, pain, and fever  This medicine is available with or without a doctor's order  NSAIDs can cause stomach bleeding or kidney problems in certain people  If you take blood thinner medicine, always ask your healthcare provider if NSAIDs are safe for you  Always read the medicine label and follow directions  · Acetaminophen  decreases pain and fever  It is available without a doctor's order  Ask how much to take and how often to take it  Follow directions  Read the labels of all other medicines you are using to see if they also contain acetaminophen, or ask your doctor or pharmacist  Acetaminophen can cause liver damage if not taken correctly  Do not use more than 4 grams (4,000 milligrams) total of acetaminophen in one day  · Take your medicine as directed  Contact your healthcare provider if you think your medicine is not helping or if you have side effects  Tell him or her if you are allergic to any medicine  Keep a list of the medicines, vitamins, and herbs you take  Include the amounts, and when and why you take them  Bring the list or the pill bottles to follow-up visits  Carry your medicine list with you in case of an emergency  Bathing: You can shower in 48 hours  Remove your bandage before you shower  It is normal to see a small amount of blood under the bandage  Carefully wash around your wound  It is okay to let soap and water run over your wound  Do not  scrub your wound  Gently pay your wound dry  Care for your wound as directed: If you have strips of medical tape over your incision, allow them to fall off on their own  It may take 7 to 10 days for them to fall off  Do not put powders, lotions, or creams on your wound  They may cause your wound to get infected  Do not get in a bathtub, swimming pool, or hot tub until your healthcare provider says it is okay  Check your wound every day for signs of infection, such as redness, swelling, or pus  Bruising is normal and expected  Men may have bruising and swelling in the scrotum     Take deep breaths and cough:  Do this 10 times each hour  This will decrease your risk for a lung infection  Take a deep breath and hold it for as long as you can  Let the air out and then cough strongly  Deep breaths help open your airway  You may be given an incentive spirometer to help you take deep breaths  Put the plastic piece in your mouth and take a slow, deep breath  Then let the air out and cough  Repeat these steps 10 times every hour  Use a pillow as a splint:  Press a pillow lightly against your incision when you cough, move, or get out of bed  This may decrease pain or discomfort  You may need another person to help you get in and out of bed, a chair, or off the toilet  Activity:  Get out of bed and walk the day after your surgery  This will help prevent blood clots, move your bowels after surgery, and increase healing  Start with short walks around the house  Gradually walk further each day  Do not play sports for 2 to 3 weeks  Do not lift anything heavier than 5 pounds until your healthcare provider says it is okay  This may put too much pressure on your incision and cause it to come apart  It may also increase your risk for another hernia  Drink liquids as directed:  Liquids may prevent constipation and straining during a bowel movement  This will help prevent pressure on your incision, and prevent another hernia  Ask how much liquid to drink each day and which liquids are best for you  Decrease swelling:   · Apply ice  on your incision for 15 to 20 minutes every hour or as directed  Use an ice pack, or put crushed ice in a plastic bag  Cover it with a towel  Ice helps prevent tissue damage and decreases swelling and pain  · Elevate your scrotum  on a towel  Lie in bed  Roll a small towel and place it under your scrotum  This will help decrease swelling and bruising  Driving:  Do not drive for at least 1 week after surgery  Do not drive if you are taking prescription pain medication   Do not drive until it is comfortable to wear a seatbelt across your abdomen  Ask your healthcare provider when it is safe for you to drive  Return to work or school: You may be able to return to work or school in 50 to 67 hours  You may need to stay out of work if longer you have to lift heavy items at work  Do not smoke:  Nicotine and other chemicals in cigarettes and cigars can prevent your wound from healing  It can also increase your risk for another inguinal hernia  Ask your healthcare provider for information if you currently smoke and need help to quit  E-cigarettes or smokeless tobacco still contain nicotine  Talk to your healthcare provider before you use these products  Follow up with your healthcare provider as directed:  Write down your questions so you remember to ask them during your visits  © Copyright [address] Information is for End User's use only and may not be sold, redistributed or otherwise used for commercial purposes  All illustrations and images included in CareNotes® are the copyrighted property of [employer]  or [address] [place], [place] [address]bing   The above information is an  only  It is not intended as medical advice for individual conditions or treatments  Talk to your doctor, nurse or pharmacist before following any medical regimen to see if it is safe and effective for you

## 2021-05-04 NOTE — PERIOPERATIVE NURSING NOTE
Patient blood pressure 181/76, talked to Dr Anita Solo give 10mg labatolol  Will continue to moniotr

## 2021-05-04 NOTE — OP NOTE
General SurgeryREPAIR HERNIA INGUINAL WITH MESH Op Note    Darshan Cooper  5/4/2021    Pre-op Diagnosis:   Encounter for surgical follow-up care [Z48 89]  Left inguinal hernia [K40 90]  Incisional hernia with obstruction but no gangrene [K43 0]  Hemochromatosis [E83 119]  Metastatic colorectal cancer ([facility] [address] ) [C19]    Patient Active Problem List   Diagnosis    Diarrhea    Hyperglycemia    Hypertension    Leukocytosis    Metastatic colorectal cancer ([facility] [address] )    Hypercholesteremia    Bruise of both arms    Hemochromatosis    Vitamin D deficiency    Age related osteoporosis    Bleeding per rectum    Incisional hernia with obstruction but no gangrene    Left inguinal hernia    Right inguinal hernia    Other constipation    Encounter for surgical follow-up care       Post-op Diagnosis:  * No post-op diagnosis entered *     Post-Op Diagnosis Codes:     * Encounter for surgical follow-up care [Z48 89]     * Left inguinal hernia [K40 90]     * Incisional hernia with obstruction but no gangrene [K43 0]     * Hemochromatosis [E83 119]     * Metastatic colorectal cancer ([facility] [address] ) Hussein Shape    PMH:  Past Medical History:   Diagnosis Date    Cancer ([facility] [address] )     rectal    Cancer, colon ([address] )     Heart murmur     High cholesterol     HLD (hyperlipidemia)     border line    Hx of radiation therapy     Portacath in place     right chest wall     Rectal cancer ([facility] [address] )        Procedure(s):  REPAIR HERNIA INGUINAL WITH MESH    Surgeon(s):  Natan Laurent MD    Anesthesia:  General    Assistant:  Dr Zak Che MD  Surgical assistant 2nd year surgical residency program  [address] [facility]    Staff:   Circulator: Claudette Polanco RN  Scrub Person: Dora Gregorio RN; [facility]    Operative Findings:  Large lipoma of the cord  Large indirect inguinal hernia  Scarring/indurated tissue from previous radiation    [address]  was identified by me   Proper detailed consent was obtained The risks, benefits, alternatives,and probabilities of success were discussed in detail with no guarantee made as to outcome  All questions were answered to the patient's satisfaction  Site was marked prior coming to [facility] was given pre-operative antibiotics  Body mass index is 23 94 kg/m²  King Duenas is ASA 3 and Fire risk- 3  King Duenas was re-identified in the OR  Site was identified and detailed timeout was performed with Anesthesia and OR staff  Left groin was exposed  Operative site was cleansed and painted with ChloraPrep and draped in the usual sterile fashion  Incision was marked  Bony landmarks were identified  A small groin crease incision was made  The skin and subcutaneous tissue was cut along the line of incision  Proper hemostasis was achieved with Bovie cautery and free tie for two veins  Landons was cut along the line of incision  External oblique was identified and superficial inguinal ring was identified  Hernia was noted  Cord structures were  to and where loop over Penrose drain safeguarding ilioinguinal nerve which was sacrificed   This was indirect  hernia hernia sac which was identified and was carefully dissected up to the deep inguinal ring and was open and closed with chromic catgut -0 and was pushed through the deep ring  Lipoma of the cord was identified which was clamped near the deep inguinal ring  lipoma was ligated and cut  Medium-size plug was used this was placed in the deep inguinal ring and was secured to the conjoint tendon with a U-stitch with Prolene 2-0  Posterior wall of the inguinal canal was we Medium size Mesh was placed over the posterior inguinal canal, which was secured near the pubic tubercle with a U-stitch and superiorly to the tendinous portion of the conjoint tendon and inferior to the reflected part of the inguinal ligament  The split end was made to encircle the deep inguinal ring    No active bleeding was noted  Area was thoroughly lavaged with copious amounts of fluid for irrigation mixed with antibiotics  Landons was then approximated with running continuous suture  Superficial inguinal ring was reformed and 2-0 PDS used for closure of the external oblique  Subcutaneous tissue and Landons was approximated with interrupted 3-0 chromic catgut and skin was approximated with subcuticular Monocryl  At the end of the procedure testicles are noted to be in good position  Incision was closed with subcuticular Monocryl 4-0 and reinforced with steristrep          Area was thoroughly infiltrated with approximately 30 mL of Marcaine with Epinephrine as an ilioinguinal nerve block and a field block  Mesh used was [employer] Perfix mesh and plug medium size  Lot #[id number]   Expiratory date NOVEMBER 28, 2025  Reference # [id number]    Maciej Cervantes tolerated the procedure well, remain stable during and after the procedure  At the end of the procedure No active bleeding was noted and all the instruments, needle and sponge counts were noted to be correct  Sterile dresing was applied and patient was transfered to recovery area in stable condition  Estimated Blood Loss:  Minimal    Specimens:  Order Name Source Comment Collection Info Order Time   TISSUE EXAM Hernia Sac, Left Inguinal  Collected By: Bharati Ely MD 5/4/2021  2:11 PM     Release to patient through Mychart   Immediate              Drains:  * No LDAs found *    Complications:  None    Total OR Time  * Missing case tracking time(s) *   or    I was present for the entire procedure surgical resident was required due to the intensity of the surgery  Surgical resident was required for hemostasis retraction and the closure of the surgical wound  Surgical resident was present during the entire procedure        Patient Disposition:  PACU       Bharati Ely MD MS Carol Harman    Date: 5/4/2021  Time: 2:43 PM  Copy to PCP: Mariaa Camacho MD

## 2021-05-05 DIAGNOSIS — C19 METASTATIC COLORECTAL CANCER (HCC): ICD-10-CM

## 2021-05-05 PROCEDURE — U0003 INFECTIOUS AGENT DETECTION BY NUCLEIC ACID (DNA OR RNA); SEVERE ACUTE RESPIRATORY SYNDROME CORONAVIRUS 2 (SARS-COV-2) (CORONAVIRUS DISEASE [COVID-19]), AMPLIFIED PROBE TECHNIQUE, MAKING USE OF HIGH THROUGHPUT TECHNOLOGIES AS DESCRIBED BY CMS-2020-01-R: HCPCS | Performed by: INTERNAL MEDICINE

## 2021-05-05 PROCEDURE — U0005 INFEC AGEN DETEC AMPLI PROBE: HCPCS | Performed by: INTERNAL MEDICINE

## 2021-05-05 RX ORDER — SODIUM CHLORIDE 9 MG/ML
75 INJECTION, SOLUTION INTRAVENOUS CONTINUOUS
Status: CANCELLED | OUTPATIENT
Start: 2021-05-07

## 2021-05-06 ENCOUNTER — TELEPHONE (OUTPATIENT)
Dept: SURGERY | Facility: HOSPITAL | Age: 76
End: 2021-05-06

## 2021-05-06 LAB — SARS-COV-2 RNA RESP QL NAA+PROBE: NEGATIVE

## 2021-05-07 ENCOUNTER — HOSPITAL ENCOUNTER (OUTPATIENT)
Dept: NON INVASIVE DIAGNOSTICS | Facility: HOSPITAL | Age: 76
Discharge: HOME | End: 2021-05-07
Attending: RADIOLOGY | Admitting: RADIOLOGY
Payer: COMMERCIAL

## 2021-05-07 VITALS
WEIGHT: 180 LBS | HEIGHT: 72 IN | BODY MASS INDEX: 24.38 KG/M2 | DIASTOLIC BLOOD PRESSURE: 60 MMHG | TEMPERATURE: 97.9 F | SYSTOLIC BLOOD PRESSURE: 130 MMHG | HEART RATE: 56 BPM | RESPIRATION RATE: 16 BRPM | OXYGEN SATURATION: 100 %

## 2021-05-07 DIAGNOSIS — C19 METASTATIC COLORECTAL CANCER (HCC): ICD-10-CM

## 2021-05-07 PROCEDURE — 36590 REMOVAL TUNNELED CV CATH: CPT

## 2021-05-07 PROCEDURE — 36590 REMOVAL TUNNELED CV CATH: CPT | Performed by: RADIOLOGY

## 2021-05-07 PROCEDURE — 99152 MOD SED SAME PHYS/QHP 5/>YRS: CPT

## 2021-05-07 PROCEDURE — 99152 MOD SED SAME PHYS/QHP 5/>YRS: CPT | Performed by: RADIOLOGY

## 2021-05-07 RX ORDER — FENTANYL CITRATE 50 UG/ML
INJECTION, SOLUTION INTRAMUSCULAR; INTRAVENOUS CODE/TRAUMA/SEDATION MEDICATION
Status: COMPLETED | OUTPATIENT
Start: 2021-05-07 | End: 2021-05-07

## 2021-05-07 RX ORDER — LIDOCAINE WITH 8.4% SOD BICARB 0.9%(10ML)
SYRINGE (ML) INJECTION CODE/TRAUMA/SEDATION MEDICATION
Status: COMPLETED | OUTPATIENT
Start: 2021-05-07 | End: 2021-05-07

## 2021-05-07 RX ORDER — MIDAZOLAM HYDROCHLORIDE 2 MG/2ML
INJECTION, SOLUTION INTRAMUSCULAR; INTRAVENOUS CODE/TRAUMA/SEDATION MEDICATION
Status: COMPLETED | OUTPATIENT
Start: 2021-05-07 | End: 2021-05-07

## 2021-05-07 RX ADMIN — Medication 10 ML: at 10:00

## 2021-05-07 RX ADMIN — MIDAZOLAM 2 MG: 1 INJECTION INTRAMUSCULAR; INTRAVENOUS at 10:00

## 2021-05-07 RX ADMIN — FENTANYL CITRATE 100 MCG: 50 INJECTION, SOLUTION INTRAMUSCULAR; INTRAVENOUS at 10:00

## 2021-05-07 NOTE — SEDATION DOCUMENTATION
Port successfully removed  Patient tolerated well and AAOx3 talking to staff  Stable for transfer back to [facility]  Report and care assumed by primary RN

## 2021-05-07 NOTE — PERIOPERATIVE NURSING NOTE
Pt d/c to home at this time w/ wife Sandro Abdullahi  Via: Wheelchair  Pt left with all belongings  Iv was D/C intact with dry sterile dressing  Encouraged to keep follow up appointments, Verbalized understanding  D/C instructions reviewed and explained  Verbalized understanding

## 2021-05-07 NOTE — H&P
Interventional Radiology  History and Physical 2021     Dl Douglas   1945   [id number]    Assessment/Plan:  57-year-old male with right chest port found to be malfunctioning with leakage in the port catheter returns for port removal     Problem List Items Addressed This Visit        Digestive    Metastatic colorectal cancer ([address] )    Relevant Orders    IR PORT REMOVAL             Subjective:     Patient ID: Dl Douglas is a 66 y o  male  History of Present Illness  Patient with right chest port found to be malfunctioning with leakage in the port catheter returns for port removal     Review of Systems   Constitutional: Negative for chills and fever  Past Medical History:   Diagnosis Date    Cancer [facility])     rectal    Cancer, colon (HCC)     Heart murmur     High cholesterol     HLD (hyperlipidemia)     border line    Hx of radiation therapy     Portacath in place     right chest wall     Rectal cancer [facility])         Past Surgical History:   Procedure Laterality Date    COLONOSCOPY      COLOSTOMY TAKEDOWN/REVERSE ANTON  2018    6 months after    HARTMANS PROCEDURE  2018    LIVER BIOPSY      in 2017    PORTACATH PLACEMENT      in    [facility] HERNIA,5+Y/O,REDUCIBL Left 2021    Procedure: REPAIR HERNIA INGUINAL WITH MESH;  Surgeon: Mikaela Caal MD;  [facility];  Service: General        Social History     Tobacco Use   Smoking Status Former Smoker    Packs/day: 1 00    Years: 10 00    Pack years: 10 00    Quit date:     Years since quittin 3   Smokeless Tobacco Never Used   Tobacco Comment    quit 20 years ago        Social History     Substance and Sexual Activity   Alcohol Use Yes    Comment: occasionally           Social History     Substance and Sexual Activity   Drug Use Never        No Known Allergies    Current Outpatient Medications   Medication Sig Dispense Refill    atorvastatin (LIPITOR) 10 mg tablet 1/2 tablet daily 45 tablet 1    capecitabine (XELODA) 500 MG tablet 500 mg 2 (two) times a day On 2 weeks off 1 week  2 tabs bid      Cholecalciferol (VITAMIN D3) 3000 units TABS Take by mouth      Cyanocobalamin (VITAMIN B 12 PO) Take by mouth      denosumab (PROLIA) 60 mg/mL Inject 60 mg under the skin every 6 (six) months       folic acid (FOLVITE) 1 mg tablet Take by mouth daily      Multiple Vitamins-Minerals (CENTRUM SILVER PO) Take by mouth      oxyCODONE-acetaminophen (PERCOCET) 5-325 mg per tablet Take 1 tablet by mouth every 4 (four) hours as needed for moderate pain for up to 16 daysMax Daily Amount: 6 tablets 12 tablet 0    potassium chloride (K-DUR) 10 mEq tablet Take 10 mEq by mouth daily      polyethylene glycol (MIRALAX) 17 g packet Take 17 g by mouth daily for 7 days (Patient taking differently: Take 17 g by mouth as needed ) 119 g 0     No current facility-administered medications for this encounter  Objective:    Vitals:    05/07/21 0845   BP: 151/69   BP Location: Right arm   Pulse: 62   Resp: 16   Temp: 97 9 °F (36 6 °C)   SpO2: 99%   Weight: 81 6 kg (180 lb)   Height: 6' (1 829 m)        Physical Exam  Constitutional:       Appearance: Normal appearance  Pulmonary:      Effort: Pulmonary effort is normal    Musculoskeletal:      Comments: Right chest port in place           No results found for: BNP   Lab Results   Component Value Date    WBC 5 99 02/01/2018    HGB 11 2 (L) 02/01/2018    HCT 33 3 (L) 02/01/2018     (H) 02/01/2018     02/01/2018     No results found for: INR, PROTIME  No results found for: PTT      I have personally reviewed pertinent imaging and laboratory results  Code Status: Prior  Advance Directive and Living Will:      Power of :    POLST:      This text is generated with voice recognition software  There may be translation, syntax,  or grammatical errors  If you have any questions, please contact the dictating provider

## 2021-05-07 NOTE — PERIOPERATIVE NURSING NOTE
Patient received from Cath Lab  in 0/10 pain  Surgical site was clean, dry, and intact  VSS  Will continue to monitor til discharge criteria is met  Post-Op processes and procedures were explained and all related patient and family questions were answered  Discharge instructions were given and all related patient and family questions were answered

## 2021-05-07 NOTE — DISCHARGE INSTRUCTIONS
Implanted Venous Access Port Removal    WHAT YOU NEED TO KNOW:   An implanted venous access port is a device used to give treatments and take blood  It may also be called a central venous access device (CVAD)  The port is a small container that is placed under your skin, usually in your upper chest  A port can also be placed in your arm or abdomen  The port is attached to a catheter that enters a large vein  DISCHARGE INSTRUCTIONS:   Resume your normal diet  Small sips of flat soda will help with mild nausea  Prevent an infection:     Wash your hands often  Use soap and water  Clean your hands before and  after you care for your incision  Check your skin for infection every day  Look for redness, swelling, or fluid oozing from the incision site  Dressing may come off in 24 hours  Medical glue will peel off on its own in 5 to 10 days  You may shower 24 hours after procedure  Follow up with your healthcare provider as directed  Write down your questions so you remember to ask them during your visits  Activity:  You may return to your daily activities when the area heals  Contact Interventional Radiology at [phone] [place] PATIENTS: Contact Interventional Radiology at [phone]) Maicol Caldwell PATIENTS: Contact Interventional Radiology at [phone]) if:     You have a fever  You have persistent nausea  Your inciscion site is red, swollen, or draining pus  You have questions or concerns about your condition or care  Seek care immediately or call 911 if:  Blood soaks through your bandage  The skin over or around your incision breaks open  Your heart is jumping or fluttering  You have a headache, blurred vision, and feel confused  You have pain in your arm, neck, shoulder, or chest     You have trouble breathing that is getting worse over time

## 2021-05-07 NOTE — BRIEF OP NOTE (RAD/CATH)
INTERVENTIONAL RADIOLOGY PROCEDURE NOTE    Date: 5/7/2021    Procedure: IR PORT REMOVAL    Preoperative diagnosis:   1  Metastatic colorectal cancer ([facility] [address] )         Postoperative diagnosis: Same  Surgeon: Jefe Marshall MD     Assistant: None  No qualified resident was available  Blood loss: 3 mL    Specimens: Removed port     Findings: Successful right chest port removal    Complications: None immediate      Anesthesia: conscious sedation and local

## 2021-05-19 ENCOUNTER — OFFICE VISIT (OUTPATIENT)
Dept: SURGERY | Facility: CLINIC | Age: 76
End: 2021-05-19

## 2021-05-19 VITALS
HEIGHT: 72 IN | HEART RATE: 83 BPM | SYSTOLIC BLOOD PRESSURE: 130 MMHG | TEMPERATURE: 97.7 F | WEIGHT: 179.8 LBS | BODY MASS INDEX: 24.35 KG/M2 | DIASTOLIC BLOOD PRESSURE: 58 MMHG

## 2021-05-19 DIAGNOSIS — K40.90 LEFT INGUINAL HERNIA: ICD-10-CM

## 2021-05-19 DIAGNOSIS — Z48.89 ENCOUNTER FOR SURGICAL FOLLOW-UP CARE: Primary | ICD-10-CM

## 2021-05-19 DIAGNOSIS — K40.90 RIGHT INGUINAL HERNIA: ICD-10-CM

## 2021-05-19 PROCEDURE — 99024 POSTOP FOLLOW-UP VISIT: CPT | Performed by: SPECIALIST

## 2021-05-19 NOTE — PROGRESS NOTES
General Surgery Office Visit Follow up   [facility]  Patient: Dayan Torres   : 1945 Sex: male MRN: [id number]   CSN: [id number] PCP: Alyssia Taylor MD    Assessment/ Plan:  Dayan Torres is a 66 y o  male  day(s) POD #  2 weeks  S/p right inguinal hernia repair  Encounter for surgical follow-up care [Z48 89]    Plan  Stable postop   Follow-up in 3 weeks    SUBJECTIVE:    I am doing well my bowels have become much better I have no pain  OBJECTIVE:  No complaints  No fever no chills no rigors  Tolerating p o  Diet well  Normal bowel movement no constipation or diarrhea  Ambulating well   Vitals:   Temp 97 7 °F (36 5 °C) (Core)     Active medications:    Current Outpatient Medications:     atorvastatin (LIPITOR) 10 mg tablet, 1/2 tablet daily, Disp: 45 tablet, Rfl: 1    capecitabine (XELODA) 500 MG tablet, 500 mg 2 (two) times a day On 2 weeks off 1 week    2 tabs bid, Disp: , Rfl:     Cholecalciferol (VITAMIN D3) 3000 units TABS, Take by mouth, Disp: , Rfl:     Cyanocobalamin (VITAMIN B 12 PO), Take by mouth, Disp: , Rfl:     denosumab (PROLIA) 60 mg/mL, Inject 60 mg under the skin every 6 (six) months , Disp: , Rfl:     folic acid (FOLVITE) 1 mg tablet, Take by mouth daily, Disp: , Rfl:     Multiple Vitamins-Minerals (CENTRUM SILVER PO), Take by mouth, Disp: , Rfl:     oxyCODONE-acetaminophen (PERCOCET) 5-325 mg per tablet, Take 1 tablet by mouth every 4 (four) hours as needed for moderate pain for up to 16 daysMax Daily Amount: 6 tablets, Disp: 12 tablet, Rfl: 0    polyethylene glycol (MIRALAX) 17 g packet, Take 17 g by mouth daily for 7 days (Patient taking differently: Take 17 g by mouth as needed ), Disp: 119 g, Rfl: 0    potassium chloride (K-DUR) 10 mEq tablet, Take 10 mEq by mouth daily, Disp: , Rfl:     Physical Exam:   General Alert awake   Normocephalic atraumatic PERRLA   Lungs clear bilaterally  Cardiac normal S1 normal S2 with murmur  Abdomen soft,non tender Bowel sounds present  Skin: surgical dressing is C/D/I  Ext: No clubbing, cyanosis, edema  Surgical wound well  Healing/  Some induration   around incision no bruising    Visit Diagnosis:   Diagnoses and all orders for this visit:    Encounter for surgical follow-up care    Right inguinal hernia    Left inguinal hernia       Plan of care was discussed with patient in detail    Pertinent labs reviewed  Most Recent Labs:   No visits with results within 2 Week(s) from this visit  Latest known visit with results is:   Orders Only on 05/05/2021   Component Date Value Ref Range Status    SARS-CoV-2 05/05/2021 Negative  Negative Final       Pertinent images and available reads personally reviewed  Procedure: Ct Chest Abdomen Pelvis W Contrast    Result Date: 4/3/2021  Narrative: CT CHEST, ABDOMEN AND PELVIS WITH IV CONTRAST INDICATION:   C22 9: Malignant neoplasm of liver, not specified as primary or secondary C20: Malignant neoplasm of rectum  COMPARISON:  Compared PET scan of 2/21/2020 and outside MRI of 10/18/2019 TECHNIQUE: CT examination of the chest, abdomen and pelvis was performed  Axial, sagittal, and coronal 2D reformatted images were created from the source data and submitted for interpretation  Radiation dose length product (DLP) for this visit:  1355 29 mGy-cm   This examination, like all CT scans performed in the [facility], was performed utilizing techniques to minimize radiation dose exposure, including the use of iterative reconstruction and automated exposure control  IV Contrast:  100 mL of iohexol (OMNIPAQUE) Enteric Contrast: Enteric contrast was administered  FINDINGS: CHEST LUNGS:  Lungs are clear  There is no tracheal or endobronchial lesion  PLEURA:  Biapical pleural thickening with calcification along the left apex  Calcified pleural plaques predominantly on the left  HEART/GREAT VESSELS: Dilated ascending thoracic aorta measuring 4 cm    Unremarkable for patient's age  MEDIASTINUM AND ALEX:  9 mm right paratracheal lymph node is unchanged  CHEST WALL AND LOWER NECK:   Right chest port catheter in the cavoatrial region  ABDOMEN LIVER/BILIARY TREE:  Multiple irregularly-shaped peripherally enhancing lesions  Largest in the left hepatic lobe measuring 3 5 x 2 7 cm and 2 3 x 2 2 cm  In the right lobe measuring 1 7 x 1 5 cm  Portal venous images demonstrate residual central nonenhancement  Delayed images demonstrate no identifiable lesion with complete filling of a all lesions  GALLBLADDER:  No calcified gallstones  No pericholecystic inflammatory change  SPLEEN:  Unremarkable  PANCREAS:  Unremarkable  ADRENAL GLANDS:  Unremarkable  KIDNEYS/URETERS:  Unremarkable  No hydronephrosis  STOMACH AND BOWEL:  Unremarkable  APPENDIX:  No findings to suggest appendicitis  ABDOMINOPELVIC CAVITY:  No ascites  No pneumoperitoneum  No lymphadenopathy  VESSELS:  Unremarkable for patient's age  PELVIS REPRODUCTIVE ORGANS:  Unremarkable for patient's age  URINARY BLADDER:  Unremarkable  ABDOMINAL WALL/INGUINAL REGIONS:  Right para midline hernial defect with omental fat is unchanged  Bilateral small inguinal hernias  OSSEOUS STRUCTURES:  No acute fracture or destructive osseous lesion  Impression: 1  Multiple enhancing hepatic lesions appear mostly similar to prior MRI study  Repeat MRI as clinically indicated  2   Other findings as described are unchanged  Workstation performed: [id number]     Procedure: Ir Port Removal    Result Date: 5/7/2021  Narrative: PROCEDURE: Right chest port removal Procedural Personnel Attending physician(s): Dr Peter Barahona Pre-procedure diagnosis: Metastatic colorectal cancer Post-procedure diagnosis: Same Indication: Patient with right chest port which was noted to have leakage in the port catheter returns for port removal Complications: No immediate complications  Impression: Right chest port removal  Plan: Return to IR as needed  _______________________________________________________________ PROCEDURE SUMMARY: - Right chest port removal  Pre-procedure Consent: Informed consent for the procedure including risks, benefits and alternatives was obtained and time-out was performed prior to the procedure  Preparation (MIPS): The site was prepared and draped using all elements of maximal sterile barrier technique including sterile gloves, sterile gown, cap, mask, large sterile sheet, sterile ultrasound probe cover, hand hygiene and cutaneous antisepsis with 2% chlorhexidine  Anesthesia/sedation Level of anesthesia/sedation: Moderate sedation (conscious sedation) Anesthesia/sedation administered by: IR nurse under attending supervision with continuous monitoring of the patient's level of consciousness and physiologic status Total intra-service sedation time (minutes): 15 Port removal A 3 cm incision was made overlying the prior scar over the right chest port  Port was removed using combination of sharp and blunt dissection  Hemostasis was achieved  Port pocket was approximated using 3-0 Vicryl suture  Skin was closed using 4-0 Monocryl suture and skin adhesive  Additional Details Specimens removed: Right chest port Estimated blood loss (mL): 3 Standardized report: [email or web address] Attestation Signer name: [facility] I attest that I was present for the entire procedure  I reviewed the stored images and agree with the report as written  Workstation performed: [id number]     Procedure: Ir Port Check And Tpa Infusion    Result Date: 5/13/2021  Narrative: Port check Clinical History: Port flushing but not aspirating and flushing is painful  Contrast: 5cc of Omnipaque 350 Fluoro time: 0 2 Number of Images: 1 Technique: The patient was brought to the interventional radiology area identified verbally and by wristband   The patient was placed supine on the table, and after the port was sterilely accessed, contrast was injected into the port with digital subtraction imaging of the chest performed  Impression: Impression: Port check shows a cracked right subclavian vein port catheter due to "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  Recommend port removal   The patient no longer uses the port and wishes to have it removed and not have another port placed unless he has a recurrence of disease  This was discussed with the referring provider and a port removal will be scheduled with IR  Workstation performed: [id number]     Pertinent notes reviewed  Final Diagnosis   A  Left inguinal hernia sac and lipoma:  - Mesothelium lined fibroadipose tissue consistent with hernia sac  - Mature adipose tissue, compatible with lipoma  Counseling / Coordination of Care  Total Office time /unit time spent today 15minutes  Greater than 50% of total time was spent with the patient and / or family counseling and / or coordination of care  A description of the counseling / coordination of care:  I performed an interim history, pertinent images and labs, performed a physical examination to arrive at the plan delineated above with associated thought processes  Derrick Romero MD MS [facility]  05/19/21 9:16 AM        Portions of the record may have been created with voice recognition software  Occasional wrong word or "sound a like" substitutions may have occurred due to the inherent limitations of voice recognition software  Read the chart carefully and recognize, using context, where substitutions have occurred

## 2021-05-27 ENCOUNTER — OFFICE VISIT (OUTPATIENT)
Dept: GASTROENTEROLOGY | Facility: CLINIC | Age: 76
End: 2021-05-27
Payer: COMMERCIAL

## 2021-05-27 VITALS
DIASTOLIC BLOOD PRESSURE: 58 MMHG | HEART RATE: 72 BPM | SYSTOLIC BLOOD PRESSURE: 134 MMHG | BODY MASS INDEX: 24.24 KG/M2 | WEIGHT: 179 LBS | HEIGHT: 72 IN

## 2021-05-27 DIAGNOSIS — K59.01 SLOW TRANSIT CONSTIPATION: Primary | ICD-10-CM

## 2021-05-27 DIAGNOSIS — K43.2 INCISIONAL HERNIA, WITHOUT OBSTRUCTION OR GANGRENE: ICD-10-CM

## 2021-05-27 DIAGNOSIS — D12.6 ADENOMATOUS POLYP OF COLON, UNSPECIFIED PART OF COLON: ICD-10-CM

## 2021-05-27 DIAGNOSIS — Z85.038 HISTORY OF COLON CANCER: ICD-10-CM

## 2021-05-27 PROCEDURE — 99214 OFFICE O/P EST MOD 30 MIN: CPT | Performed by: INTERNAL MEDICINE

## 2021-05-27 NOTE — PROGRESS NOTES
[facility] - Outpatient Follow-up Note  Maciej Cervantes 66 y o  male MRN: [id number]  Encounter: [id number]          ASSESSMENT AND PLAN:      1  Slow transit constipation    Patient has constipation still persists  After his hernia surgery however it has improved  He denies any abdominal pain or discomfort  Denies any nausea or vomiting  Patient had inguinal hernia surgery  He was told that the hernia was quite large  During colonoscopy sigmoid narrowing was noted  It is possible that this narrowing in the sigmoid colon was as a result of partial sigmoid colon being inside the hernia itself  There is a stricture at the hepatic flexure  The biopsies have been negative for any malignancy  Patient is asymptomatic  He also has a small incisional hernia in the right upper quadrant  Patient denies any other significant symptoms at this time  2  History of colon cancer    Patient had colon cancer in 2017  Surgical resection was performed  He denies any significant symptoms pertaining to this  3  Incisional hernia, without obstruction or gangrene    Incisional hernia noted in the right upper quadrant  Patient also had gastrostomy tube placed after colon surgery  Scar of gastrostomy tube noted  There is a small herniation in the right upper quadrant  This is likely from laparoscopic port  4  Adenomatous polyp of colon, unspecified part of colon    History of colon polyp  History of colon cancer  Next colonoscopy in two years  ______________________________________________________________________    SUBJECTIVE:    Doing well overall  Denies any chest pain, cough or wheezing  There is no palpitation, orthopnea or exertional dyspnea  After had inguinal hernia surgery he is doing better  He does not have any abdominal pain or discomfort  There is no nausea vomiting  REVIEW OF SYSTEMS IS OTHERWISE NEGATIVE        Historical Information   Past Medical History:   Diagnosis Date    Cancer ([facility] [address] )     rectal    Cancer, colon (HCC)     Heart murmur     High cholesterol     HLD (hyperlipidemia)     border line    Hx of radiation therapy     Portacath in place     right chest wall     Rectal cancer [facility])      Past Surgical History:   Procedure Laterality Date    COLONOSCOPY      COLOSTOMY TAKEDOWN/REVERSE ANTON  2018    6 months after    HARTMANS PROCEDURE  2018    IR BALLOON-OCCLUDED ANTEGRADE TRANSVENOUS OBLITERATION (BATO)  5/3/2021    IR PORT REMOVAL  2021    LIVER BIOPSY      in 2017    PORTACATH PLACEMENT      in 2016   [address] [facility] HERNIA,5+Y/O,REDUCIBL Left 2021    Procedure: REPAIR HERNIA INGUINAL WITH MESH;  Surgeon: Lupe Siddiqi MD;  Location: [address] [place] [address];  Service: General     Social History   Social History     Substance and Sexual Activity   Alcohol Use Yes    Comment: occasionally  Social History     Substance and Sexual Activity   Drug Use Never     Social History     Tobacco Use   Smoking Status Former Smoker    Packs/day: 1 00    Years: 10 00    Pack years: 10 00    Quit date:     Years since quittin 4   Smokeless Tobacco Never Used   Tobacco Comment    quit 20 years ago     Family History   Problem Relation Age of Onset    No Known Problems Mother     No Known Problems Father        Meds/Allergies       Current Outpatient Medications:     atorvastatin (LIPITOR) 10 mg tablet    capecitabine (XELODA) 500 MG tablet    Cholecalciferol (VITAMIN D3) 3000 units TABS    Cyanocobalamin (VITAMIN B 12 PO)    denosumab (PROLIA) 60 mg/mL    folic acid (FOLVITE) 1 mg tablet    Multiple Vitamins-Minerals (CENTRUM SILVER PO)    potassium chloride (K-DUR) 10 mEq tablet    polyethylene glycol (MIRALAX) 17 g packet    No Known Allergies        Objective     Blood pressure 134/58, pulse 72, height 6' (1 829 m), weight 81 2 kg (179 lb)  Body mass index is 24 28 kg/m²        PHYSICAL EXAM:      General Appearance:   Alert, cooperative, no distress   HEENT:   Normocephalic, atraumatic, anicteric      Neck:  Supple, symmetrical, trachea midline   Lungs:   Clear to auscultation bilaterally; no rales, rhonchi or wheezing; respirations unlabored    Heart[de-identified]   Regular rate and rhythm; no murmur, rub, or gallop  Abdomen:   Soft, non-tender, non-distended; normal bowel sounds; no masses, no organomegaly   Incisional hernia noted in the right upper quadrant  Incision for G-tube noted in the epigastrium area  Abdomen is nontender   Genitalia:   Deferred    Rectal:   Deferred    Extremities:  No cyanosis, clubbing or edema    Pulses:  2+ and symmetric    Skin:  No jaundice, rashes, or lesions    Lymph nodes:  No palpable cervical lymphadenopathy        Lab Results:   No visits with results within 1 Day(s) from this visit  Latest known visit with results is:   Orders Only on 05/05/2021   Component Date Value    SARS-CoV-2 05/05/2021 Negative          Radiology Results:   Ir Port Removal    Result Date: 5/7/2021  Narrative: PROCEDURE: Right chest port removal Procedural Personnel Attending physician(s): Dr Rica Maria Pre-procedure diagnosis: Metastatic colorectal cancer Post-procedure diagnosis: Same Indication: Patient with right chest port which was noted to have leakage in the port catheter returns for port removal Complications: No immediate complications  Impression: Right chest port removal  Plan: Return to IR as needed  _______________________________________________________________ PROCEDURE SUMMARY: - Right chest port removal  Pre-procedure Consent: Informed consent for the procedure including risks, benefits and alternatives was obtained and time-out was performed prior to the procedure   Preparation (MIPS): The site was prepared and draped using all elements of maximal sterile barrier technique including sterile gloves, sterile gown, cap, mask, large sterile sheet, sterile ultrasound probe cover, hand hygiene and cutaneous antisepsis with 2% chlorhexidine  Anesthesia/sedation Level of anesthesia/sedation: Moderate sedation (conscious sedation) Anesthesia/sedation administered by: IR nurse under attending supervision with continuous monitoring of the patient's level of consciousness and physiologic status Total intra-service sedation time (minutes): 15 Port removal A 3 cm incision was made overlying the prior scar over the right chest port  Port was removed using combination of sharp and blunt dissection  Hemostasis was achieved  Port pocket was approximated using 3-0 Vicryl suture  Skin was closed using 4-0 Monocryl suture and skin adhesive  Additional Details Specimens removed: Right chest port Estimated blood loss (mL): 3 Standardized report: [email or web address] Attestation Signer name: [facility] I attest that I was present for the entire procedure  I reviewed the stored images and agree with the report as written  Workstation performed: [id number]     Ir Port Check And Tpa Infusion    Result Date: 5/13/2021  Narrative: Port check Clinical History: Port flushing but not aspirating and flushing is painful  Contrast: 5cc of Omnipaque 350 Fluoro time: 0 2 Number of Images: 1 Technique: The patient was brought to the interventional radiology area identified verbally and by wristband  The patient was placed supine on the table, and after the port was sterilely accessed, contrast was injected into the port with digital subtraction imaging of the chest performed  Impression: Impression: Port check shows a cracked right subclavian vein port catheter due to "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

## 2021-06-09 ENCOUNTER — OFFICE VISIT (OUTPATIENT)
Dept: SURGERY | Facility: CLINIC | Age: 76
End: 2021-06-09

## 2021-06-09 VITALS
HEART RATE: 92 BPM | SYSTOLIC BLOOD PRESSURE: 118 MMHG | BODY MASS INDEX: 24.16 KG/M2 | HEIGHT: 72 IN | DIASTOLIC BLOOD PRESSURE: 52 MMHG | WEIGHT: 178.4 LBS | TEMPERATURE: 97.3 F

## 2021-06-09 DIAGNOSIS — Z48.89 ENCOUNTER FOR SURGICAL FOLLOW-UP CARE: ICD-10-CM

## 2021-06-09 DIAGNOSIS — C19 METASTATIC COLORECTAL CANCER (HCC): ICD-10-CM

## 2021-06-09 DIAGNOSIS — K43.0 INCISIONAL HERNIA WITH OBSTRUCTION BUT NO GANGRENE: ICD-10-CM

## 2021-06-09 DIAGNOSIS — K40.90 RIGHT INGUINAL HERNIA: ICD-10-CM

## 2021-06-09 DIAGNOSIS — K40.90 LEFT INGUINAL HERNIA: Primary | ICD-10-CM

## 2021-06-09 DIAGNOSIS — I10 HYPERTENSION: ICD-10-CM

## 2021-06-09 DIAGNOSIS — E83.119 HEMOCHROMATOSIS: ICD-10-CM

## 2021-06-09 PROCEDURE — 99024 POSTOP FOLLOW-UP VISIT: CPT | Performed by: SPECIALIST

## 2021-06-09 NOTE — PROGRESS NOTES
General Surgery Office Visit Follow up   [facility]  Patient: Ruth Wise   : 1945 Sex: male MRN: [id number]   CSN: [id number] PCP: Mercy Bustillos MD    Assessment/ Plan:  Ruth Wise is a 66 y o  male  day(s) POD #  5 weeks  S/p  Left inguinal hernia [K40 90]    Plan  Stable postop    patient wants to wait and watch for right inguinal hernia which is asymptomatic and the incisional hernia secondary to colostomy site closure on the right the upper abdominal  Patient has metastatic colon cancer/  Port-A-Cath has been removed    SUBJECTIVE:   I am doing very well I have no pain my hernia site is nice and healed well  OBJECTIVE:  No complaints  No fever no chills no rigors  Tolerating p o  Diet well  Normal bowel movement no constipation or diarrhea  Ambulating well   Vitals:   /52 (BP Location: Left arm, Patient Position: Sitting, Cuff Size: Large)   Pulse 92   Temp (!) 97 3 °F (36 3 °C) (Core)   Ht 6' (1 829 m)   Wt 80 9 kg (178 lb 6 4 oz)   BMI 24 20 kg/m²     Active medications:    Current Outpatient Medications:     atorvastatin (LIPITOR) 10 mg tablet, 1/2 tablet daily, Disp: 45 tablet, Rfl: 1    capecitabine (XELODA) 500 MG tablet, 500 mg 2 (two) times a day On 2 weeks off 1 week    2 tabs bid, Disp: , Rfl:     Cholecalciferol (VITAMIN D3) 3000 units TABS, Take by mouth, Disp: , Rfl:     Cyanocobalamin (VITAMIN B 12 PO), Take by mouth, Disp: , Rfl:     denosumab (PROLIA) 60 mg/mL, Inject 60 mg under the skin every 6 (six) months , Disp: , Rfl:     folic acid (FOLVITE) 1 mg tablet, Take by mouth daily, Disp: , Rfl:     Multiple Vitamins-Minerals (CENTRUM SILVER PO), Take by mouth, Disp: , Rfl:     potassium chloride (K-DUR) 10 mEq tablet, Take 10 mEq by mouth daily, Disp: , Rfl:     polyethylene glycol (MIRALAX) 17 g packet, Take 17 g by mouth daily for 7 days (Patient taking differently: Take 17 g by mouth as needed ), Disp: 119 g, Rfl: 0    Physical Exam:   General Alert awake   Normocephalic atraumatic PERRLA   Lungs clear bilaterally  Cardiac normal S1 normal S2  Abdomen soft,non tender Bowel sounds present small reducible ventral hernia /incisional at the site of stoma closure right  Side upper abdominal small right inguinal hernia  Fat containing asymptomatic  Skin:  moist  Ext: No clubbing, cyanosis, edema  Surgical wound well healed    Visit Diagnosis:   Diagnoses and all orders for this visit:    Left inguinal hernia    Encounter for surgical follow-up care    Incisional hernia with obstruction but no gangrene    Right inguinal hernia    Hypertension    Hemochromatosis    Metastatic colorectal cancer ([facility] [address] )       Plan of care was discussed with patient in detail    Pertinent labs reviewed  Most Recent Labs:   No visits with results within 2 Week(s) from this visit  Latest known visit with results is:   Orders Only on 05/05/2021   Component Date Value Ref Range Status    SARS-CoV-2 05/05/2021 Negative  Negative Final       Pertinent images and available reads personally reviewed    Pertinent notes reviewed    Counseling / Coordination of Care  Total Office time /unit time spent today 15minutes  Greater than 50% of total time was spent with the patient and / or family counseling and / or coordination of care  A description of the counseling / coordination of care:  I performed an interim history, pertinent images and labs, performed a physical examination to arrive at the plan delineated above with associated thought processes  Cici Cm MD MS FRCS FACS  [facility]  06/09/21 9:43 AM        Portions of the record may have been created with voice recognition software  Occasional wrong word or "sound a like" substitutions may have occurred due to the inherent limitations of voice recognition software  Read the chart carefully and recognize, using context, where substitutions have occurred

## 2021-06-10 ENCOUNTER — OFFICE VISIT (OUTPATIENT)
Dept: INTERNAL MEDICINE CLINIC | Facility: CLINIC | Age: 76
End: 2021-06-10
Payer: COMMERCIAL

## 2021-06-10 VITALS
BODY MASS INDEX: 24.24 KG/M2 | HEIGHT: 72 IN | TEMPERATURE: 97.8 F | HEART RATE: 59 BPM | WEIGHT: 179 LBS | OXYGEN SATURATION: 99 %

## 2021-06-10 DIAGNOSIS — E83.119 HEMOCHROMATOSIS, UNSPECIFIED HEMOCHROMATOSIS TYPE: ICD-10-CM

## 2021-06-10 DIAGNOSIS — C19 METASTATIC COLORECTAL CANCER (HCC): ICD-10-CM

## 2021-06-10 DIAGNOSIS — I10 ESSENTIAL HYPERTENSION: Primary | ICD-10-CM

## 2021-06-10 DIAGNOSIS — K55.20: ICD-10-CM

## 2021-06-10 DIAGNOSIS — E78.00 HYPERCHOLESTEREMIA: ICD-10-CM

## 2021-06-10 PROCEDURE — 99214 OFFICE O/P EST MOD 30 MIN: CPT | Performed by: INTERNAL MEDICINE

## 2021-06-10 PROCEDURE — 1036F TOBACCO NON-USER: CPT | Performed by: INTERNAL MEDICINE

## 2021-06-10 PROCEDURE — 1160F RVW MEDS BY RX/DR IN RCRD: CPT | Performed by: INTERNAL MEDICINE

## 2021-06-10 NOTE — PATIENT INSTRUCTIONS
Recommend to consider discussing with your hemato oncologist about your at the ecchymosis and black and blue inderjit  Recommend to consider getting protime and PTT done and additional workup by him as appropriate  My most likely it is nothing to worry about the it happens signal elderly people for multiple reasons  If you see any major bleeding anywhere please let us know  If you have any symptoms of anemia please let us know    Cholesterol    Eat low cholesterol diet    Continue taking your cholesterol medicine as advised    Call if any side effects    Lipid Profile and LFT prior to next visit or as advised  ( You should Get periodically to monitor liver side effects)    Know you LDL ( Bad Cholesterol ) , HDL ( Good Cholesterol ) and Triglyceride goal    Follow with Consultants as per their and our suggestion    Follow up in 12 week(s) or as needed earlier    Follow all instructions as advised and discussed  Take your medications as prescribed  Call the office immediately if you experience any side effects  Ask questions if you do not understand  Keep your scheduled appointment as advised or come sooner if necessary or in doubt  Best time to call for non-urgent matter or questions on weekdays is between 9am and 12 noon  See physician for any new symptoms or worsening of current symptoms  Urgent or emergent situations call 911 and report to nearest emergency room  I spent  30 -40 minutes taking care of this patient including clinical care, conseling, collaboration, chart, lab and consultaion review as appropriate    Patient is to get labs 1 week(s) prior to next visit if advised    Also ask your hemato oncologist to give you copy of the blood test the so you can bring with your visit that will be helpful      Also try to learn about CEA and alpha-fetoprotein

## 2021-06-10 NOTE — ASSESSMENT & PLAN NOTE
Continue low-cholesterol diet  Continue atorvastatin 10 mg daily  Due for CMP lipid profile prior to next visit

## 2021-06-10 NOTE — ASSESSMENT & PLAN NOTE
Patient remains on oral capsules for the maintenance by hemato oncologist   Patient will continue to follow with oncologist   Alia Holden

## 2021-06-10 NOTE — ASSESSMENT & PLAN NOTE
04/07/2021:  Colonoscopy  1  AVM noted in the distal rectum  2  Relative narrowing of the rectosigmoid area probably from previous surgery and radiation therapy  3  AVM also noted in the ascending colon  4  There is area of relative narrowing in the hepatic flexure area  This area appears benign  Biopsies taken        Follow-up colonoscopy in 3 years that will be in April 2024

## 2021-06-10 NOTE — PROGRESS NOTES
[facility] Office Visit Note  06/10/21     Brendon Crowley 66 y o  male MRN: [id number]  : 1945    Assessment:     AV (angiodysplasia malformation of colon)  2021:  Colonoscopy  1  AVM noted in the distal rectum  2  Relative narrowing of the rectosigmoid area probably from previous surgery and radiation therapy  3  AVM also noted in the ascending colon  4  There is area of relative narrowing in the hepatic flexure area  This area appears benign  Biopsies taken  Follow-up colonoscopy in 3 years that will be in 2024    Metastatic colorectal cancer [facility])  Patient remains on oral capsules for the maintenance by hemato oncologist   Patient will continue to follow with oncologist   H    Hypercholesteremia  Continue low-cholesterol diet  Continue atorvastatin 10 mg daily  Due for CMP lipid profile prior to next visit  Diagnoses and all orders for this visit:    Essential hypertension    Metastatic colorectal cancer ([facility] [address] )    AV (angiodysplasia malformation of colon)    Hypercholesteremia    Hemochromatosis, unspecified hemochromatosis type          Discussion Summary and Plan: Today's care plan and medications were reviewed with patient in detail and all their questions answered to their satisfaction  In summary blood pressure well controlled  Hyperlipidemia remains on statin due for lipid profile and CMP prior to next visit  CA of the colon stable status post colonoscopy port is out  Ecchymosis issue reviewed  No major bleeding from anywhere no symptoms of anemia  Recommend to have him discussed with hemato oncologist consider PT PTT if it is not done most recently  Of the a per patient's markers are normal   Recommend to get us the copy of the lab report  Blood pressure is fair        Chief Complaint   Patient presents with    Follow-up     Frequent, easy bruising, ongoing, hemochromatosis, blood pressure follow-up,    Hyperlipidemia    Cancer Subjective:  Chronic disease management as well as new complaint of ecchymosis  Ecchymosis are mostly in the exposed part  There is no major bleeding anywhere else no symptoms of anemia no nose bleed gum bleeding joint bleeding or internal bleeding  Patient is also under care of hemato oncologist patient is not on any aspirin Plavix or any other blood thinner    Patient series of events test were done since last visit were reviewed  Patient had a port removal done  Patient also underwent uncomplicated left inguinal hernia surgery by Dr Bravo the gait  And healing well    CT scan of the chest abdomen pelvis reviewed  Patient also underwent colonoscopy report as outlined underneath  1  AVM noted in the distal rectum  2  Relative narrowing of the rectosigmoid area probably from previous surgery and radiation therapy  3  AVM also noted in the ascending colon  4  There is area of relative narrowing in the hepatic flexure area  This area appears benign  Biopsies taken          PSA:  PSA was done by [facility] oncologist   Patient will find out the results and will let us know       echhymosis chronic: baseline    Colorectal cancer:  Patient remains under care of oncologist now getting blood test monthly hand  Medicine as outlined above  Patient denies any abdominal pain nausea vomiting constipation diarrhea hematemesis melena or bleeding  Hyperlipidemia:  Tolerating statin for how every day 5 mg due for lipid profile  Blood sugar will monitor     He hemochromatosis per [facility] oncologist a blood being monitored by them  Periodically  Of he got home motor now vaccine for COVID-19 did tolerated it without side effect        Orders Only on 05/05/2021  SARS-CoV-2                Value: Negative           Dt: 05/05/2021  ------------  Admission on 05/04/2021, Discharged on 05/04/2021  Case Report               Value:                    Dt: 05/04/2021                   Value:Surgical Pathology Report                         Case: [id number]                                 Authorizing Provider:  Caryn Alvarado MD          Collected:           05/04/2021 1411            Ordering Location:     [address] [place] [address] Received:            05/04/2021 1933                                   Operating Room                                                             Pathologist:           Chano Oglesby MD                                                      Specimen:    Hernia Sac, Left Inguinal, Left inguinal hernia sac and lipoma                         Final Diagnosis           Value:                    Dt: 05/04/2021                   Value: This result contains rich text formatting which cannot be displayed here       ------------  Office Visit on 04/26/2021  Ventricular Rate          Value: 59(BPM)            Dt: 04/26/2021  Atrial Rate               Value: 59(BPM)            Dt: 04/26/2021  MI Interval               Value: 128(ms)            Dt: 04/26/2021  QRSD Interval             Value: 100(ms)            Dt: 04/26/2021  QT Interval               Value: 394(ms)            Dt: 04/26/2021  QTC Interval              Value: 390(ms)            Dt: 04/26/2021  P Axis                    Value: 72(degrees)        Dt: 04/26/2021  QRS Axis                  Value: 51(degrees)        Dt: 04/26/2021  T Wave Axis               Value: 62(degrees)        Dt: 04/26/2021  ------------  Hospital Outpatient Visit on 04/07/2021  Case Report               Value:                    Dt: 04/07/2021                   Value:Surgical Pathology Report                         Case: [id number]                                 Authorizing Provider:  Som Cedillo MD      Collected:           04/07/2021 0945            Ordering Location:     [facility]   Received:            04/07/2021 [facility]                                                                     Pathologist: Moriah Silverio MD                                                 Specimen:    Large Intestine, Hepatic Flexure, 1, Biopsy of stricture, hepatic flexure              Final Diagnosis           Value:                    Dt: 04/07/2021                   Value: This result contains rich text formatting which cannot be displayed here  Additional Information    Value:                    Dt: 04/07/2021                   Value: This result contains rich text formatting which cannot be displayed here  Gross Description         Value:                    Dt: 04/07/2021                   Value: This result contains rich text formatting which cannot be displayed here  -        The following portions of the patient's history were reviewed and updated as appropriate: allergies, current medications, past family history, past medical history, past social history, past surgical history and problem list     Review of Systems   Constitutional: Negative for chills, fatigue, fever and unexpected weight change  HENT: Negative for ear pain, postnasal drip, sinus pain and sore throat  Eyes: Negative for pain and redness  Respiratory: Negative for cough and shortness of breath  Cardiovascular: Negative for chest pain, palpitations and leg swelling  Gastrointestinal: Negative for abdominal pain, diarrhea and nausea  Non specific rectal symptoms persisits,    Endocrine: Negative for cold intolerance, polydipsia and polyuria  Genitourinary: Negative for dysuria, frequency and urgency  Musculoskeletal: Negative for arthralgias, gait problem and myalgias  Skin: Negative for rash and wound  Allergic/Immunologic: Negative  Neurological: Negative for dizziness and headaches  Hematological: Negative for adenopathy  Does not bruise/bleed easily  Psychiatric/Behavioral: Negative  Negative for behavioral problems, confusion, dysphoric mood, hallucinations, self-injury and sleep disturbance   The patient is not nervous/anxious and is not hyperactive  Historical Information   Patient Active Problem List   Diagnosis    Diarrhea    Hyperglycemia    Hypertension    Leukocytosis    Metastatic colorectal cancer (HCC)    Hypercholesteremia    Bruise of both arms    Hemochromatosis    Vitamin D deficiency    Age related osteoporosis    Bleeding per rectum    Incisional hernia with obstruction but no gangrene    Left inguinal hernia    Right inguinal hernia    Other constipation    Encounter for surgical follow-up care    AV (angiodysplasia malformation of colon)     Past Medical History:   Diagnosis Date    Cancer ([address] )     rectal    Cancer, colon (HCC)     Heart murmur     High cholesterol     HLD (hyperlipidemia)     border line    Hx of radiation therapy     Portacath in place     right chest wall     Rectal cancer [facility])      Past Surgical History:   Procedure Laterality Date    COLONOSCOPY      COLOSTOMY TAKEDOWN/REVERSE ANTON  2018    6 months after    HARTMANS PROCEDURE  2018    IR BALLOON-OCCLUDED ANTEGRADE TRANSVENOUS OBLITERATION (BATO)  5/3/2021    IR PORT REMOVAL  2021    LIVER BIOPSY      in 2017    PORTACATH PLACEMENT      in     CO REPAIR ING HERNIA,5+Y/O,REDUCIBL Left 2021    Procedure: REPAIR HERNIA INGUINAL WITH MESH;  Surgeon: Martin Cardozo MD;  Location: [facility];  Service: General     Social History     Substance and Sexual Activity   Alcohol Use Yes    Comment: occasionally        Social History     Substance and Sexual Activity   Drug Use Never     Social History     Tobacco Use   Smoking Status Former Smoker    Packs/day: 1 00    Years: 10 00    Pack years: 10 00    Quit date:     Years since quittin 4   Smokeless Tobacco Never Used   Tobacco Comment    quit 20 years ago     Family History   Problem Relation Age of Onset    No Known Problems Mother     No Known Problems Father      Health Maintenance Due   Topic    DTaP,Tdap,and Td Vaccines (1 - Tdap)    Pneumococcal Vaccine: 65+ Years (1 of 1 - PPSV23)      Meds/Allergies       Current Outpatient Medications:     atorvastatin (LIPITOR) 10 mg tablet, 1/2 tablet daily, Disp: 45 tablet, Rfl: 1    capecitabine (XELODA) 500 MG tablet, 500 mg 2 (two) times a day On 2 weeks off 1 week  2 tabs bid, Disp: , Rfl:     Cholecalciferol (VITAMIN D3) 3000 units TABS, Take by mouth, Disp: , Rfl:     Cyanocobalamin (VITAMIN B 12 PO), Take by mouth, Disp: , Rfl:     denosumab (PROLIA) 60 mg/mL, Inject 60 mg under the skin every 6 (six) months , Disp: , Rfl:     folic acid (FOLVITE) 1 mg tablet, Take by mouth daily, Disp: , Rfl:     Multiple Vitamins-Minerals (CENTRUM SILVER PO), Take by mouth, Disp: , Rfl:     polyethylene glycol (MIRALAX) 17 g packet, Take 17 g by mouth daily for 7 days (Patient taking differently: Take 17 g by mouth as needed ), Disp: 119 g, Rfl: 0    potassium chloride (K-DUR) 10 mEq tablet, Take 10 mEq by mouth daily, Disp: , Rfl:       Objective:    Vitals:   Pulse 59   Temp 97 8 °F (36 6 °C)   Ht 6' (1 829 m)   Wt 81 2 kg (179 lb)   SpO2 99%   BMI 24 28 kg/m²   Body mass index is 24 28 kg/m²  Vitals:    06/10/21 0857   Weight: 81 2 kg (179 lb)       Physical Exam  Vitals signs and nursing note reviewed  Constitutional:       General: He is not in acute distress  Appearance: Normal appearance  He is well-developed  He is not ill-appearing, toxic-appearing or diaphoretic  HENT:      Head: Normocephalic and atraumatic  Right Ear: There is no impacted cerumen  Left Ear: There is no impacted cerumen  Nose: No congestion  Mouth/Throat:      Pharynx: No oropharyngeal exudate  Eyes:      General:         Right eye: No discharge  Left eye: No discharge  Conjunctiva/sclera: Conjunctivae normal    Neck:      Thyroid: No thyromegaly  Vascular: No carotid bruit or JVD     Cardiovascular:      Rate and Rhythm: Regular rhythm  Heart sounds: Normal heart sounds  Pulmonary:      Effort: No respiratory distress  Breath sounds: Normal breath sounds  No wheezing or rales  Abdominal:      General: Bowel sounds are normal  There is no distension  Palpations: There is no mass  Tenderness: There is no abdominal tenderness  There is no rebound  Musculoskeletal:      Right lower leg: No edema  Left lower leg: No edema  Lymphadenopathy:      Cervical: No cervical adenopathy  Skin:     General: Skin is warm  Findings: No rash  Comments:   Ecchymosis is present the different size mostly on exposed part of the body on both forearms   Neurological:      General: No focal deficit present  Mental Status: He is oriented to person, place, and time  Mental status is at baseline  Psychiatric:         Mood and Affect: Mood normal          Thought Content: Thought content normal          Judgment: Judgment normal          Lab Review   Orders Only on 05/05/2021   Component Date Value Ref Range Status    SARS-CoV-2 05/05/2021 Negative  Negative Final   Admission on 05/04/2021, Discharged on 05/04/2021   Component Date Value Ref Range Status    Case Report 05/04/2021    Final                    Value:Surgical Pathology Report                         Case: [id number]                                   Authorizing Provider:  Manas Pagan MD          Collected:           05/04/2021 1411              Ordering Location:     [address] [place] [address] Received:            05/04/2021 1933                                     Operating Room                                                               Pathologist:           Vaibhav Rae MD                                                        Specimen:    Hernia Sac, Left Inguinal, Left inguinal hernia sac and lipoma                             Final Diagnosis 05/04/2021    Final                    Value: This result contains rich text formatting which cannot be displayed here   Additional Information 05/04/2021    Final                    Value: This result contains rich text formatting which cannot be displayed here  Braydon Prather Description 05/04/2021    Final                    Value: This result contains rich text formatting which cannot be displayed here  Orders Only on 04/28/2021   Component Date Value Ref Range Status    SARS-CoV-2 04/28/2021 Negative  Negative Final   Office Visit on 04/26/2021   Component Date Value Ref Range Status    Ventricular Rate 04/26/2021 59  BPM Final    Atrial Rate 04/26/2021 59  BPM Final    NH Interval 04/26/2021 128  ms Final    QRSD Interval 04/26/2021 100  ms Final    QT Interval 04/26/2021 394  ms Final    QTC Interval 04/26/2021 390  ms Final    P Axis 04/26/2021 72  degrees Final    QRS Axis 04/26/2021 51  degrees Final    T Wave [place] 04/26/2021 62  degrees Final         Patient Instructions   Recommend to consider discussing with your hemato oncologist about your at the ecchymosis and black and blue inderjit  Recommend to consider getting protime and PTT done and additional workup by him as appropriate  My most likely it is nothing to worry about the it happens signal elderly people for multiple reasons  If you see any major bleeding anywhere please let us know  If you have any symptoms of anemia please let us know    Cholesterol    Eat low cholesterol diet    Continue taking your cholesterol medicine as advised    Call if any side effects    Lipid Profile and LFT prior to next visit or as advised  ( You should Get periodically to monitor liver side effects)    Know you LDL ( Bad Cholesterol ) , HDL ( Good Cholesterol ) and Triglyceride goal    Follow with Consultants as per their and our suggestion    Follow up in 12 week(s) or as needed earlier    Follow all instructions as advised and discussed  Take your medications as prescribed      Call the office immediately if you experience any side effects  Ask questions if you do not understand  Keep your scheduled appointment as advised or come sooner if necessary or in doubt  Best time to call for non-urgent matter or questions on weekdays is between 9am and 12 noon  See physician for any new symptoms or worsening of current symptoms  Urgent or emergent situations call 911 and report to nearest emergency room  I spent  30 -40 minutes taking care of this patient including clinical care, conseling, collaboration, chart, lab and consultaion review as appropriate    Patient is to get labs 1 week(s) prior to next visit if advised    Also ask your hemato oncologist to give you copy of the blood test the so you can bring with your visit that will be helpful  Also try to learn about CEA and alpha-fetoprotein             Dr Ana Zaragoza MD  [facility]       "This note has been constructed using a voice recognition system  Therefore there may be syntax, spelling, and/or grammatical errors   Please call if you have any questions  "

## 2021-07-16 ENCOUNTER — HOSPITAL ENCOUNTER (OUTPATIENT)
Dept: RADIOLOGY | Facility: HOSPITAL | Age: 76
Discharge: HOME | End: 2021-07-16
Payer: COMMERCIAL

## 2021-07-16 DIAGNOSIS — E83.110 HEREDITARY HEMOCHROMATOSIS (HCC): ICD-10-CM

## 2021-07-16 PROCEDURE — A9585 GADOBUTROL INJECTION: HCPCS | Performed by: RADIOLOGY

## 2021-07-16 PROCEDURE — 74183 MRI ABD W/O CNTR FLWD CNTR: CPT

## 2021-07-16 RX ADMIN — GADOBUTROL 8 ML: 604.72 INJECTION INTRAVENOUS at 14:43

## 2021-08-08 NOTE — TELEPHONE ENCOUNTER
----- Message from Cheryl Dugan LPN sent at 2/9/1948  3:12 PM EDT -----  Patient aware  Educated  Colon 3 years  Please call patient to schedule f/u to colon   Thank you
Spoke to patient and scheduled FU to colonoscopy with Dr Catalina Lua   5/27/21
Alert and interactive, no focal deficits

## 2021-09-13 DIAGNOSIS — E78.00 HYPERCHOLESTEREMIA: ICD-10-CM

## 2021-09-14 RX ORDER — ATORVASTATIN CALCIUM 10 MG/1
TABLET, FILM COATED ORAL
Qty: 45 TABLET | Refills: 1 | Status: SHIPPED | OUTPATIENT
Start: 2021-09-14 | End: 2022-01-20

## 2021-09-23 ENCOUNTER — OFFICE VISIT (OUTPATIENT)
Dept: INTERNAL MEDICINE CLINIC | Facility: CLINIC | Age: 76
End: 2021-09-23
Payer: COMMERCIAL

## 2021-09-23 DIAGNOSIS — E83.119 HEMOCHROMATOSIS, UNSPECIFIED HEMOCHROMATOSIS TYPE: ICD-10-CM

## 2021-09-23 DIAGNOSIS — C19 METASTATIC COLORECTAL CANCER (HCC): Primary | ICD-10-CM

## 2021-09-23 DIAGNOSIS — R60.0 EDEMA OF RIGHT LOWER LEG: ICD-10-CM

## 2021-09-23 DIAGNOSIS — I10 ESSENTIAL HYPERTENSION: ICD-10-CM

## 2021-09-23 DIAGNOSIS — E78.00 HYPERCHOLESTEREMIA: ICD-10-CM

## 2021-09-23 DIAGNOSIS — R73.9 HYPERGLYCEMIA: ICD-10-CM

## 2021-09-23 DIAGNOSIS — E55.9 VITAMIN D DEFICIENCY: ICD-10-CM

## 2021-09-23 PROCEDURE — 99214 OFFICE O/P EST MOD 30 MIN: CPT | Performed by: INTERNAL MEDICINE

## 2021-09-23 RX ORDER — POTASSIUM CHLORIDE 750 MG/1
TABLET, FILM COATED, EXTENDED RELEASE ORAL
COMMUNITY

## 2021-09-23 RX ORDER — FUROSEMIDE 20 MG/1
TABLET ORAL
COMMUNITY
Start: 2021-07-27 | End: 2021-09-23

## 2021-10-07 VITALS
WEIGHT: 181 LBS | HEART RATE: 64 BPM | OXYGEN SATURATION: 99 % | BODY MASS INDEX: 24.52 KG/M2 | HEIGHT: 72 IN | SYSTOLIC BLOOD PRESSURE: 124 MMHG | DIASTOLIC BLOOD PRESSURE: 76 MMHG

## 2021-10-11 ENCOUNTER — HOSPITAL ENCOUNTER (OUTPATIENT)
Dept: RADIOLOGY | Facility: HOSPITAL | Age: 76
Discharge: HOME | End: 2021-10-11
Attending: INTERNAL MEDICINE
Payer: COMMERCIAL

## 2021-10-11 DIAGNOSIS — R73.9 HYPERGLYCEMIA: ICD-10-CM

## 2021-10-11 DIAGNOSIS — E78.00 HYPERCHOLESTEREMIA: ICD-10-CM

## 2021-10-11 PROCEDURE — 93971 EXTREMITY STUDY: CPT | Performed by: SURGERY

## 2021-10-11 PROCEDURE — 93971 EXTREMITY STUDY: CPT

## 2021-10-14 ENCOUNTER — OFFICE VISIT (OUTPATIENT)
Dept: INTERNAL MEDICINE CLINIC | Facility: CLINIC | Age: 76
End: 2021-10-14
Payer: COMMERCIAL

## 2021-10-14 VITALS
HEIGHT: 72 IN | TEMPERATURE: 98.4 F | WEIGHT: 180 LBS | SYSTOLIC BLOOD PRESSURE: 124 MMHG | HEART RATE: 66 BPM | DIASTOLIC BLOOD PRESSURE: 76 MMHG | BODY MASS INDEX: 24.38 KG/M2 | OXYGEN SATURATION: 98 %

## 2021-10-14 DIAGNOSIS — R60.0 EDEMA OF RIGHT LOWER LEG: ICD-10-CM

## 2021-10-14 DIAGNOSIS — E78.00 HYPERCHOLESTEREMIA: ICD-10-CM

## 2021-10-14 DIAGNOSIS — S40.021S CONTUSION OF BOTH UPPER EXTREMITIES, SEQUELA: ICD-10-CM

## 2021-10-14 DIAGNOSIS — C19 METASTATIC COLORECTAL CANCER (HCC): Primary | ICD-10-CM

## 2021-10-14 DIAGNOSIS — S40.022S CONTUSION OF BOTH UPPER EXTREMITIES, SEQUELA: ICD-10-CM

## 2021-10-14 DIAGNOSIS — E83.119 HEMOCHROMATOSIS, UNSPECIFIED HEMOCHROMATOSIS TYPE: ICD-10-CM

## 2021-10-14 PROCEDURE — 1036F TOBACCO NON-USER: CPT | Performed by: INTERNAL MEDICINE

## 2021-10-14 PROCEDURE — 3078F DIAST BP <80 MM HG: CPT | Performed by: INTERNAL MEDICINE

## 2021-10-14 PROCEDURE — 99214 OFFICE O/P EST MOD 30 MIN: CPT | Performed by: INTERNAL MEDICINE

## 2021-10-14 PROCEDURE — 3074F SYST BP LT 130 MM HG: CPT | Performed by: INTERNAL MEDICINE

## 2021-10-14 PROCEDURE — 1160F RVW MEDS BY RX/DR IN RCRD: CPT | Performed by: INTERNAL MEDICINE

## 2021-11-15 ENCOUNTER — CLINICAL SUPPORT (OUTPATIENT)
Dept: INTERNAL MEDICINE CLINIC | Facility: CLINIC | Age: 76
End: 2021-11-15
Payer: COMMERCIAL

## 2021-11-15 DIAGNOSIS — Z23 ENCOUNTER FOR IMMUNIZATION: Primary | ICD-10-CM

## 2021-11-15 PROCEDURE — G0008 ADMIN INFLUENZA VIRUS VAC: HCPCS | Performed by: INTERNAL MEDICINE

## 2021-11-15 PROCEDURE — 90662 IIV NO PRSV INCREASED AG IM: CPT | Performed by: INTERNAL MEDICINE

## 2021-12-22 LAB
ALBUMIN SERPL-MCNC: 4.2 G/DL (ref 3.7–4.7)
ALBUMIN/GLOB SERPL: 1.6 {RATIO} (ref 1.2–2.2)
ALP SERPL-CCNC: 98 IU/L (ref 44–121)
ALT SERPL-CCNC: 16 IU/L (ref 0–44)
AST SERPL-CCNC: 29 IU/L (ref 0–40)
BILIRUB SERPL-MCNC: 0.3 MG/DL (ref 0–1.2)
BUN SERPL-MCNC: 13 MG/DL (ref 8–27)
BUN/CREAT SERPL: 12 (ref 10–24)
CALCIUM SERPL-MCNC: 9.7 MG/DL (ref 8.6–10.2)
CHLORIDE SERPL-SCNC: 106 MMOL/L (ref 96–106)
CHOLEST SERPL-MCNC: 185 MG/DL (ref 100–199)
CO2 SERPL-SCNC: 26 MMOL/L (ref 20–29)
CREAT SERPL-MCNC: 1.09 MG/DL (ref 0.76–1.27)
GLOBULIN SER-MCNC: 2.6 G/DL (ref 1.5–4.5)
GLUCOSE SERPL-MCNC: 109 MG/DL (ref 65–99)
HDLC SERPL-MCNC: 66 MG/DL
LDLC SERPL CALC-MCNC: 106 MG/DL (ref 0–99)
POTASSIUM SERPL-SCNC: 4.6 MMOL/L (ref 3.5–5.2)
PROT SERPL-MCNC: 6.8 G/DL (ref 6–8.5)
SL AMB EGFR AFRICAN AMERICAN: 75 ML/MIN/1.73
SL AMB EGFR NON AFRICAN AMERICAN: 65 ML/MIN/1.73
SL AMB VLDL CHOLESTEROL CALC: 13 MG/DL (ref 5–40)
SODIUM SERPL-SCNC: 145 MMOL/L (ref 134–144)
TRIGL SERPL-MCNC: 67 MG/DL (ref 0–149)

## 2022-01-20 ENCOUNTER — OFFICE VISIT (OUTPATIENT)
Dept: INTERNAL MEDICINE CLINIC | Facility: CLINIC | Age: 77
End: 2022-01-20
Payer: COMMERCIAL

## 2022-01-20 VITALS
DIASTOLIC BLOOD PRESSURE: 74 MMHG | BODY MASS INDEX: 24.38 KG/M2 | HEIGHT: 72 IN | SYSTOLIC BLOOD PRESSURE: 114 MMHG | HEART RATE: 68 BPM | WEIGHT: 180 LBS | OXYGEN SATURATION: 99 %

## 2022-01-20 DIAGNOSIS — R26.89 BALANCE DISORDER: Primary | ICD-10-CM

## 2022-01-20 DIAGNOSIS — R29.898 WEAKNESS OF BOTH LEGS: ICD-10-CM

## 2022-01-20 DIAGNOSIS — E78.00 HYPERCHOLESTEREMIA: ICD-10-CM

## 2022-01-20 DIAGNOSIS — L97.929 IDIOPATHIC CHRONIC VENOUS HYPERTENSION OF BOTH LOWER EXTREMITIES WITH ULCER AND INFLAMMATION (HCC): ICD-10-CM

## 2022-01-20 DIAGNOSIS — C19 METASTATIC COLORECTAL CANCER (HCC): ICD-10-CM

## 2022-01-20 DIAGNOSIS — E83.110 HEREDITARY HEMOCHROMATOSIS (HCC): ICD-10-CM

## 2022-01-20 DIAGNOSIS — L97.919 IDIOPATHIC CHRONIC VENOUS HYPERTENSION OF BOTH LOWER EXTREMITIES WITH ULCER AND INFLAMMATION (HCC): ICD-10-CM

## 2022-01-20 DIAGNOSIS — I87.333 IDIOPATHIC CHRONIC VENOUS HYPERTENSION OF BOTH LOWER EXTREMITIES WITH ULCER AND INFLAMMATION (HCC): ICD-10-CM

## 2022-01-20 DIAGNOSIS — E53.8 VITAMIN B12 DEFICIENCY: ICD-10-CM

## 2022-01-20 DIAGNOSIS — G62.9 NEUROPATHY: ICD-10-CM

## 2022-01-20 PROCEDURE — 99214 OFFICE O/P EST MOD 30 MIN: CPT | Performed by: INTERNAL MEDICINE

## 2022-01-20 PROCEDURE — 1101F PT FALLS ASSESS-DOCD LE1/YR: CPT | Performed by: INTERNAL MEDICINE

## 2022-01-20 PROCEDURE — 3078F DIAST BP <80 MM HG: CPT | Performed by: INTERNAL MEDICINE

## 2022-01-20 PROCEDURE — 1160F RVW MEDS BY RX/DR IN RCRD: CPT | Performed by: INTERNAL MEDICINE

## 2022-01-20 PROCEDURE — 3074F SYST BP LT 130 MM HG: CPT | Performed by: INTERNAL MEDICINE

## 2022-01-20 PROCEDURE — 3725F SCREEN DEPRESSION PERFORMED: CPT | Performed by: INTERNAL MEDICINE

## 2022-01-20 PROCEDURE — 3288F FALL RISK ASSESSMENT DOCD: CPT | Performed by: INTERNAL MEDICINE

## 2022-01-20 PROCEDURE — 1036F TOBACCO NON-USER: CPT | Performed by: INTERNAL MEDICINE

## 2022-01-20 NOTE — ASSESSMENT & PLAN NOTE
Recommend MRI of the brain  We will hold of per your choice  Will refer to the physical therapy for formal physical therapy evaluation and training    Differential diagnosis includes probably multifactorial in part secondary to neuropathy, DJD, cannot rule out central issues

## 2022-01-20 NOTE — ASSESSMENT & PLAN NOTE
Some proximal weakness and difficulty getting off from the floor after kneeling down  Recommend to consider hip and knee x-rays  Will hold of per your choice    Will the refer to physical therapy for physical therapy for strengthening

## 2022-01-20 NOTE — ASSESSMENT & PLAN NOTE
Extremity neuropathy chronic bilateral     recommend TSH B12 at his convenience  We could do EMG nerve conduction study for formal evaluation  Probably will not change any treatment plan  Symptoms are very mild  Will hold of treatment of  Options will be to give gabapentin or Lyrica will monitor the trend      Also talked about consideration for EMG and nerve conduction study of as mention will hold of the right now

## 2022-01-20 NOTE — PROGRESS NOTES
Nohelia Kerns Office Visit Note  22     Xochitl Pepe 64 y o  male MRN: [id number]  : 1945    Assessment:     1  Balance disorder  Assessment & Plan:  Recommend MRI of the brain  We will hold of per your choice  Will refer to the physical therapy for formal physical therapy evaluation and training  Differential diagnosis includes probably multifactorial in part secondary to neuropathy, DJD, cannot rule out central issues    Orders:  -     Ambulatory Referral to Physical Therapy; Future    2  Weakness of both legs  Assessment & Plan:  Some proximal weakness and difficulty getting off from the floor after kneeling down  Recommend to consider hip and knee x-rays  Will hold of per your choice  Will the refer to physical therapy for physical therapy for strengthening    Orders:  -     Ambulatory Referral to Physical Therapy; Future    3  Idiopathic chronic venous hypertension 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 ([facility] [address] )    4  Hereditary hemochromatosis ([facility] )    5  Neuropathy  Assessment & Plan:  Extremity neuropathy chronic bilateral     recommend TSH B12 at his convenience  We could do EMG nerve conduction study for formal evaluation  Probably will not change any treatment plan  Symptoms are very mild  Will hold of treatment of  Options will be to give gabapentin or Lyrica will monitor the trend  Also talked about consideration for EMG and nerve conduction study of as mention will hold of the right now    Orders:  -     TSH, 3rd generation; Future  -     Vitamin B12; Future    6  Vitamin B12 deficiency  -     Vitamin B12; Future    7  Hypercholesteremia  Assessment & Plan:  Cholesterol is acceptable off medications will keep it of continue low-cholesterol diet      8  Metastatic colorectal cancer [facility])  Assessment & Plan:  Under care of oncologist and the cancer is doing for very well          Discussion Summary and Plan:   Today's care plan and medications were reviewed with patient in detail and all their questions answered to their satisfaction  Chief Complaint   Patient presents with    Follow-up      Subjective:  Chronic disease management     New complaint:  Balance issues:  Last 2 3 months but not all the time  And a at times feels somewhat off balance  Generally speaking no major consistent balance issues or focal weakness  He does have tingling in the feet and told to have neuropathy probably related to chemotherapy  How another complaint is that of a chronic neuropathy symptoms of both feet symptoms are very mild  Not progressing  Probably since he had chemotherapy  He never had history of thyroid issues  Will check TSH and B12 with next blood test   His alcohol consumption is very modest 7 drinks or less in a week  Also unrelated to this he has a difficulty getting up from the floor at times  Ecchymosis:  Unchanged in the forearm better in arm generally not a major issue    Hemochromatosis:  Donates blood periodically to help hemochromatosis  CBC and ferritin being followed by [facility],THE oncologist   We are calling their office to get the copy of recent lab report  He also had ultrasound of abdomen done reportedly unremarkable per patient  Will try to get the copy  Review of his lab report related to iron :  Recent lab test done by [facility],THE oncologist were reviewed:  WBC 3 1 hemoglobin 11 4 , -48 5, CA 15-3 normal 22 4, CA 19-9:  18 9 normal, CEA 4 6 normal, ferritin 423, T3, T4, TSH normal  07/16/2021:  MRI of abdomen:  1  High level of liver iron overload, with estimated iron concentration of 196 (micro)mol/g dry weight using MRQuantif software (Nl <36 (micro)mol/g)  No steatosis assessment  No splenic iron overload      2  Four liver hemangiomas as described, grossly unchanged since February 2017      3   Small fat-containing ventral abdominal wall hernia, similar to prior study  03/30/2021:  CT scan of abdomen and pelvis:  1  Multiple enhancing hepatic lesions appear mostly similar to prior MRI study  Repeat MRI as clinically indicated      2   Other findings as described are unchanged      04/07/2021:  Colonoscopy:  1  AVM noted in the distal rectum  2  Relative narrowing of the rectosigmoid area probably from previous surgery and radiation therapy  3  AVM also noted in the ascending colon  4  There is area of relative narrowing in the hepatic flexure area  This area appears benign  Biopsies taken  PSA:  PSA was done by [facility],THE oncologist   Patient will find out the results and will let us know  Colorectal cancer:  Patient remains under care of oncologist he recently had extensive test done by [facility],THE oncologist the reportedly unremarkable as well as ultrasound of abdomen done which was unremarkable  Patient denies any abdominal pain nausea vomiting constipation diarrhea hematemesis melena or bleeding  Hyperlipidemia:  He is off atorvastatin  Total cholesterol 185 HDL 66 triglyceride 67 and   Blood sugar will monitor           06/15/2021:  CBC unremarkable, CMP unremarkable, LFTs normal, lipid profile with LDL to the target, iron studies not available  11/29/2021 LDL was 93 total cholesterol 183 and HDL 68  12/21/21:  Blood sugar 109, rest of the CMP normal, , HDL 66, triglyceride 67          The following portions of the patient's history were reviewed and updated as appropriate: allergies, current medications, past family history, past medical history, past social history, past surgical history and problem list     Review of Systems   All other systems reviewed and are negative          Historical Information   Patient Active Problem List   Diagnosis    Diarrhea    Hyperglycemia    Hypertension    Leukocytosis    Metastatic colorectal cancer (HCC)    Hypercholesteremia    Bruise of both arms    Hemochromatosis    Vitamin D deficiency    Age related osteoporosis    Bleeding per rectum    Incisional hernia with obstruction but no gangrene    Left inguinal hernia    Right inguinal hernia    Other constipation    Encounter for surgical follow-up care    AV (angiodysplasia malformation of colon)    Edema of right lower leg    Balance disorder    Weakness of both legs    Idiopathic chronic venous hypertension 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 (HCC)    Neuropathy     Past Medical History:   Diagnosis Date    Cancer ([facility] [address] )     rectal    Cancer, colon ([facility] [address] )     Heart murmur     High cholesterol     HLD (hyperlipidemia)     border line    Hx of radiation therapy     Portacath in place     right chest wall     Rectal cancer [facility])      Past Surgical History:   Procedure Laterality Date    COLONOSCOPY      COLOSTOMY TAKEDOWN/REVERSE ANTON  2018    6 months after    HARTMANS PROCEDURE  2018    IR BALLOON-OCCLUDED ANTEGRADE TRANSVENOUS OBLITERATION (BATO)  5/3/2021    IR PORT REMOVAL  2021    LIVER BIOPSY      in 2017    PORTACATH PLACEMENT      in    [address] [facility] HERNIA,5+Y/O,REDUCIBL Left 2021    Procedure: REPAIR HERNIA INGUINAL WITH MESH;  Surgeon: Radha Ray MD;  [facility]: [facility];  Service: General     Social History     Substance and Sexual Activity   Alcohol Use Yes    Comment: occasionally        Social History     Substance and Sexual Activity   Drug Use Never     Social History     Tobacco Use   Smoking Status Former Smoker    Packs/day: 1 00    Years: 10 00    Pack years: 10 00    Quit date:     Years since quittin 0   Smokeless Tobacco Never Used   Tobacco Comment    quit 20 years ago     Family History   Problem Relation Age of Onset    No Known Problems Mother     No Known Problems Father      Health Maintenance Due   Topic    Hepatitis C Screening     Pneumococcal Vaccine: 65+ Years (1 of 4 - PCV13)    DTaP,Tdap,and Td Vaccines (1 - Tdap)    Medicare Annual Wellness Visit (AWV)       Meds/Allergies Current Outpatient Medications:     capecitabine (XELODA) 500 MG tablet, 500 mg 2 (two) times a day On 2 weeks off 1 week  2 tabs bid, Disp: , Rfl:     Cholecalciferol (VITAMIN D3) 3000 units TABS, Take by mouth, Disp: , Rfl:     Cyanocobalamin (VITAMIN B 12 PO), Take by mouth, Disp: , Rfl:     folic acid (FOLVITE) 1 mg tablet, Take by mouth daily, Disp: , Rfl:     Multiple Vitamins-Minerals (CENTRUM SILVER PO), Take by mouth, Disp: , Rfl:     polyethylene glycol (MIRALAX) 17 g packet, Take 17 g by mouth daily for 7 days (Patient taking differently: Take 17 g by mouth as needed ), Disp: 119 g, Rfl: 0    potassium chloride (Klor-Con) 10 mEq tablet, potassium chloride ER 10 mEq tablet,extended release, Disp: , Rfl:     denosumab (PROLIA) 60 mg/mL, Inject 60 mg under the skin every 6 (six) months  (Patient not taking: Reported on 1/20/2022 ), Disp: , Rfl:       Objective:    Vitals:   /74   Pulse 68   Ht 6' (1 829 m)   Wt 81 6 kg (180 lb)   SpO2 99%   BMI 24 41 kg/m²   Body mass index is 24 41 kg/m²  Vitals:    01/20/22 0913   Weight: 81 6 kg (180 lb)       Physical Exam  Vitals and nursing note reviewed  Constitutional:       General: He is not in acute distress  Appearance: Normal appearance  He is well-developed  He is obese  He is not ill-appearing, toxic-appearing or diaphoretic  HENT:      Head: Normocephalic and atraumatic  Right Ear: There is no impacted cerumen  Left Ear: There is no impacted cerumen  Nose: No congestion  Mouth/Throat:      Pharynx: No oropharyngeal exudate  Eyes:      General:         Right eye: No discharge  Left eye: No discharge  Conjunctiva/sclera: Conjunctivae normal    Neck:      Thyroid: No thyromegaly  Vascular: No carotid bruit or JVD  Cardiovascular:      Rate and Rhythm: Regular rhythm  Heart sounds: Normal heart sounds  Pulmonary:      Effort: No respiratory distress        Breath sounds: Normal breath sounds  No wheezing or rales  Abdominal:      General: Bowel sounds are normal  There is no distension  Palpations: There is no mass  Tenderness: There is no abdominal tenderness  There is no rebound  Musculoskeletal:      Right lower leg: Edema present  Left lower leg: No edema  Lymphadenopathy:      Cervical: No cervical adenopathy  Skin:     General: Skin is warm  Coloration: Skin is not jaundiced or pale  Findings: No rash  Comments:   Ecchymosis is present the different size mostly on exposed part of the body on both forearms   Neurological:      General: No focal deficit present  Mental Status: He is alert and oriented to person, place, and time  Mental status is at baseline  Cranial Nerves: Cranial nerves are intact  Sensory: Sensation is intact  Motor: No weakness, tremor, atrophy, abnormal muscle tone, seizure activity or pronator drift  Psychiatric:         Mood and Affect: Mood normal          Thought Content: Thought content normal          Judgment: Judgment normal          Lab Review   Orders Only on 12/21/2021   Component Date Value Ref Range Status    Glucose, Random 12/21/2021 109* 65 - 99 mg/dL Final    BUN 12/21/2021 13  8 - 27 mg/dL Final    Creatinine 12/21/2021 1 09  0 76 - 1 27 mg/dL Final    eGFR Non  12/21/2021 65  >59 mL/min/1 73 Final    eGFR  12/21/2021 75  >59 mL/min/1 73 Final    Comment: **In accordance with recommendations from the NKF-ASN Task force,**    [facility] is in the process of updating its eGFR calculation to the    2021 CKD-EPI creatinine equation that estimates kidney function    without a race variable        SL AMB BUN/CREATININE RATIO 12/21/2021 12  10 - 24 Final    Sodium 12/21/2021 145* 134 - 144 mmol/L Final    Potassium 12/21/2021 4 6  3 5 - 5 2 mmol/L Final    Chloride 12/21/2021 106  96 - 106 mmol/L Final    CO2 12/21/2021 26  20 - 29 mmol/L Final    CALCIUM 12/21/2021 9 7  8 6 - 10 2 mg/dL Final    Protein, Total 12/21/2021 6 8  6 0 - 8 5 g/dL Final    Albumin 12/21/2021 4 2  3 7 - 4 7 g/dL Final    Globulin, Total 12/21/2021 2 6  1 5 - 4 5 g/dL Final    Albumin/Globulin Ratio 12/21/2021 1 6  1 2 - 2 2 Final    TOTAL BILIRUBIN 12/21/2021 0 3  0 0 - 1 2 mg/dL Final    Alk Phos Isoenzymes 12/21/2021 98  44 - 121 IU/L Final                  **Please note reference interval change**    AST 12/21/2021 29  0 - 40 IU/L Final    ALT 12/21/2021 16  0 - 44 IU/L Final    Cholesterol, Total 12/21/2021 185  100 - 199 mg/dL Final    Triglycerides 12/21/2021 67  0 - 149 mg/dL Final    HDL 12/21/2021 66  >39 mg/dL Final    VLDL Cholesterol Calculated 12/21/2021 13  5 - 40 mg/dL Final    LDL Calculated 12/21/2021 106* 0 - 99 mg/dL Final         Patient Instructions   Follow with Consultants as per their and our suggestion    Follow up in 12 week(s) or as needed earlier    Follow all instructions as advised and discussed  Take your medications as prescribed  Call the office immediately if you experience any side effects  Ask questions if you do not understand  Keep your scheduled appointment as advised or come sooner if necessary or in doubt  Best time to call for non-urgent matter or questions on weekdays is between 9am and 12 noon  See physician for any new symptoms or worsening of current symptoms  Urgent or emergent situations call 911 and report to nearest emergency room  I spent  30 -40 minutes taking care of this patient including clinical care, conseling, collaboration, chart, lab and consultaion review as appropriate    Patient is to get labs 1 week(s) prior to next visit if advised               Dr Doroteo Mullen MD  [facility]       "This note has been constructed using a voice recognition system  Therefore there may be syntax, spelling, and/or grammatical errors   Please call if you have any questions  "

## 2022-01-26 ENCOUNTER — EVALUATION (OUTPATIENT)
Dept: PHYSICAL THERAPY | Facility: CLINIC | Age: 77
End: 2022-01-26
Payer: COMMERCIAL

## 2022-01-26 DIAGNOSIS — L97.929 IDIOPATHIC CHRONIC VENOUS HYPERTENSION OF BOTH LOWER EXTREMITIES WITH ULCER AND INFLAMMATION (HCC): ICD-10-CM

## 2022-01-26 DIAGNOSIS — R29.898 WEAKNESS OF BOTH LEGS: ICD-10-CM

## 2022-01-26 DIAGNOSIS — L97.919 IDIOPATHIC CHRONIC VENOUS HYPERTENSION OF BOTH LOWER EXTREMITIES WITH ULCER AND INFLAMMATION (HCC): ICD-10-CM

## 2022-01-26 DIAGNOSIS — R26.89 BALANCE DISORDER: Primary | ICD-10-CM

## 2022-01-26 DIAGNOSIS — I87.333 IDIOPATHIC CHRONIC VENOUS HYPERTENSION OF BOTH LOWER EXTREMITIES WITH ULCER AND INFLAMMATION (HCC): ICD-10-CM

## 2022-01-26 PROCEDURE — 97162 PT EVAL MOD COMPLEX 30 MIN: CPT

## 2022-01-26 NOTE — PROGRESS NOTES
PT Evaluation          Insurance:  AMA/CMS Eval/ Re-eval POC expires Fatimah Duran #/ Referral # Total  Start date  Expiration date Extension  Visit limitation? PT only or  PT+OT? Co-Insurance   Self-Pay 2022 NA NA NA NA NA NA PT No (self-pay)                                                                    Today's date: 2022  Patient name: Anjum Castillo  : 1945  MRN: [id number]  Referring provider: Valeri Fontenot,*  Dx:   Encounter Diagnosis     ICD-10-CM    1  Balance disorder  R26 [id number] Ambulatory Referral to Physical Therapy   2  Weakness of both legs  R29 [id number] Ambulatory Referral to Physical Therapy         Assessment  Assessment details: Patient is a 64year old male who is presenting to skilled OPPT for IE with reports of generalized LE weakness and decreased balance that has in turn limited safe completion of IADLs and functional mobility at home and in the community  PMH significant for: metastatic colorectal cancer, neuropathy, and RLE edema  Strength screen per MMT within functional limits, with mild weakness noted in RLE > LLE (most notably hip flexion, abduction, and knee extension)  Sensation and coordination screen without significant findings  Slightly diminished proprioception at 1st MTP joint B/L  Patient performed below age-matched normative values for the following outcome measures: 5 x STS, FGA, DGI, and 6 MWT, suggesting likely deficits in functional LE strength, dynamic balance, and cardiovascular endurance, respectively  TUG, 10 MWT, and mCTSIB all noted to be within normative values  Per cutoff scores for the 5 x STS, DGI, and FGA he is considered HIGH fall risk  All normative values and cutoff scores taken from the APTA Neuro Section and Rehab Measures  He ambulated without an AD for length of session with following deficits noted: mildly diminished heel strike/toe off   Patient demonstrated greatest difficulty with walking with HT, backwards ambulation, and ambulation with EC suggesting increased reliance on the visual system for dynamic balance  Consider potential referral to lymphedema therapy for RLE edema management  Patient will benefit from skilled OPPT services to address deficits in strength and higher level balance in order to maximize functional mobility and reduce his overall risk for falls  Impairments: Impaired balance, Impaired physical strength and Lacks appropriate HEP  Understanding of Dx/Px/POC: Excellent  Prognosis: Excellent    Patient verbalized understanding of POC  Please contact me if you have any questions or recommendations  Thank you for the referral and the opportunity to share in [address] [place] [address] care          Plan  Plan details: balance/strength training, TENS/vibration plate for neuropathy    Patient would benefit from: PT Eval and Skilled PT  Planned modality interventions: Biofeedback, Cryotherapy, TENS and Thermotherapy: Hydrocollator Packs  Planned therapy interventions: Balance, HEP, Neuromuscular re-education, Patient education, Strengthening, Stretching, Therapeutic activities and Therapeutic exercises  Frequency: 2x/wk  Duration in weeks: 12  Plan of Care beginning date: 1/26/2022  Plan of Care expiration date: 12 weeks - 4/20/2022  Treatment plan discussed with: Patient       Goals  Short Term Goals (4 weeks):    - Patient will be independent in basic HEP 2-3 weeks  - Patient will improve 5xSTS score by 2 3 seconds from 17 43 seconds to 15 13 seconds to promote improved LE functional strength needed for ADLs    Long Term Goals (12 weeks):  - Patient will be independent in a comprehensive home exercise program  - Patient will improve scoring on DGI by 2 6 points from 19/24 to 19/24 to progress safety  - Patient will improve scoring on FGA by 4 points from 19/30 to 23/30 to progress safety with dynamic tasks  - Patient will be able to demonstrate HT in gait without veering  - Patient will improve 6 Minute Walk Test score by 190 feet from 1375 feet to 1565 feet to promote improved cardiovascular endurance  - Patient will report 50% reduction in near falls in order to improve safety with functional tasks and reduce his risk for falls  - Patient will report going on walks at least 3 days per week to promote independence and improved cardiovascular endurance  - Patient will be able to ascend/descend stairs reciprocally with 1 UE assist to promote independence and safety with ADLs  - Patient will report 50% reduction in near falls when ambulating on uneven terrain      Cut off score    All date taken from APTA Neuro Section or Rehab Measures      Fontaine/62  MDC: 6 pts  Age Norms:  61-76: M - 54   F - 55  70-79: M - 47   F - 53  80-89: M - 48   F - 50 5xSTS: Grace et al 2010  MDC: 2 3 sec  Age Norms:  60-69: 11 1 sec  70-79: 12 6 sec  80-89: 14 8 sec   TUG  MDC: 4 14 sec  Cut off score:  >13 5 sec community dwelling adults  >32 2 frail elderly  <20 I for basic transfers  >30 dependent on transfers 10 Meter Walk Test: Rosanna padilla 2011  MDC: 0 18 m/s  20-29: M - 1 35 m   F - 1 34 m  30-39: M - 1 43 m   F - 1 34 m  40-49: M - 1 43 m   F - 1 39 m  50-59: M - 1 43 m   F - 1 31 m  60-69: M - 1 34 m   F - 1 24 m  70-79: M - 1 26 m   F - 1 13 m  80-89: M - 0 97 m   F - 0 94 m    Household Ambulator < 0 4 m/s  Limited Community Ambulator 0 4 - 0 8 m/s  [employer] Ambulator 0 8 - 1 2 m/s  Safely cross the street > 1 2 m/s   FGA  MCID: 4 pts  Geriatrics/community < 22/30 fall risk  Geriatrics/community < 20/30 unexplained falls    DGI  MDC: vestibular - 4 pts  MDC: geriatric/community - 3 pts  Falls risk <19/24 mCTSIB  Norm: 20-60 yrs  Eyes open firm: norm sway 0 21-0 48  Eyes closed firm: norm sway 0 48-0 99  Eyes open foam: norm sway 0 38-0 71  Eyes closed foam: norm sway 0 70-2 22   6 Minute Walk Test  MDC: 190 98 ft  MCID: 164 ft    Age Norms  60-69: M - 1876 ft (571 80 m)  F - 1765 ft (537 98 m)  70-79: M - 1729 ft (527 00 m)  F - 1545 ft (470 92 m)  80-89: M - 1368 ft (416 97 m)  F - 1286 ft (391 97 m) ABC: May William, 2003  <67% increased risk for falls         Subjective    History of Present Illness  - Mechanism of injury: Patient is a 64year old male who is presenting to skilled OPPT for IE with reports of generalized LE weakness and decreased balance  Patient reports RLE edema and B/L neuropathy that he feels has contributed to overall LE weakness  Additionally, he notes he underwent cancer treatment in 2017 that resulted in a prolonged hospitalization and concurrent rehab stay that resulted in generalized weakness and balance deficits that he feels he never fully recovered from  He is still undergoing medical treatment via oral medication for colorectal cancer, which he believes to be contributing to the neuropathy in his hands and feet         - Primary AD: none  - Assist level at home: independent  - Decreased fine motor tasks: No       Pain  - Current pain rating: NA/10    Social Support  - Steps to enter house: 7 ANA ROSA  - Stairs in house: 14 steps  - Lives in: 2 story house  - Lives with: wife    - Employment status: retired  - Hand dominance: R    Treatments  - Previous treatment: none  - Current treatment: none  - Diagnostic Testing: none      Objective     LE MMT  - R Hip Flexion: 4/5   L Hip Flexion: 4+/5  - R Hip Extension: 4/5   L Hip Extension: 4+/5  - R Hip Abduction: 4/5   L Hip Abduction: 4+/5  - R Hip Adduction: 4/5   L Hip Adduction: 4+/5  - R Knee Extension: 4-/5  L Knee Extension: 4/5  - R Knee Flexion: 4/5   L Knee Flexion: 4/5  - R Ankle DF: 4-/5   L Ankle DF: 4-/5  - R Ankle PF: 4-/5   L Ankle PF: 4-/5    Sensation  - Light touch: intact  - Deep pressure: intact  - Temperature: per patient's subjective reports, sensitive to temperature  - Proprioception: slightly diminished B/L (tested on 1st MTP joint)    Coordination  - Heel to Shin: normal  - Alternate Toe Taps: normal  - Finger to Nose: normal  - Finger to Finger: NT    Myelopathy Screen (>3/5 +)  - Teixeira's Reflex: NT  - Babinski Reflex: NT  - Inverted Supinator Sign: NT  - Age > 45: Yes  - Gait Deviation: No      Gait  - Abnormalities: mildly diminished heel strike/toe off           Outcome Measures Initial Eval  1/26/2022        5xSTS 17 43 sec        TUG 9 21 sec        10 meter 1 25 m/s        FGA 19/30        DGI 19/24        mCTSIB  - FTEO (firm)  - FTEC (firm)  - FTEO (foam)  - FTEC (foam)   30 sec (+)  30 sec (+)  30 sec (+)  30 sec (+)        6MWT 1375 ft                                     Precautions: colorectal cancer, neuropathy   Past Medical History:   Diagnosis Date    Cancer ([facility] [address] )     rectal    Cancer, colon (HCC)     Heart murmur     High cholesterol     HLD (hyperlipidemia)     border line    Hx of radiation therapy     Portacath in place     right chest wall     Rectal cancer (HCC)

## 2022-02-01 ENCOUNTER — OFFICE VISIT (OUTPATIENT)
Dept: PHYSICAL THERAPY | Facility: CLINIC | Age: 77
End: 2022-02-01
Payer: COMMERCIAL

## 2022-02-01 DIAGNOSIS — I87.333 IDIOPATHIC CHRONIC VENOUS HYPERTENSION OF BOTH LOWER EXTREMITIES WITH ULCER AND INFLAMMATION (HCC): ICD-10-CM

## 2022-02-01 DIAGNOSIS — R26.89 BALANCE DISORDER: Primary | ICD-10-CM

## 2022-02-01 DIAGNOSIS — R29.898 WEAKNESS OF BOTH LEGS: ICD-10-CM

## 2022-02-01 DIAGNOSIS — L97.929 IDIOPATHIC CHRONIC VENOUS HYPERTENSION OF BOTH LOWER EXTREMITIES WITH ULCER AND INFLAMMATION (HCC): ICD-10-CM

## 2022-02-01 DIAGNOSIS — L97.919 IDIOPATHIC CHRONIC VENOUS HYPERTENSION OF BOTH LOWER EXTREMITIES WITH ULCER AND INFLAMMATION (HCC): ICD-10-CM

## 2022-02-01 PROCEDURE — 97112 NEUROMUSCULAR REEDUCATION: CPT

## 2022-02-01 NOTE — PROGRESS NOTES
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 (HCC)  I87 333     L97 919     L97 929                   Subjective: Patient reports to session today with no new issues or complaints  Objective: See treatment diary below    NMR: (2# ankle weights donned at start of session)  - STS from standard chair + 5# med ball hold 2 x 10  - Standing hip 3-way + 2# ankle weights x 20 each; 3 sec iso hold  - Tandem walking in // bars 4 cycles x 10 ft/down back  - Fwd/lat step ups to foam pad x 10 each  - Fwd/bwd stepping over 9" juan x 10 each  - Dynamic gait drills   Backwards ambulation 2 x 40 ft   Ambulation w/ EC 2 x 40 ft  - Step ups river rocks (small, medium) x 10 each leg    TA  - Updated HEP (22): Access Code: [email or web address]  URL: [email or web address]  Date: 2022  Prepared by: Neha Mace    Exercises  · Standing March with Counter Support - 1 x daily - 7 x weekly - 2 sets - 10 reps - 3 sec hold  · Standing Hip Abduction with Counter Support - 1 x daily - 7 x weekly - 2 sets - 10 reps - 3 sec hold  · Standing Hip Extension with Counter Support - 1 x daily - 7 x weekly - 2 sets - 10 reps - 3 sec hold  · Tandem Walking with Counter Support - 1 x daily - 7 x weekly  · Backward Walking with Counter Support - 1 x daily - 7 x weekly      Assessment: Patient tolerated treatment session well today with initiation of dynamic balance exercise program and establishing simple HEP  Patient demonstrated good technique with dynamic balance activities, plan to progress next session to further challenge patient (I e  with posterior resistance)   Required frequent verbal cueing for standing hip 3 way to maintain isometric hold as well as instruction on proper mechanics  Patient will continue to benefit from skilled OPPT services to address deficits in dynamic balance and strength in order to maximize functional mobility and reduce overall risk for falls  Plan: Continue per plan of care  Progress treatment as tolerated  Insurance:  AMA/CMS Eval/ Re-eval POC expires Desiree Erps #/ Referral # Total  Start date  Expiration date Extension  Visit limitation? PT only or  PT+OT?  Co-Insurance   Self-Pay 1/26 4/20/2022 1/26 NA NA NA NA NA NA PT No (self-pay)                                                               Outcome Measures Initial Eval  1/26/2022        5xSTS 17 43 sec        TUG 9 21 sec        10 meter 1 25 m/s        FGA 19/30        DGI 19/24        mCTSIB  - FTEO (firm)  - FTEC (firm)  - FTEO (foam)  - FTEC (foam)   30 sec (+)  30 sec (+)  30 sec (+)  30 sec (+)        6MWT 1375 ft

## 2022-02-07 ENCOUNTER — OFFICE VISIT (OUTPATIENT)
Dept: PHYSICAL THERAPY | Facility: CLINIC | Age: 77
End: 2022-02-07
Payer: COMMERCIAL

## 2022-02-07 DIAGNOSIS — L97.919 IDIOPATHIC CHRONIC VENOUS HYPERTENSION OF BOTH LOWER EXTREMITIES WITH ULCER AND INFLAMMATION (HCC): ICD-10-CM

## 2022-02-07 DIAGNOSIS — I87.333 IDIOPATHIC CHRONIC VENOUS HYPERTENSION OF BOTH LOWER EXTREMITIES WITH ULCER AND INFLAMMATION (HCC): ICD-10-CM

## 2022-02-07 DIAGNOSIS — R26.89 BALANCE DISORDER: Primary | ICD-10-CM

## 2022-02-07 DIAGNOSIS — L97.929 IDIOPATHIC CHRONIC VENOUS HYPERTENSION OF BOTH LOWER EXTREMITIES WITH ULCER AND INFLAMMATION (HCC): ICD-10-CM

## 2022-02-07 DIAGNOSIS — R29.898 WEAKNESS OF BOTH LEGS: ICD-10-CM

## 2022-02-07 PROCEDURE — 97112 NEUROMUSCULAR REEDUCATION: CPT

## 2022-02-07 NOTE — PROGRESS NOTES
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 (HCC)  I87 333     L97 919     L97 929                   Subjective: Patient reports to session today with no new issues or complaints  Objective: See treatment diary below    NMR: (2# ankle weights donned at start of session)  - STS from standard chair + 5# med ball hold (one airex pad underneath feet) 2 x 10  - Tandem walking in // bars 4 cycles x 10 ft/down back  - Sidestepping on foam beam w/ intermittent hurdles (9") x 4 cycles down/back  - Tandem ambulation on foam beam w/ intermittent hurdles (6") x 4 cycles down/back  - Step ups to BOSU x 15 each leg  - Lateral lunges to BOSU x 10 each leg  - Fwd/bwd stepping from one foam pad to another w/ 6" juan in between x 10  - Lateral stepping from one foam pad to another w/ 6" juan in between x 10  - Fwd juan navigation (9") w/ river rocks in between x 4 cycles down/back  - Dynamic gait drills   Backwards ambulation 2 x 40 ft   Ambulation w/ EC 2 x 40 ft      Assessment: Patient tolerated treatment session well today with continued progression of balance exercises  Patient demonstrated frequent posterior LOB on compliant surfaces  Plan to integrate exercises with posterior resistance in future sessions to further develop posterior balance reaction  Continue to challenge patient with various compliant and uneven surfaces  Patient will continue to benefit from skilled OPPT services to address deficits in dynamic balance and strength in order to maximize functional mobility and reduce overall risk for falls  Plan: Continue per plan of care  Progress treatment as tolerated         Insurance:  AMA/CMS Eval/ Re-eval POC expires Ailynangélica Tirado #/ Referral # Total  Start date  Expiration date Extension  Visit limitation? PT only or  PT+OT?  Co-Insurance   Self-Pay 1/26 4/20/2022 1/26 NA NA NA NA NA NA PT No (self-pay)                                                               Outcome Measures Initial Eval  1/26/2022        5xSTS 17 43 sec        TUG 9 21 sec        10 meter 1 25 m/s        FGA 19/30        DGI 19/24        mCTSIB  - FTEO (firm)  - FTEC (firm)  - FTEO (foam)  - FTEC (foam)   30 sec (+)  30 sec (+)  30 sec (+)  30 sec (+)        6MWT 1375 ft

## 2022-02-10 ENCOUNTER — OFFICE VISIT (OUTPATIENT)
Dept: PHYSICAL THERAPY | Facility: CLINIC | Age: 77
End: 2022-02-10
Payer: COMMERCIAL

## 2022-02-10 DIAGNOSIS — R26.89 BALANCE DISORDER: Primary | ICD-10-CM

## 2022-02-10 DIAGNOSIS — R29.898 WEAKNESS OF BOTH LEGS: ICD-10-CM

## 2022-02-10 DIAGNOSIS — L97.929 IDIOPATHIC CHRONIC VENOUS HYPERTENSION OF BOTH LOWER EXTREMITIES WITH ULCER AND INFLAMMATION (HCC): ICD-10-CM

## 2022-02-10 DIAGNOSIS — I87.333 IDIOPATHIC CHRONIC VENOUS HYPERTENSION OF BOTH LOWER EXTREMITIES WITH ULCER AND INFLAMMATION (HCC): ICD-10-CM

## 2022-02-10 DIAGNOSIS — L97.919 IDIOPATHIC CHRONIC VENOUS HYPERTENSION OF BOTH LOWER EXTREMITIES WITH ULCER AND INFLAMMATION (HCC): ICD-10-CM

## 2022-02-10 PROCEDURE — 97112 NEUROMUSCULAR REEDUCATION: CPT

## 2022-02-10 NOTE — PROGRESS NOTES
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 (HCC)  I87 333     L97 919     L97 929                   Subjective: Patient reports to session today with no new issues or complaints  Objective: See treatment diary below    NMR: (2# ankle weights donned at start of session)  - STS from standard chair + 5# med ball hold (one airex pad underneath feet) 15 reps, 7 reps  - Sidestepping on foam beam w/ intermittent hurdles (9") x 4 cycles down/back  - Tandem ambulation on foam beam w/ intermittent hurdles (9") x 4 cycles down/back  - Step ups to BOSU x 15 each leg  - Ambulation w/ posterior resistance (red) 4 x 50 ft  - Fwd/lat step ups w/ posterior resistance x 15 each  - Cone weaving (3 cones, 10 ft) w posterior resistance (red)  - Fwd juan navigation (9") w/ river rocks in between x 4 cycles down/back  - Dynamic gait drills   Backwards ambulation 2 x 40 ft   Ambulation w/ EC 2 x 40 ft      Assessment: Patient tolerated treatment session well today with continued progression of balance exercises  Patient challenged most significantly with exercises that incorporated posterior resistance  Patient demonstrated occasional stepping strategy during ambulation with posterior resistance  Patient had one LOB when performing river rocks negotiation over hurdles, able to recover independently with use of grab bars  He will continue to benefit from skilled OPPT services to address deficits in dynamic balance and strength in order to maximize functional mobility and reduce overall risk for falls  Plan: Continue per plan of care  Progress treatment as tolerated         Insurance:  AMA/CMS Eval/ Re-eval POC expires Georgina Red #/ Referral # Total  Start date  Expiration date Extension  Visit limitation? PT only or  PT+OT?  Co-Insurance   Self-Pay 1/26 4/20/2022 1/26 NA NA NA NA NA NA PT No (self-pay)                                                               Outcome Measures Initial Eval  1/26/2022        5xSTS 17 43 sec        TUG 9 21 sec        10 meter 1 25 m/s        FGA 19/30        DGI 19/24        mCTSIB  - FTEO (firm)  - FTEC (firm)  - FTEO (foam)  - FTEC (foam)   30 sec (+)  30 sec (+)  30 sec (+)  30 sec (+)        6MWT 1375 ft

## 2022-02-14 ENCOUNTER — APPOINTMENT (OUTPATIENT)
Dept: PHYSICAL THERAPY | Facility: CLINIC | Age: 77
End: 2022-02-14
Payer: COMMERCIAL

## 2022-02-17 ENCOUNTER — OFFICE VISIT (OUTPATIENT)
Dept: PHYSICAL THERAPY | Facility: CLINIC | Age: 77
End: 2022-02-17
Payer: COMMERCIAL

## 2022-02-17 DIAGNOSIS — R26.89 BALANCE DISORDER: Primary | ICD-10-CM

## 2022-02-17 DIAGNOSIS — R29.898 WEAKNESS OF BOTH LEGS: ICD-10-CM

## 2022-02-17 DIAGNOSIS — L97.929 IDIOPATHIC CHRONIC VENOUS HYPERTENSION OF BOTH LOWER EXTREMITIES WITH ULCER AND INFLAMMATION (HCC): ICD-10-CM

## 2022-02-17 DIAGNOSIS — I87.333 IDIOPATHIC CHRONIC VENOUS HYPERTENSION OF BOTH LOWER EXTREMITIES WITH ULCER AND INFLAMMATION (HCC): ICD-10-CM

## 2022-02-17 DIAGNOSIS — L97.919 IDIOPATHIC CHRONIC VENOUS HYPERTENSION OF BOTH LOWER EXTREMITIES WITH ULCER AND INFLAMMATION (HCC): ICD-10-CM

## 2022-02-17 PROCEDURE — 97112 NEUROMUSCULAR REEDUCATION: CPT

## 2022-02-17 NOTE — PROGRESS NOTES
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 (HCC)  I87 333     L97 919     L97 929                   Subjective: Patient reports to session today with no new issues or complaints  Objective: See treatment diary below    NMR: (2# ankle weights donned at start of session)  - STS from standard chair + 5# med ball hold (one airex pad underneath feet) 15 reps, 5 reps  - Step ups to BOSU x 20 each leg  - Standing balance on flat side of BOSU 3 x 1 minutes  - AP sway on foam x 30  - Fwd juan navigation (9") w/ river rocks in between x 4 cycles down/back  - Fwd ambulation w/ posterior resistance (red) 4 x 50 ft  - Bwd ambulation w/ anterior resistance (red) 4 x 50 ft  - Cone weaving (3 cones, 10 ft) w posterior resistance (red) x 5 trials    Assessment: Patient tolerated treatment session well today with continued progression of balance exercises  Patient continues to demonstrate frequent LOS posteriorly on compliant and uneven surfaces  Incorporated AP sway on foam to address poor posterior reactive balance  Occasional retropulsion noted with cone weaving activity, required Ezequiel from therapist to recover  He will continue to benefit from skilled OPPT services to address deficits in dynamic balance and strength in order to maximize functional mobility and reduce overall risk for falls  Plan: Continue per plan of care  Progress treatment as tolerated  Insurance:  AMA/CMS Eval/ Re-eval POC expires Manuel Brito #/ Referral # Total  Start date  Expiration date Extension  Visit limitation? PT only or  PT+OT?  Co-Insurance   Self-Pay 2022 NA NA NA NA NA NA PT No (self-pay) Outcome Measures Initial Eval  1/26/2022        5xSTS 17 43 sec        TUG 9 21 sec        10 meter 1 25 m/s        FGA 19/30        DGI 19/24        mCTSIB  - FTEO (firm)  - FTEC (firm)  - FTEO (foam)  - FTEC (foam)   30 sec (+)  30 sec (+)  30 sec (+)  30 sec (+)        6MWT 1375 ft

## 2022-02-21 ENCOUNTER — APPOINTMENT (OUTPATIENT)
Dept: PHYSICAL THERAPY | Facility: CLINIC | Age: 77
End: 2022-02-21
Payer: COMMERCIAL

## 2022-02-24 ENCOUNTER — APPOINTMENT (OUTPATIENT)
Dept: PHYSICAL THERAPY | Facility: CLINIC | Age: 77
End: 2022-02-24
Payer: COMMERCIAL

## 2022-02-28 ENCOUNTER — OFFICE VISIT (OUTPATIENT)
Dept: PHYSICAL THERAPY | Facility: CLINIC | Age: 77
End: 2022-02-28
Payer: COMMERCIAL

## 2022-02-28 DIAGNOSIS — R29.898 WEAKNESS OF BOTH LEGS: ICD-10-CM

## 2022-02-28 DIAGNOSIS — R26.89 BALANCE DISORDER: Primary | ICD-10-CM

## 2022-02-28 DIAGNOSIS — R26.89 IMBALANCE: ICD-10-CM

## 2022-02-28 PROCEDURE — 97112 NEUROMUSCULAR REEDUCATION: CPT

## 2022-02-28 NOTE — PROGRESS NOTES
Daily Note     Today's date: 2022  Patient name: Taylor Monroe  : 1945  MRN: [id number]  Referring provider: Jackie Millan,*  Dx:   Encounter Diagnosis     ICD-10-CM    1  Balance disorder  R26 89    2  Weakness of both legs  R29 898    3  Imbalance  R26 89                   Subjective: Patient seen in PT  Denies falls or pain  Agreeable to PT  Objective: See treatment diary below  PT verbal and tactile cues for technique and progression  NMR: (2# ankle weights donned at start of session)  - STS from standard chair + 5# med ball hold (one airex pad underneath feet) 15 reps, 10 reps  - Floor to stand transfers in half high kneel pushing up with UE from leg, 3x R, 2x L  - Step ups to BOSU x 20 each leg  - Standing balance on flat side of BOSU 2 x 1 minutes w/ mental task  - AP sway on foam x 30 EO- NO LOB, EC x 30 reps 5 LOB  - Fwd juan navigation (9") w/ river rocks in between x 4 cycles down/back  - Fwd ambulation w/ posterior resistance (red) 2x100 ft  - Bwd ambulation w/ anterior resistance (red) 4 x 50 ft  - Cone weaving (5 cones, 12 ft) w perturbations x 5 trials    Assessment: Patient tolerated treatment session well today with continued progression of balance exercises  Visually dependent for balance, noted some power issues with floor to stand transfers- power being addressed during strengtheing ex's by increasing speed of movement  Educated patient on pushing from knee with floor to stand with noted improvement in performance  Tolerated session well, good reactive balance strategies with perturbation  He will continue to benefit from skilled OPPT services to address deficits in dynamic balance and strength in order to maximize functional mobility and reduce overall risk for falls  Plan: Continue per plan of care  Progress treatment as tolerated         Insurance:  AMA/CMS Eval/ Re-eval POC expires Mo Logan #/ Referral # Total  Start date  Expiration date Extension Visit limitation? PT only or  PT+OT?  Co-Insurance   Self-Pay 1/26 4/20/2022 1/26 NA NA NA NA NA NA PT No (self-pay)                                                               Outcome Measures Initial Eval  1/26/2022        5xSTS 17 43 sec        TUG 9 21 sec        10 meter 1 25 m/s        FGA 19/30        DGI 19/24        mCTSIB  - FTEO (firm)  - FTEC (firm)  - FTEO (foam)  - FTEC (foam)   30 sec (+)  30 sec (+)  30 sec (+)  30 sec (+)        6MWT 1375 ft

## 2022-03-07 ENCOUNTER — EVALUATION (OUTPATIENT)
Dept: PHYSICAL THERAPY | Facility: CLINIC | Age: 77
End: 2022-03-07
Payer: COMMERCIAL

## 2022-03-07 ENCOUNTER — OFFICE VISIT (OUTPATIENT)
Dept: INTERNAL MEDICINE CLINIC | Facility: CLINIC | Age: 77
End: 2022-03-07
Payer: COMMERCIAL

## 2022-03-07 VITALS
DIASTOLIC BLOOD PRESSURE: 70 MMHG | BODY MASS INDEX: 24.38 KG/M2 | OXYGEN SATURATION: 99 % | HEART RATE: 75 BPM | SYSTOLIC BLOOD PRESSURE: 132 MMHG | TEMPERATURE: 98.2 F | WEIGHT: 180 LBS | HEIGHT: 72 IN

## 2022-03-07 DIAGNOSIS — E78.00 HYPERCHOLESTEREMIA: ICD-10-CM

## 2022-03-07 DIAGNOSIS — R26.89 IMBALANCE: ICD-10-CM

## 2022-03-07 DIAGNOSIS — Z00.00 MEDICARE ANNUAL WELLNESS VISIT, SUBSEQUENT: ICD-10-CM

## 2022-03-07 DIAGNOSIS — L97.929 IDIOPATHIC CHRONIC VENOUS HYPERTENSION OF BOTH LOWER EXTREMITIES WITH ULCER AND INFLAMMATION (HCC): ICD-10-CM

## 2022-03-07 DIAGNOSIS — H11.003 PTERYGIUM OF BOTH EYES: ICD-10-CM

## 2022-03-07 DIAGNOSIS — R29.898 WEAKNESS OF BOTH LEGS: Primary | ICD-10-CM

## 2022-03-07 DIAGNOSIS — R60.0 EDEMA OF RIGHT LOWER LEG: ICD-10-CM

## 2022-03-07 DIAGNOSIS — K62.5 BLEEDING PER RECTUM: ICD-10-CM

## 2022-03-07 DIAGNOSIS — I87.333 IDIOPATHIC CHRONIC VENOUS HYPERTENSION OF BOTH LOWER EXTREMITIES WITH ULCER AND INFLAMMATION (HCC): ICD-10-CM

## 2022-03-07 DIAGNOSIS — D70.1 CHEMOTHERAPY-INDUCED NEUTROPENIA (HCC): ICD-10-CM

## 2022-03-07 DIAGNOSIS — L97.919 IDIOPATHIC CHRONIC VENOUS HYPERTENSION OF BOTH LOWER EXTREMITIES WITH ULCER AND INFLAMMATION (HCC): ICD-10-CM

## 2022-03-07 DIAGNOSIS — G62.9 NEUROPATHY: ICD-10-CM

## 2022-03-07 DIAGNOSIS — T45.1X5A CHEMOTHERAPY-INDUCED NEUTROPENIA (HCC): ICD-10-CM

## 2022-03-07 DIAGNOSIS — R73.9 HYPERGLYCEMIA: ICD-10-CM

## 2022-03-07 DIAGNOSIS — E83.119 HEMOCHROMATOSIS, UNSPECIFIED HEMOCHROMATOSIS TYPE: ICD-10-CM

## 2022-03-07 DIAGNOSIS — K59.09 OTHER CONSTIPATION: ICD-10-CM

## 2022-03-07 DIAGNOSIS — R29.898 WEAKNESS OF BOTH LEGS: ICD-10-CM

## 2022-03-07 DIAGNOSIS — K40.90 LEFT INGUINAL HERNIA: ICD-10-CM

## 2022-03-07 DIAGNOSIS — C19 METASTATIC COLORECTAL CANCER (HCC): ICD-10-CM

## 2022-03-07 DIAGNOSIS — R26.89 BALANCE DISORDER: Primary | ICD-10-CM

## 2022-03-07 DIAGNOSIS — H40.9 GLAUCOMA OF BOTH EYES, UNSPECIFIED GLAUCOMA TYPE: ICD-10-CM

## 2022-03-07 DIAGNOSIS — R26.89 BALANCE DISORDER: ICD-10-CM

## 2022-03-07 DIAGNOSIS — E55.9 VITAMIN D DEFICIENCY: ICD-10-CM

## 2022-03-07 PROBLEM — H11.053 PROGRESSIVE PERIPHERAL PTERYGIUM OF BOTH EYES: Status: ACTIVE | Noted: 2022-02-25

## 2022-03-07 PROBLEM — M85.80 OSTEOPENIA: Status: ACTIVE | Noted: 2020-12-20

## 2022-03-07 PROCEDURE — 1125F AMNT PAIN NOTED PAIN PRSNT: CPT | Performed by: INTERNAL MEDICINE

## 2022-03-07 PROCEDURE — G0439 PPPS, SUBSEQ VISIT: HCPCS | Performed by: INTERNAL MEDICINE

## 2022-03-07 PROCEDURE — 1036F TOBACCO NON-USER: CPT | Performed by: INTERNAL MEDICINE

## 2022-03-07 PROCEDURE — 99213 OFFICE O/P EST LOW 20 MIN: CPT | Performed by: INTERNAL MEDICINE

## 2022-03-07 PROCEDURE — 3075F SYST BP GE 130 - 139MM HG: CPT | Performed by: INTERNAL MEDICINE

## 2022-03-07 PROCEDURE — 3288F FALL RISK ASSESSMENT DOCD: CPT | Performed by: INTERNAL MEDICINE

## 2022-03-07 PROCEDURE — 3078F DIAST BP <80 MM HG: CPT | Performed by: INTERNAL MEDICINE

## 2022-03-07 PROCEDURE — 97530 THERAPEUTIC ACTIVITIES: CPT

## 2022-03-07 PROCEDURE — 3725F SCREEN DEPRESSION PERFORMED: CPT | Performed by: INTERNAL MEDICINE

## 2022-03-07 PROCEDURE — 1170F FXNL STATUS ASSESSED: CPT | Performed by: INTERNAL MEDICINE

## 2022-03-07 PROCEDURE — 1160F RVW MEDS BY RX/DR IN RCRD: CPT | Performed by: INTERNAL MEDICINE

## 2022-03-07 RX ORDER — CALCIUM CARBONATE 300MG(750)
1 TABLET,CHEWABLE ORAL DAILY
COMMUNITY

## 2022-03-07 NOTE — ASSESSMENT & PLAN NOTE
11/29/2020 DEXA scan noted lumbar spine bone mineral densities normal range  Hip bone mineral density is consistent with osteopenia  Of patient is no longer taking Prolia    Recommend another DEXA scan in 2022 or 23

## 2022-03-07 NOTE — ASSESSMENT & PLAN NOTE
Weakness better  Strength in lower extremity better  Patient will go for therapy couple of marcescens and the probably he prefers to do on his own either at exercise place or at home  Overall both lower extremity strength appears normal   Neuropathy remains unchanged both feet

## 2022-03-07 NOTE — ASSESSMENT & PLAN NOTE
Mild the a unchanged neuropathy  Did not go for TSH and vitamin B12  Recent to blood sugar were somewhat elevated [id number]  Recommend hemoglobin A1c

## 2022-03-07 NOTE — ASSESSMENT & PLAN NOTE
03/07/2022: Abstract of note as per patient's report from recent eye doctor's appointment:  He was diagnosed to have 1  Cataract 2  Glaucoma and 3  Pterygium both eyes   Their plan is to treat glaucoma with the eye drops at this time as well as do corrective surgery for pterygium and monitor cut rec at this time  (he did have a remote history of pterygium many years ago)

## 2022-03-07 NOTE — PROGRESS NOTES
PT Re-Evaluation          Insurance:  AMA/CMS Eval/ Re-eval POC expires Giles Pérez #/ Referral # Total  Start date  Expiration date Extension  Visit limitation? PT only or  PT+OT? Co-Insurance   Self-Pay 2022 NA NA NA NA NA NA PT No (self-pay)                                                                    Today's date: 3/7/2022  Patient name: Xochitl Pepe  : 1945  MRN: [id number]  Referring provider: Kenneth Rice,*  Dx:   Encounter Diagnosis     ICD-10-CM    1  Balance disorder  R26 89    2  Weakness of both legs  R29 898    3  Imbalance  R26 89    4  Idiopathic chronic venous hypertension 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 [facility])  I87 333     L97 919     L97 920          Assessment  Assessment details: Patient is a 64year old male who is presenting to skilled OPPT for IE with reports of generalized LE weakness and decreased balance that has in turn limited safe completion of IADLs and functional mobility at home and in the community  PMH significant for: metastatic colorectal cancer, neuropathy, and RLE edema  Patient demonstrated overall improvements in the following outcome measures since IE on 2022: 5 x STS, FGA, DGI, and 6 MWT suggesting overall gains in functional LE strength, dynamic balance, and cardiovascular endurance, respectively  Per cutoff scores for the 5 x STS, FGA, and DGI he is no longer considered high fall risk and falls within normal ranges for the aforementioned tests  All normative values and cutoff scores taken from the APTA Neuro Section and Rehab Measures  He has currently met all short term goals and 7/9 long term goals  Based on patient's progress and current scores that fall within normative values, plan to d/c this date  Patient in agreement with d/c plan  Patient was educated on [facility] [employer] and provided educational handout  Additionally updated comprehensive HEP with patient verbalizing good understanding  He will no longer benefit from skilled OPPT services at this time  Impairments: Impaired balance, Impaired physical strength and Lacks appropriate HEP  Understanding of Dx/Px/POC: Excellent  Prognosis: Excellent    Patient verbalized understanding of POC  Please contact me if you have any questions or recommendations  Thank you for the referral and the opportunity to share in [address] [place] [address] care          Plan  Plan details: balance/strength training, TENS/vibration plate for neuropathy    Patient would benefit from: PT Eval and Skilled PT  Planned modality interventions: Biofeedback, Cryotherapy, TENS and Thermotherapy: Hydrocollator Packs  Planned therapy interventions: Balance, HEP, Neuromuscular re-education, Patient education, Strengthening, Stretching, Therapeutic activities and Therapeutic exercises  Frequency: 2x/wk  Duration in weeks: 12  Plan of Care beginning date: 1/26/2022  Plan of Care expiration date: 12 weeks - 4/20/2022  Treatment plan discussed with: Patient       Goals  Short Term Goals (4 weeks):    - Patient will be independent in basic HEP 2-3 weeks - MET  - Patient will improve 5xSTS score by 2 3 seconds from 17 43 seconds to 15 13 seconds to promote improved LE functional strength needed for ADLs - MET    Long Term Goals (12 weeks):  - Patient will be independent in a comprehensive home exercise program - MET   - Patient will improve scoring on DGI by 2 6 points from 19/24 to 22/24 to progress safety - MET (edited 3/7/22)  - Patient will improve scoring on FGA by 4 points from 19/30 to 23/30 to progress safety with dynamic tasks - MET  - Patient will be able to demonstrate HT in gait without veering - NOT MET  - Patient will improve 6 Minute Walk Test score by 190 feet from 1375 feet to 1565 feet to promote improved cardiovascular endurance - NOT MET, progressing  - Patient will report 50% reduction in near falls in order to improve safety with functional tasks and reduce his risk for falls - MET  - Patient will report going on walks at least 3 days per week to promote independence and improved cardiovascular endurance - MET  - Patient will be able to ascend/descend stairs reciprocally with 1 UE assist to promote independence and safety with ADLs - MET  - Patient will report 50% reduction in near falls when ambulating on uneven terrain - MET      Cut off score    All date taken from APTA Neuro Section or Rehab Measures      Fontaine/62  MDC: 6 pts  Age Norms:  61-76: M - 54   F - 55  70-79: M - 47   F - 53  80-89: M - 48   F - 50 5xSTS: Grace et al 2010  MDC: 2 3 sec  Age Norms:  60-69: 11 1 sec  70-79: 12 6 sec  80-89: 14 8 sec   TUG  MDC: 4 14 sec  Cut off score:  >13 5 sec community dwelling adults  >32 2 frail elderly  <20 I for basic transfers  >30 dependent on transfers 10 Meter Walk Test: Jay Ervin and Agustin Medico al   MDC: 0 18 m/s  20-29: M - 1 35 m   F - 1 34 m  30-39: M - 1 43 m   F - 1 34 m  40-49: M - 1 43 m   F - 1 39 m  50-59: M - 1 43 m   F - 1 31 m  60-69: M - 1 34 m   F - 1 24 m  70-79: M - 1 26 m   F - 1 13 m  80-89: M - 0 97 m   F - 0 94 m    Household Ambulator < 0 4 m/s  Limited Community Ambulator 0 4 - 0 8 m/s  [employer] Ambulator 0 8 - 1 2 m/s  Safely cross the street > 1 2 m/s   FGA  MCID: 4 pts  Geriatrics/community < 22/30 fall risk  Geriatrics/community < 20/30 unexplained falls    DGI  MDC: vestibular - 4 pts  MDC: geriatric/community - 3 pts  Falls risk <19/24 mCTSIB  Norm: 20-60 yrs  Eyes open firm: norm sway 0 21-0 48  Eyes closed firm: norm sway 0 48-0 99  Eyes open foam: norm sway 0 38-0 71  Eyes closed foam: norm sway 0 70-2 22   6 Minute Walk Test  MDC: 190 98 ft  MCID: 164 ft    Age Norms  60-69: M - 1876 ft (571 80 m)  F - 1765 ft (537 98 m)  70-79: M - 1729 ft (527 00 m)  F - 1545 ft (470 92 m)  80-89: M - 1368 ft (416 97 m)  F - 1286 ft (391 97 m) ABCParry Darlyn & Rufino Griffiths, 2003  <67% increased risk for falls         Subjective    History of Present Illness  - Mechanism of injury: Patient is a 64year old male who is presenting to skilled OPPT for IE with reports of generalized LE weakness and decreased balance  Patient reports RLE edema and B/L neuropathy that he feels has contributed to overall LE weakness  Additionally, he notes he underwent cancer treatment in 2017 that resulted in a prolonged hospitalization and concurrent rehab stay that resulted in generalized weakness and balance deficits that he feels he never fully recovered from  He is still undergoing medical treatment via oral medication for colorectal cancer, which he believes to be contributing to the neuropathy in his hands and feet  Update (3/7/2022): Patient reports that he feels he has noted improvements in balance and strength since starting therapy, with his wife also noting that he has more strength when getting up from a chair  He feels that his balance is no longer a primary concern and he would like to transition over to the gym where he can get personal training to improve his strength and power  He reports compliance with HEP       - Primary AD: none  - Assist level at home: independent  - Decreased fine motor tasks: No       Pain  - Current pain rating: NA/10    Social Support  - Steps to enter house: 7 ANA ROSA  - Stairs in house: 14 steps  - Lives in: 2 story house  - Lives with: wife    - Employment status: retired  - Hand dominance: R    Treatments  - Previous treatment: none  - Current treatment: none  - Diagnostic Testing: none      Objective     LE MMT  - R Hip Flexion: 4/5   L Hip Flexion: 4+/5  - R Hip Extension: 4/5   L Hip Extension: 4+/5  - R Hip Abduction: 4/5   L Hip Abduction: 4+/5  - R Hip Adduction: 4/5   L Hip Adduction: 4+/5  - R Knee Extension: 4-/5  L Knee Extension: 4/5  - R Knee Flexion: 4/5   L Knee Flexion: 4/5  - R Ankle DF: 4-/5   L Ankle DF: 4-/5  - R Ankle PF: 4-/5   L Ankle PF: 4-/5    Sensation  - Light touch: intact  - Deep pressure: intact  - Temperature: per patient's subjective reports, sensitive to temperature  - Proprioception: slightly diminished B/L (tested on 1st MTP joint)    Coordination  - Heel to Shin: normal  - Alternate Toe Taps: normal  - Finger to Nose: normal  - Finger to Finger: NT    Myelopathy Screen (>3/5 +)  - Teixeira's Reflex: NT  - Babinski Reflex: NT  - Inverted Supinator Sign: NT  - Age > 45: Yes  - Gait Deviation: No      Gait  - Abnormalities: mildly diminished heel strike/toe off    HEP Updated 3/7/2022  Access Code: [email or web address]  URL: [email or web address]  Date: 03/07/2022  Prepared by: Nick Tam    Exercises  · Backward Walking with Counter Support - 1 x daily - 7 x weekly  · Walking with Eyes Closed and Counter Support - 1 x daily - 7 x weekly  · Tandem Walking - 1 x daily - 7 x weekly  · Romberg Stance Eyes Closed on Foam Pad - 1 x daily - 7 x weekly - 3 sets - 30 sec hold         Outcome Measures Initial Eval  1/26/2022 PN  3/7/2022       5xSTS 17 43 sec 10 78 sec       TUG 9 21 sec NT       10 meter 1 25 m/s 1 23 m/s       FGA 19/30 23/30       DGI 19/24 22/24       mCTSIB  - FTEO (firm)  - FTEC (firm)  - FTEO (foam)  - FTEC (foam)   30 sec (+)  30 sec (+)  30 sec (+)  30 sec (+)   NT       6MWT 1375 ft 1475 ft                                    Precautions: colorectal cancer, neuropathy   Past Medical History:   Diagnosis Date    Cancer ([facility] [address] )     rectal    Heart murmur     High cholesterol     HLD (hyperlipidemia)     border line    Hx of radiation therapy

## 2022-03-07 NOTE — PROGRESS NOTES
Assessment and Plan:     Problem List Items Addressed This Visit        Digestive    Metastatic colorectal cancer ([facility] [address] ) - Primary      Other Visit Diagnoses     Medicare annual wellness visit, subsequent              Depression Screening and Follow-up Plan: Patient was screened for depression during today's encounter  They screened negative with a PHQ-2 score of 0  Preventive health issues were discussed with patient, and age appropriate screening tests were ordered as noted in patient's After Visit Summary  Personalized health advice and appropriate referrals for health education or preventive services given if needed, as noted in patient's After Visit Summary       History of Present Illness:     Patient presents for Medicare Annual Wellness visit    Patient Care Team:  Anand Schofield MD as PCP - General (Internal Medicine)  Anand Schofield MD as PCP - [address] [place] [address] (RTE)     Problem List:     Patient Active Problem List   Diagnosis    Diarrhea    Hyperglycemia    Hypertension    Leukocytosis    Metastatic colorectal cancer ([facility] [address] )    Hypercholesteremia    Bruise of both arms    Hemochromatosis    Vitamin D deficiency    Age related osteoporosis    Bleeding per rectum    Incisional hernia with obstruction but no gangrene    Left inguinal hernia    Right inguinal hernia    Other constipation    Encounter for surgical follow-up care    AV (angiodysplasia malformation of colon)    Edema of right lower leg    Balance disorder    Weakness of both legs    Idiopathic chronic venous hypertension 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 (HCC)    Neuropathy    Progressive peripheral pterygium of both eyes      Past Medical and Surgical History:     Past Medical History:   Diagnosis Date    Cancer ([facility] [address] )     rectal    Heart murmur     High cholesterol     HLD (hyperlipidemia)     border line    Hx of radiation therapy     Portacath in place     right chest wall      Past Surgical History:   Procedure Laterality Date    COLONOSCOPY      COLOSTOMY TAKEDOWN/REVERSE ANTON  2018    6 months after    HARTMANS PROCEDURE  2018    IR BALLOON-OCCLUDED ANTEGRADE TRANSVENOUS OBLITERATION (BATO)  5/3/2021    IR PORT REMOVAL  2021    LIVER BIOPSY      in 2017    PORTACATH PLACEMENT      in 2016   [address] [facility] HERNIA,5+Y/O,REDUCIBL Left 2021    Procedure: REPAIR HERNIA INGUINAL WITH MESH;  Surgeon: Lupe Siddiqi MD;  Location: [facility];  Service: General      Family History:     Family History   Problem Relation Age of Onset    No Known Problems Mother     No Known Problems Father       Social History:     Social History     Socioeconomic History    Marital status: /Civil Union     Spouse name: None    Number of children: None    Years of education: None    Highest education level: None   Occupational History    None   Tobacco Use    Smoking status: Former Smoker     Packs/day: 1 00     Years: 10 00     Pack years: 10 00     Quit date:      Years since quittin 1    Smokeless tobacco: Never Used    Tobacco comment: quit 20 years ago   Vaping Use    Vaping Use: Never used   Substance and Sexual Activity    Alcohol use: Yes     Comment: occasionally   Drug use: Never    Sexual activity: None   Other Topics Concern    None   Social History Narrative    Lives with wife  Social Determinants of Health     Financial Resource Strain: Not on file   Food Insecurity: Not on file   Transportation Needs: Not on file   Physical Activity: Not on file   Stress: Not on file   Social Connections: Not on file   Intimate Partner Violence: Not on file   Housing Stability: Not on file      Medications and Allergies:     Current Outpatient Medications   Medication Sig Dispense Refill    capecitabine (XELODA) 500 MG tablet 500 mg 2 (two) times a day On 2 weeks off 1 week    2 tabs bid      Cholecalciferol (VITAMIN D3) 3000 units TABS Take by mouth      Cyanocobalamin (VITAMIN B 12 PO) Take by mouth      folic acid (FOLVITE) 1 mg tablet Take by mouth daily      Multiple Vitamins-Minerals (CENTRUM SILVER PO) Take by mouth      polyethylene glycol (MIRALAX) 17 g packet Take 17 g by mouth daily for 7 days (Patient taking differently: Take 17 g by mouth as needed ) 119 g 0    potassium chloride (Klor-Con) 10 mEq tablet potassium chloride ER 10 mEq tablet,extended release       No current facility-administered medications for this visit  No Known Allergies   Immunizations:     Immunization History   Administered Date(s) Administered    COVID-19 MODERNA VACC 0 5 ML IM 01/21/2021, 02/19/2021, 10/25/2021    INFLUENZA 10/22/2017    Influenza, high dose seasonal 0 7 mL 10/20/2020, 11/15/2021    Influenza, injectable, quadrivalent, preservative free 0 5 mL 10/31/2019      Health Maintenance:         Topic Date Due    Hepatitis C Screening  Never done         Topic Date Due    Pneumococcal Vaccine: 65+ Years (1 of 4 - PCV13) Never done    DTaP,Tdap,and Td Vaccines (1 - Tdap) Never done      Medicare Health Risk Assessment:     Pulse 75   Temp 98 2 °F (36 8 °C)   Ht 6' (1 829 m)   Wt 81 6 kg (180 lb)   SpO2 99%   BMI 24 41 kg/m²      Tarun Pandey is here for his Subsequent Wellness visit  Last Medicare Wellness visit information reviewed, patient interviewed and updates made to the record today  Health Risk Assessment:   Patient rates overall health as very good  Patient feels that their physical health rating is slightly better  Patient is very satisfied with their life  Eyesight was rated as slightly worse  Hearing was rated as same  Patient feels that their emotional and mental health rating is much better  Patients states they are never, rarely angry  Patient states they are sometimes unusually tired/fatigued  Pain experienced in the last 7 days has been none  Patient states that he has experienced no weight loss or gain in last 6 months  Weight stays the same  He is about 180lbs  Depression Screening:   PHQ-2 Score: 0      Fall Risk Screening: In the past year, patient has experienced: no history of falling in past year      Home Safety:  Patient does not have trouble with stairs inside or outside of their home  Patient has working smoke alarms and has working carbon monoxide detector  Home safety hazards include: none  No home hazards  He feels safe  Lives with his wife  Son is nearby  Nutrition:   Current diet is Regular, No Added Salt and Limited junk food  Eats proteins and veggies  Medications:   Patient is currently taking over-the-counter supplements  OTC medications include: B12, C, D3, magnesium, tumeric, calcium  Patient is able to manage medications  No issues  Activities of Daily Living (ADLs)/Instrumental Activities of Daily Living (IADLs):   Walk and transfer into and out of bed and chair?: Yes  Dress and groom yourself?: Yes    Bathe or shower yourself?: Yes    Feed yourself? Yes  Do your laundry/housekeeping?: Yes  Manage your money, pay your bills and track your expenses?: Yes  Make your own meals?: Yes    Do your own shopping?: Yes    ADL comments: He is independent  He drives safely  Durable Medical Equipment Suppliers  [employer]    Previous Hospitalizations:   Any hospitalizations or ED visits within the last 12 months?: No      Hospitalization Comments: No issues  He has been stable with his Chronic conditions  Advance Care Planning:   Living will: No    Durable POA for healthcare: Yes    Advanced directive: Yes    Advanced directive counseling given: Yes    Five wishes given: No    Patient declined ACP directive: No    End of Life Decisions reviewed with patient: Yes    Provider agrees with end of life decisions: Yes      Comments: Reviewed with patient  He has Health care POA and advanced directive  He openly discusses with family      Cognitive Screening:   Provider or family/friend/caregiver concerned regarding cognition?: No    PREVENTIVE SCREENINGS      Cardiovascular Screening:    General: Screening Not Indicated and History Lipid Disorder      Diabetes Screening:     General: Screening Current      Colorectal Cancer Screening:     General: History Colorectal Cancer      Prostate Cancer Screening:    General: Screening Not Indicated      Osteoporosis Screening:    General: Screening Not Indicated and History Osteoporosis      Abdominal Aortic Aneurysm (AAA) Screening:    Risk factors include: tobacco use        General: Screening Not Indicated      Lung Cancer Screening:     General: Screening Not Indicated      Hepatitis C Screening:    General: Risks and Benefits Discussed and Patient Declines    Hep C Screening Accepted: No       Preventive Screening Comments: He has no risk forHep c  Sees eye doctor regularly  UTD with flu, pneumonia, covid  Screening, Brief Intervention, and Referral to Treatment (SBIRT)    Screening  Typical number of drinks in a day: 2  Typical number of drinks in a week: 12  Interpretation: Low risk drinking behavior  AUDIT-C Screenin) How often did you have a drink containing alcohol in the past year? 2 to 3 times a week  2) How many drinks did you have on a typical day when you were drinking in the past year? 1 to 2  3) How often did you have 6 or more drinks on one occasion in the past year? never    AUDIT-C Score: 3  Interpretation: Score 0-3 (male): Negative screen for alcohol misuse    Single Item Drug Screening:  How often have you used an illegal drug (including marijuana) or a prescription medication for non-medical reasons in the past year? never    Single Item Drug Screen Score: 0  Interpretation: Negative screen for possible drug use disorder    Brief Intervention  Healthy alcohol use/limits discussed  Other Counseling Topics:   Skin self-exam and calcium and vitamin D intake         Reny Griffiths MD

## 2022-03-07 NOTE — ASSESSMENT & PLAN NOTE
11/29/2021:  WBC 3 3, hemoglobin 15 3, , platelet 235,  86/09/1105:  WBC 2 8, hemoglobin 14 5, platelet 954,  3

## 2022-03-07 NOTE — PATIENT INSTRUCTIONS
Medicare Preventive Visit Patient Instructions  Thank you for completing your Welcome to Medicare Visit or Medicare Annual Wellness Visit today  Your next wellness visit will be due in one year (3/8/2023)  The screening/preventive services that you may require over the next 5-10 years are detailed below  Some tests may not apply to you based off risk factors and/or age  Screening tests ordered at today's visit but not completed yet may show as past due  Also, please note that scanned in results may not display below  Preventive Screenings:  Service Recommendations Previous Testing/Comments   Colorectal Cancer Screening  · Colonoscopy    · Fecal Occult Blood Test (FOBT)/Fecal Immunochemical Test (FIT)  · Fecal DNA/Cologuard Test  · Flexible Sigmoidoscopy Age: 54-65 years old   Colonoscopy: every 10 years (May be performed more frequently if at higher risk)  OR  FOBT/FIT: every 1 year  OR  Cologuard: every 3 years  OR  Sigmoidoscopy: every 5 years  Screening may be recommended earlier than age 48 if at higher risk for colorectal cancer  Also, an individualized decision between you and your healthcare provider will decide whether screening between the ages of 74-80 would be appropriate  Colonoscopy: 04/07/2021  FOBT/FIT: Not on file  Cologuard: Not on file  Sigmoidoscopy: Not on file          Prostate Cancer Screening Individualized decision between patient and health care provider in men between ages of 53-78   Medicare will cover every 12 months beginning on the day after your 50th birthday PSA: No results in last 5 years           Hepatitis C Screening Once for adults born between [birth year] and 1965  More frequently in patients at high risk for Hepatitis C Hep C Antibody: Not on file        Diabetes Screening 1-2 times per year if you're at risk for diabetes or have pre-diabetes Fasting glucose: No results in last 5 years   A1C: 6 0 %        Cholesterol Screening Once every 5 years if you don't have a lipid disorder  May order more often based on risk factors  Lipid panel: 12/21/2021           Other Preventive Screenings Covered by Medicare:  1  Abdominal Aortic Aneurysm (AAA) Screening: covered once if your at risk  You're considered to be at risk if you have a family history of AAA or a male between the age of 73-68 who smoking at least 100 cigarettes in your lifetime  2  Lung Cancer Screening: covers low dose CT scan once per year if you meet all of the following conditions: (1) Age 50-69; (2) No signs or symptoms of lung cancer; (3) Current smoker or have quit smoking within the last 15 years; (4) You have a tobacco smoking history of at least 30 pack years (packs per day x number of years you smoked); (5) You get a written order from a healthcare provider  3  Glaucoma Screening: covered annually if you're considered high risk: (1) You have diabetes OR (2) Family history of glaucoma OR (3)  aged 48 and older OR (3)  American aged 72 and older  3  Osteoporosis Screening: covered every 2 years if you meet one of the following conditions: (1) Have a vertebral abnormality; (2) On glucocorticoid therapy for more than 3 months; (3) Have primary hyperparathyroidism; (4) On osteoporosis medications and need to assess response to drug therapy  5  HIV Screening: covered annually if you're between the age of 12-76  Also covered annually if you are younger than 13 and older than 72 with risk factors for HIV infection  For pregnant patients, it is covered up to 3 times per pregnancy      Immunizations:  Immunization Recommendations   Influenza Vaccine Annual influenza vaccination during flu season is recommended for all persons aged >= 6 months who do not have contraindications   Pneumococcal Vaccine (Prevnar and Pneumovax)  * Prevnar = PCV13  * Pneumovax = PPSV23 Adults 25-60 years old: 1-3 doses may be recommended based on certain risk factors  Adults 72 years old: Prevnar (PCV13) vaccine recommended followed by Pneumovax (PPSV23) vaccine  If already received PPSV23 since turning 65, then PCV13 recommended at least one year after PPSV23 dose  Hepatitis B Vaccine 3 dose series if at intermediate or high risk (ex: diabetes, end stage renal disease, liver disease)   Tetanus (Td) Vaccine - COST NOT COVERED BY MEDICARE PART B Following completion of primary series, a booster dose should be given every 10 years to maintain immunity against tetanus  Td may also be given as tetanus wound prophylaxis  Tdap Vaccine - COST NOT COVERED BY MEDICARE PART B Recommended at least once for all adults  For pregnant patients, recommended with each pregnancy  Shingles Vaccine (Shingrix) - COST NOT COVERED BY MEDICARE PART B  2 shot series recommended in those aged 48 and above     Health Maintenance Due:      Topic Date Due    Hepatitis C Screening  Never done     Immunizations Due:      Topic Date Due    Pneumococcal Vaccine: 65+ Years (1 of 4 - PCV13) Never done    DTaP,Tdap,and Td Vaccines (1 - Tdap) Never done     Advance Directives   What are advance directives? Advance directives are legal documents that state your wishes and plans for medical care  These plans are made ahead of time in case you lose your ability to make decisions for yourself  Advance directives can apply to any medical decision, such as the treatments you want, and if you want to donate organs  What are the types of advance directives? There are many types of advance directives, and each state has rules about how to use them  You may choose a combination of any of the following:  · Living will: This is a written record of the treatment you want  You can also choose which treatments you do not want, which to limit, and which to stop at a certain time  This includes surgery, medicine, IV fluid, and tube feedings  · Durable power of  for healthcare [facility]):   This is a written record that states who you want to make healthcare choices for you when you are unable to make them for yourself  This person, called a proxy, is usually a family member or a friend  You may choose more than 1 proxy  · Do not resuscitate (DNR) order:  A DNR order is used in case your heart stops beating or you stop breathing  It is a request not to have certain forms of treatment, such as CPR  A DNR order may be included in other types of advance directives  · Medical directive: This covers the care that you want if you are in a coma, near death, or unable to make decisions for yourself  You can list the treatments you want for each condition  Treatment may include pain medicine, surgery, blood transfusions, dialysis, IV or tube feedings, and a ventilator (breathing machine)  · Values history: This document has questions about your views, beliefs, and how you feel and think about life  This information can help others choose the care that you would choose  Why are advance directives important? An advance directive helps you control your care  Although spoken wishes may be used, it is better to have your wishes written down  Spoken wishes can be misunderstood, or not followed  Treatments may be given even if you do not want them  An advance directive may make it easier for your family to make difficult choices about your care  © Copyright [employer] 2018 Information is for End User's use only and may not be sold, redistributed or otherwise used for commercial purposes  All illustrations and images included in CareNotes® are the copyrighted property of [employer]  or Jamilah Hernandez     Follow with Consultants as per their and our suggestion    Follow up in 12 week(s) or as needed earlier    Follow all instructions as advised and discussed  Take your medications as prescribed  Call the office immediately if you experience any side effects  Ask questions if you do not understand      Keep your scheduled appointment as advised or come sooner if necessary or in doubt  Best time to call for non-urgent matter or questions on weekdays is between 9am and 12 noon  See physician for any new symptoms or worsening of current symptoms  Urgent or emergent situations call 911 and report to nearest emergency room      I spent  30 -40 minutes taking care of this patient including clinical care, conseling, collaboration, chart, lab and consultaion review as appropriate    Patient is to get labs 1 week(s) prior to next visit if advised

## 2022-03-07 NOTE — PROGRESS NOTES
Answers for HPI/ROS submitted by the patient on 3/6/2022  How would you rate your overall health?: very good  Compared to last year, how is your physical health?: slightly better  In general, how satisfied are you with your life?: very satisfied  Compared to last year, how is your eyesight?: slightly worse  Compared to last year, how is your hearing?: same  Compared to last year, how is your emotional/mental health?: much better  How often is anger a problem for you?: never, rarely  How often do you feel unusually tired/fatigued?: sometimes  In the past 7 days, how much pain have you experienced?: none  In the past 6 months, have you lost or gained 10 pounds without trying?: No  One or more falls in the last year: No  Do you have trouble with the stairs inside or outside your home?: No  Does your home have working smoke alarms?: Yes  Does your home have a carbon monoxide monitor?: Yes  Which safety hazards (if any) have you experienced in your home? Please select all that apply : none  How would you describe your current diet?  Please select all that apply : Regular, No Added Salt, Limited junk food  In addition to prescription medications, are you taking any over-the-counter supplements?: Yes  If yes, what supplements are you taking?: B12, C, D3, magnesium, tumeric, calcium  Can you manage your medications?: Yes  Are you currently taking any opioid medications?: No  Can you walk and transfer into and out of your bed and chair?: Yes  Can you dress and groom yourself?: Yes  Can you bathe or shower yourself?: Yes  Can you feed yourself?: Yes  Can you do your laundry/ housekeeping?: Yes  Can you manage your money, pay your bills, and track your expenses?: Yes  Can you make your own meals?: Yes  Can you do your own shopping?: Yes  Please list your DME (Durable Medical Equipment) supplier, if you use one : Min  Within the last 12 months, have you had any hospitalizations or Emergency Department visits?: No  Do you have a living will?: No  Do you have a Durable POA (Power of ) for healthcare decisions?: Yes  Do you have an Advanced Directive for end of life decisions?: Yes  How often have you used an illegal drug (including marijuana) or a prescription medication for non-medical reasons in the past year?: never  What is the typical number of drinks you consume in a day?: 2  What is the typical number of drinks you consume in a week?: 12  How often did you have a drink containing alcohol in the past year?: 2 to 3 times a week  How many drinks did you have on a typical day  when you were drinking in the past year?: 1 to 2  How often did you have 6 or more drinks on one occasion in the past year?: never    [facility] Office Visit Note  22     Aixa Robison 64 y o  male MRN: [id number]  : 1945    Assessment:     1  Weakness of both legs  Assessment & Plan:  Weakness better  Strength in lower extremity better  Patient will go for therapy couple of marcescens and the probably he prefers to do on his own either at exercise place or at home  Overall both lower extremity strength appears normal   Neuropathy remains unchanged both feet  2  Metastatic colorectal cancer [facility])  Assessment & Plan:  Symptom-free  Good functional status  Being followed by oncologist closely  3  Medicare annual wellness visit, subsequent    4  Neuropathy  Assessment & Plan:  Mild the a unchanged neuropathy  Did not go for TSH and vitamin B12  Recent to blood sugar were somewhat elevated [id number]  Recommend hemoglobin A1c  Orders:  -     Hemoglobin A1C; Future; Expected date: 2022    5  Hypercholesteremia  Assessment & Plan:  Cholesterol in November was normal LDL  Of statin      6  Hemochromatosis, unspecified hemochromatosis type  -     Hemoglobin A1C; Future; Expected date: 2022    7  Vitamin D deficiency    8  Balance disorder    9  Edema of right lower leg    10   Hyperglycemia  -     Hemoglobin A1C; Future; Expected date: 03/28/2022    11  Bleeding per rectum  Assessment & Plan:  No bleeding per rectum  12  Left inguinal hernia    13  Other constipation    14  Chemotherapy-induced neutropenia (HCC)  Assessment & Plan:  11/29/2021:  WBC 3 3, hemoglobin 15 3, , platelet 869,  21/82/0415:  WBC 2 8, hemoglobin 14 5, platelet 615,  3        15  Pterygium of both eyes  Assessment & Plan:  03/07/2022: Abstract of note as per patient's report from recent eye doctor's appointment:  He was diagnosed to have 1  Cataract 2  Glaucoma and 3  Pterygium both eyes   Their plan is to treat glaucoma with the eye drops at this time as well as do corrective surgery for pterygium and monitor cut rec at this time  (he did have a remote history of pterygium many years ago)        12  Glaucoma of both eyes, unspecified glaucoma type  Assessment & Plan:  03/07/2022: Abstract of note as per patient's report from recent eye doctor's appointment:  He was diagnosed to have 1  Cataract 2  Glaucoma and 3  Pterygium both eyes   Their plan is to treat glaucoma with the eye drops at this time as well as do corrective surgery for pterygium and monitor cut rec at this time  (he did have a remote history of pterygium many years ago)            Discussion Summary and Plan: Today's care plan and medications were reviewed with patient in detail and all their questions answered to their satisfaction  Chief Complaint   Patient presents with    Medicare Wellness Visit      Subjective:  Chronic disease management     Ophthalmology:  Patient recently was having problem with glasses  He 1 4 to be recheck to optometrist   Subsequently was referred to ophthalmologist in [facility]  He was diagnosed to have 1  Cataract 2  Glaucoma and 3  Pterygium both eyes   Their plan is to treat glaucoma with the eye drops at this time as well as do corrective surgery for pterygium and monitor cut rec at this time      Patient is here for follow-up of balance  The the physical therapist notes were reviewed  Patient strength is improving  He still feels that he does have some balance issues  He is planning to go for therapy couple of more times  He probably would like to go for outpatient exercise placed on his own  Infect he was wondering if he could join his son's as softball game  He is neuropathy in both feet remains fairly stable  His blood sugar were somewhat elevated in December and November  I will add hemoglobin A1c  He did not get a chance to get TSH and vitamin B12  Generally seems to be doin    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:  Unchanged  He does not think he needs any treatment  His alcohol is conception noted as reported by him  GI:  Is 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 are stable  Patient very closely being followed by [facility] oncologist   He gets blood test on monthly basis  His WBC remains chronically low and felt to be secondary to chemotherapy  Hemoglobin is stable  Platelet is stable  Appetite is fairly stable  Recent ultrasound reviewed  Ecchymosis:  Unchanged in the forearm better in arm generally not a major issue    Hemochromatosis:  Donates blood periodically to help hemochromatosis  CBC and ferritin being followed by hemato oncologist  07/16/2021:  MRI of abdomen:  1  High level of liver iron overload, with estimated iron concentration of 196 (micro)mol/g dry weight using MRQuantif software (Nl <36 (micro)mol/g)  No steatosis assessment  No splenic iron overload  Hemochromatosis canelevated blood blood sugar 2     2  Four liver hemangiomas as described, grossly unchanged since February 2017      3   Small fat-containing ventral abdominal wall hernia, similar to prior study  03/30/2021:  CT scan of abdomen and pelvis:  1  Multiple enhancing hepatic lesions appear mostly similar to prior MRI study    Repeat MRI as clinically indicated      2   Other findings as described are unchanged      04/07/2021:  Colonoscopy:  1  AVM noted in the distal rectum  2  Relative narrowing of the rectosigmoid area probably from previous surgery and radiation therapy  3  AVM also noted in the ascending colon  4  There is area of relative narrowing in the hepatic flexure area  This area appears benign  Biopsies taken  PSA:  PSA was done by [facility] oncologist   Patient will find out the results and will let us know  12/20/2021:  Abdomen ultrasound and compared with MRI of 07/16/2021 of abdomen:  Impression 2 small left renal parenchymal simple cyst, abnormal hepatic parenchymal echotexture consistent with hemochromatosis as noted clinically as well as on MRI, lack of redemonstration of hepatic parenchymal hemangioma  Colorectal cancer:  Patient remains under care of oncologist he recently had extensive test done by [facility] oncologist the reportedly unremarkable as well as ultrasound of abdomen done which was unremarkable  Patient denies any abdominal pain nausea vomiting constipation diarrhea hematemesis melena or bleeding  Hyperlipidemia:  He is off atorvastatin    Total cholesterol 185 HDL 66 triglyceride 67 and   Blood sugar will monitor           06/15/2021:  CBC unremarkable, CMP unremarkable, LFTs normal, lipid profile with LDL to the target, iron studies not available  Date:?: WBC 3 1 hemoglobin 11 4 , -48 5, CA 15-3 normal 22 4, CA 19-9:  18 9 normal, CEA 4 6 normal, ferritin 423, T3, T4, TSH normal    11/29/2021 LDL was 93 total cholesterol 183 and HDL 68 WBC 3 3, hemoglobin 15 3, , [place] [address] 8 mildly elevated, CA  6 normal, CA 19 921 normal, CEA 4 1 normal, ferritin 368 normal, PSA 0 15 free the normal, total PSA 1 01 normal, blood sugar 138 rest of the CMP normal, iron 197, direct bilirubin 0 40 TIBC 318  phosphorus 3 8 and magnesium 1 6  12/21/21:  Blood sugar 109, rest of the CMP normal, , HDL 66, triglyceride 67  12/28/2021:  WBC 2 8 hemoglobin 14 8 , retic count 1 85, CMP normal except blood glucose 115, iron 213 direct bilirubin 0 3 TIBC 307  phosphorus 3 1 magnesium 1 9,  45 mildly elevated, CA  6 normal, CA  3 normal, CEA 3 5 normal, PSA 0 16 free PSA, total PSA 1 06 normal, ferritin 382, T3 3 12 normal, T4 0 71 mildly low, TSH 1 44 normal,        The following portions of the patient's history were reviewed and updated as appropriate: allergies, current medications, past family history, past medical history, past social history, past surgical history and problem list     Review of Systems   All other systems reviewed and are negative          Historical Information   Patient Active Problem List   Diagnosis    Diarrhea    Hyperglycemia    Hypertension    Leukocytosis    Metastatic colorectal cancer (HCC)    Hypercholesteremia    Bruise of both arms    Hemochromatosis    Vitamin D deficiency    Osteopenia    Bleeding per rectum    Incisional hernia with obstruction but no gangrene    Left inguinal hernia    Right inguinal hernia    Other constipation    Encounter for surgical follow-up care    AV (angiodysplasia malformation of colon)    Edema of right lower leg    Balance disorder    Weakness of both legs    Idiopathic chronic venous hypertension 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 (HCC)    Neuropathy    Pterygium of both eyes    Chemotherapy-induced neutropenia (HCC)    Glaucoma     Past Medical History:   Diagnosis Date    Cancer ([address] )     rectal    Heart murmur     High cholesterol     HLD (hyperlipidemia)     border line    Hx of radiation therapy      Past Surgical History:   Procedure Laterality Date    COLONOSCOPY      COLOSTOMY TAKEDOWN/REVERSE ANTON  2018    6 months after    HARTMANS PROCEDURE  2018    IR BALLOON-OCCLUDED ANTEGRADE TRANSVENOUS OBLITERATION (BATO)  5/3/2021    IR PORT REMOVAL  5/7/2021    LIVER BIOPSY      in 5/2017    [facility]      in    [address] HERNIA,5+Y/O,REDUCIBL Left 2021    Procedure: REPAIR HERNIA INGUINAL WITH MESH;  Surgeon: Hedy Marie MD;  Location: [facility];  Service: General     Social History     Substance and Sexual Activity   Alcohol Use Yes    Comment: occasionally  Social History     Substance and Sexual Activity   Drug Use Never     Social History     Tobacco Use   Smoking Status Former Smoker    Packs/day: 1 00    Years: 10 00    Pack years: 10 00    Quit date:     Years since quittin 1   Smokeless Tobacco Never Used   Tobacco Comment    quit 20 years ago     Family History   Problem Relation Age of Onset    No Known Problems Mother     No Known Problems Father      Health Maintenance Due   Topic    Hepatitis C Screening     Pneumococcal Vaccine: 65+ Years (1 of 4 - PCV13)    DTaP,Tdap,and Td Vaccines (1 - Tdap)    PT PLAN OF CARE       Meds/Allergies       Current Outpatient Medications:     CALCIUM MAGNESIUM 750 PO, Take 2 tablets by mouth in the morning, Disp: , Rfl:     capecitabine (XELODA) 500 MG tablet, 500 mg 2 (two) times a day On 2 weeks off 1 week    2 tabs bid, Disp: , Rfl:     Cholecalciferol (VITAMIN D3) 3000 units TABS, Take by mouth, Disp: , Rfl:     Cyanocobalamin (VITAMIN B 12 PO), Take by mouth, Disp: , Rfl:     folic acid (FOLVITE) 1 mg tablet, Take by mouth daily, Disp: , Rfl:     Magnesium 400 MG TABS, Take 1 tablet by mouth in the morning, Disp: , Rfl:     Misc Natural Products (TUMERSAID PO), Take 1 tablet by mouth in the morning, Disp: , Rfl:     Multiple Vitamins-Minerals (CENTRUM SILVER PO), Take by mouth, Disp: , Rfl:     potassium chloride (Klor-Con) 10 mEq tablet, potassium chloride ER 10 mEq tablet,extended release, Disp: , Rfl:     polyethylene glycol (MIRALAX) 17 g packet, Take 17 g by mouth daily for 7 days (Patient taking differently: Take 17 g by mouth as needed ), Disp: 119 g, Rfl: 0      Objective:    Vitals:   BP 132/70   Pulse 75   Temp 98 2 °F (36 8 °C)   Ht 6' (1 829 m)   Wt 81 6 kg (180 lb)   SpO2 99%   BMI 24 41 kg/m²   Body mass index is 24 41 kg/m²  Vitals:    03/07/22 0929   Weight: 81 6 kg (180 lb)       Physical Exam  Vitals and nursing note reviewed  Constitutional:       Appearance: He is well-developed  HENT:      Head: Normocephalic and atraumatic  Right Ear: External ear normal       Left Ear: External ear normal    Eyes:      General:         Right eye: No discharge  Left eye: No discharge  Conjunctiva/sclera: Conjunctivae normal    Neck:      Thyroid: No thyromegaly  Vascular: No JVD  Cardiovascular:      Rate and Rhythm: Regular rhythm  Heart sounds: Normal heart sounds  Pulmonary:      Effort: No respiratory distress  Breath sounds: Normal breath sounds  No wheezing or rales  Abdominal:      General: Bowel sounds are normal  There is no distension  Palpations: There is no mass  Tenderness: There is no abdominal tenderness  There is no rebound  Lymphadenopathy:      Cervical: No cervical adenopathy  Skin:     General: Skin is warm  Findings: No rash  Neurological:      Mental Status: He is oriented to person, place, and time  Sensory: Sensory deficit present  Comments: His gait appears reasonably stable  Both lower extremity strength appears normal   Back is the pain-free  Psychiatric:         Mood and Affect: Mood normal          Behavior: Behavior normal          Thought Content: Thought content normal          Judgment: Judgment normal          Lab Review   No visits with results within 2 Month(s) from this visit     Latest known visit with results is:   Orders Only on 12/21/2021   Component Date Value Ref Range Status    Glucose, Random 12/21/2021 109* 65 - 99 mg/dL Final    BUN 12/21/2021 13  8 - 27 mg/dL Final    Creatinine 12/21/2021 1 09  0 76 - 1 27 mg/dL Final    eGFR Non  12/21/2021 65  >59 mL/min/1 73 Final    eGFR  12/21/2021 75  >59 mL/min/1 73 Final    Comment: **In accordance with recommendations from the NKF-ASN Task force,**    [facility] is in the process of updating its eGFR calculation to the    2021 CKD-EPI creatinine equation that estimates kidney function    without a race variable   SL AMB BUN/CREATININE RATIO 12/21/2021 12  10 - 24 Final    Sodium 12/21/2021 145* 134 - 144 mmol/L Final    Potassium 12/21/2021 4 6  3 5 - 5 2 mmol/L Final    Chloride 12/21/2021 106  96 - 106 mmol/L Final    CO2 12/21/2021 26  20 - 29 mmol/L Final    CALCIUM 12/21/2021 9 7  8 6 - 10 2 mg/dL Final    Protein, Total 12/21/2021 6 8  6 0 - 8 5 g/dL Final    Albumin 12/21/2021 4 2  3 7 - 4 7 g/dL Final    Globulin, Total 12/21/2021 2 6  1 5 - 4 5 g/dL Final    Albumin/Globulin Ratio 12/21/2021 1 6  1 2 - 2 2 Final    TOTAL BILIRUBIN 12/21/2021 0 3  0 0 - 1 2 mg/dL Final    Alk Phos Isoenzymes 12/21/2021 98  44 - 121 IU/L Final                  **Please note reference interval change**    AST 12/21/2021 29  0 - 40 IU/L Final    ALT 12/21/2021 16  0 - 44 IU/L Final    Cholesterol, Total 12/21/2021 185  100 - 199 mg/dL Final    Triglycerides 12/21/2021 67  0 - 149 mg/dL Final    HDL 12/21/2021 66  >39 mg/dL Final    VLDL Cholesterol Calculated 12/21/2021 13  5 - 40 mg/dL Final    LDL Calculated 12/21/2021 106* 0 - 99 mg/dL Final         Patient Instructions       Medicare Preventive Visit Patient Instructions  Thank you for completing your Welcome to Medicare Visit or Medicare Annual Wellness Visit today  Your next wellness visit will be due in one year (3/8/2023)  The screening/preventive services that you may require over the next 5-10 years are detailed below  Some tests may not apply to you based off risk factors and/or age  Screening tests ordered at today's visit but not completed yet may show as past due   Also, please note that scanned in results may not display below   Preventive Screenings:  Service Recommendations Previous Testing/Comments   Colorectal Cancer Screening  · Colonoscopy    · Fecal Occult Blood Test (FOBT)/Fecal Immunochemical Test (FIT)  · Fecal DNA/Cologuard Test  · Flexible Sigmoidoscopy Age: 54-65 years old   Colonoscopy: every 10 years (May be performed more frequently if at higher risk)  OR  FOBT/FIT: every 1 year  OR  Cologuard: every 3 years  OR  Sigmoidoscopy: every 5 years  Screening may be recommended earlier than age 48 if at higher risk for colorectal cancer  Also, an individualized decision between you and your healthcare provider will decide whether screening between the ages of 74-80 would be appropriate  Colonoscopy: 04/07/2021  FOBT/FIT: Not on file  Cologuard: Not on file  Sigmoidoscopy: Not on file          Prostate Cancer Screening Individualized decision between patient and health care provider in men between ages of 53-78   Medicare will cover every 12 months beginning on the day after your 50th birthday PSA: No results in last 5 years           Hepatitis C Screening Once for adults born between 80 and 1965  More frequently in patients at high risk for Hepatitis C Hep C Antibody: Not on file        Diabetes Screening 1-2 times per year if you're at risk for diabetes or have pre-diabetes Fasting glucose: No results in last 5 years   A1C: 6 0 %        Cholesterol Screening Once every 5 years if you don't have a lipid disorder  May order more often based on risk factors  Lipid panel: 12/21/2021           Other Preventive Screenings Covered by Medicare:  1  Abdominal Aortic Aneurysm (AAA) Screening: covered once if your at risk  You're considered to be at risk if you have a family history of AAA or a male between the age of 73-68 who smoking at least 100 cigarettes in your lifetime    2  Lung Cancer Screening: covers low dose CT scan once per year if you meet all of the following conditions: (1) Age 50-69; (2) No signs or symptoms of lung cancer; (3) Current smoker or have quit smoking within the last 15 years; (4) You have a tobacco smoking history of at least 30 pack years (packs per day x number of years you smoked); (5) You get a written order from a healthcare provider  3  Glaucoma Screening: covered annually if you're considered high risk: (1) You have diabetes OR (2) Family history of glaucoma OR (3)  aged 48 and older OR (3)  American aged 72 and older  3  Osteoporosis Screening: covered every 2 years if you meet one of the following conditions: (1) Have a vertebral abnormality; (2) On glucocorticoid therapy for more than 3 months; (3) Have primary hyperparathyroidism; (4) On osteoporosis medications and need to assess response to drug therapy  5  HIV Screening: covered annually if you're between the age of 12-76  Also covered annually if you are younger than 13 and older than 72 with risk factors for HIV infection  For pregnant patients, it is covered up to 3 times per pregnancy  Immunizations:  Immunization Recommendations   Influenza Vaccine Annual influenza vaccination during flu season is recommended for all persons aged >= 6 months who do not have contraindications   Pneumococcal Vaccine (Prevnar and Pneumovax)  * Prevnar = PCV13  * Pneumovax = PPSV23 Adults 25-60 years old: 1-3 doses may be recommended based on certain risk factors  Adults 72 years old: Prevnar (PCV13) vaccine recommended followed by Pneumovax (PPSV23) vaccine  If already received PPSV23 since turning 65, then PCV13 recommended at least one year after PPSV23 dose  Hepatitis B Vaccine 3 dose series if at intermediate or high risk (ex: diabetes, end stage renal disease, liver disease)   Tetanus (Td) Vaccine - COST NOT COVERED BY MEDICARE PART B Following completion of primary series, a booster dose should be given every 10 years to maintain immunity against tetanus  Td may also be given as tetanus wound prophylaxis     Tdap Vaccine - COST NOT COVERED BY MEDICARE PART B Recommended at least once for all adults  For pregnant patients, recommended with each pregnancy  Shingles Vaccine (Shingrix) - COST NOT COVERED BY MEDICARE PART B  2 shot series recommended in those aged 48 and above     Health Maintenance Due:      Topic Date Due    Hepatitis C Screening  Never done     Immunizations Due:      Topic Date Due    Pneumococcal Vaccine: 65+ Years (1 of 4 - PCV13) Never done    DTaP,Tdap,and Td Vaccines (1 - Tdap) Never done     Advance Directives   What are advance directives? Advance directives are legal documents that state your wishes and plans for medical care  These plans are made ahead of time in case you lose your ability to make decisions for yourself  Advance directives can apply to any medical decision, such as the treatments you want, and if you want to donate organs  What are the types of advance directives? There are many types of advance directives, and each state has rules about how to use them  You may choose a combination of any of the following:  · Living will: This is a written record of the treatment you want  You can also choose which treatments you do not want, which to limit, and which to stop at a certain time  This includes surgery, medicine, IV fluid, and tube feedings  · Durable power of  for healthcare [facility]): This is a written record that states who you want to make healthcare choices for you when you are unable to make them for yourself  This person, called a proxy, is usually a family member or a friend  You may choose more than 1 proxy  · Do not resuscitate (DNR) order:  A DNR order is used in case your heart stops beating or you stop breathing  It is a request not to have certain forms of treatment, such as CPR  A DNR order may be included in other types of advance directives  · Medical directive:   This covers the care that you want if you are in a coma, near death, or unable to make decisions for yourself  You can list the treatments you want for each condition  Treatment may include pain medicine, surgery, blood transfusions, dialysis, IV or tube feedings, and a ventilator (breathing machine)  · Values history: This document has questions about your views, beliefs, and how you feel and think about life  This information can help others choose the care that you would choose  Why are advance directives important? An advance directive helps you control your care  Although spoken wishes may be used, it is better to have your wishes written down  Spoken wishes can be misunderstood, or not followed  Treatments may be given even if you do not want them  An advance directive may make it easier for your family to make difficult choices about your care  © Copyright [employer] 2018 Information is for End User's use only and may not be sold, redistributed or otherwise used for commercial purposes  All illustrations and images included in CareNotes® are the copyrighted property of [employer]  or [employer]     Follow with Consultants as per their and our suggestion    Follow up in 12 week(s) or as needed earlier    Follow all instructions as advised and discussed  Take your medications as prescribed  Call the office immediately if you experience any side effects  Ask questions if you do not understand  Keep your scheduled appointment as advised or come sooner if necessary or in doubt  Best time to call for non-urgent matter or questions on weekdays is between 9am and 12 noon  See physician for any new symptoms or worsening of current symptoms  Urgent or emergent situations call 911 and report to nearest emergency room      I spent  30 -40 minutes taking care of this patient including clinical care, conseling, collaboration, chart, lab and consultaion review as appropriate    Patient is to get labs 1 week(s) prior to next visit if advised               Dr Gerry Mata MD  [facility]       "This note has been constructed using a voice recognition system  Therefore there may be syntax, spelling, and/or grammatical errors  Please call if you have any questions  "  Answers for HPI/ROS submitted by the patient on 3/6/2022  How would you rate your overall health?: very good  Compared to last year, how is your physical health?: slightly better  In general, how satisfied are you with your life?: very satisfied  Compared to last year, how is your eyesight?: slightly worse  Compared to last year, how is your hearing?: same  Compared to last year, how is your emotional/mental health?: much better  How often is anger a problem for you?: never, rarely  How often do you feel unusually tired/fatigued?: sometimes  In the past 7 days, how much pain have you experienced?: none  In the past 6 months, have you lost or gained 10 pounds without trying?: No  One or more falls in the last year: No  Do you have trouble with the stairs inside or outside your home?: No  Does your home have working smoke alarms?: Yes  Does your home have a carbon monoxide monitor?: Yes  Which safety hazards (if any) have you experienced in your home? Please select all that apply : none  How would you describe your current diet?  Please select all that apply : Regular, No Added Salt, Limited junk food  In addition to prescription medications, are you taking any over-the-counter supplements?: Yes  If yes, what supplements are you taking?: B12, C, D3, magnesium, tumeric, calcium  Can you manage your medications?: Yes  Are you currently taking any opioid medications?: No  Can you walk and transfer into and out of your bed and chair?: Yes  Can you dress and groom yourself?: Yes  Can you bathe or shower yourself?: Yes  Can you feed yourself?: Yes  Can you do your laundry/ housekeeping?: Yes  Can you manage your money, pay your bills, and track your expenses?: Yes  Can you make your own meals?: Yes  Can you do your own shopping?: Yes  Please list your DME (Durable Medical Equipment) supplier, if you use one : Min  Within the last 12 months, have you had any hospitalizations or Emergency Department visits?: No  Do you have a living will?: No  Do you have a Durable POA (Power of ) for healthcare decisions?: Yes  Do you have an Advanced Directive for end of life decisions?: Yes  How often have you used an illegal drug (including marijuana) or a prescription medication for non-medical reasons in the past year?: never  What is the typical number of drinks you consume in a day?: 2  What is the typical number of drinks you consume in a week?: 12  How often did you have a drink containing alcohol in the past year?: 2 to 3 times a week  How many drinks did you have on a typical day  when you were drinking in the past year?: 1 to 2  How often did you have 6 or more drinks on one occasion in the past year?: never      Answers for HPI/ROS submitted by the patient on 3/6/2022  How would you rate your overall health?: very good  Compared to last year, how is your physical health?: slightly better  In general, how satisfied are you with your life?: very satisfied  Compared to last year, how is your eyesight?: slightly worse  Compared to last year, how is your hearing?: same  Compared to last year, how is your emotional/mental health?: much better  How often is anger a problem for you?: never, rarely  How often do you feel unusually tired/fatigued?: sometimes  In the past 7 days, how much pain have you experienced?: none  In the past 6 months, have you lost or gained 10 pounds without trying?: No  One or more falls in the last year: No  Do you have trouble with the stairs inside or outside your home?: No  Does your home have working smoke alarms?: Yes  Does your home have a carbon monoxide monitor?: Yes  Which safety hazards (if any) have you experienced in your home? Please select all that apply : none  How would you describe your current diet?  Please select all that apply : Regular, No Added Salt, Limited junk food  In addition to prescription medications, are you taking any over-the-counter supplements?: Yes  If yes, what supplements are you taking?: B12, C, D3, magnesium, tumeric, calcium  Can you manage your medications?: Yes  Are you currently taking any opioid medications?: No  Can you walk and transfer into and out of your bed and chair?: Yes  Can you dress and groom yourself?: Yes  Can you bathe or shower yourself?: Yes  Can you feed yourself?: Yes  Can you do your laundry/ housekeeping?: Yes  Can you manage your money, pay your bills, and track your expenses?: Yes  Can you make your own meals?: Yes  Can you do your own shopping?: Yes  Please list your DME (Durable Medical Equipment) supplier, if you use one : Min  Within the last 12 months, have you had any hospitalizations or Emergency Department visits?: No  Do you have a living will?: No  Do you have a Durable POA (Power of ) for healthcare decisions?: Yes  Do you have an Advanced Directive for end of life decisions?: Yes  How often have you used an illegal drug (including marijuana) or a prescription medication for non-medical reasons in the past year?: never  What is the typical number of drinks you consume in a day?: 2  What is the typical number of drinks you consume in a week?: 12  How often did you have a drink containing alcohol in the past year?: 2 to 3 times a week  How many drinks did you have on a typical day  when you were drinking in the past year?: 1 to 2  How often did you have 6 or more drinks on one occasion in the past year?: never

## 2022-05-05 ENCOUNTER — OFFICE VISIT (OUTPATIENT)
Dept: INTERNAL MEDICINE CLINIC | Facility: CLINIC | Age: 77
End: 2022-05-05
Payer: COMMERCIAL

## 2022-05-05 VITALS
HEART RATE: 75 BPM | WEIGHT: 177 LBS | OXYGEN SATURATION: 100 % | HEIGHT: 72 IN | SYSTOLIC BLOOD PRESSURE: 126 MMHG | TEMPERATURE: 97.5 F | BODY MASS INDEX: 23.98 KG/M2 | DIASTOLIC BLOOD PRESSURE: 60 MMHG

## 2022-05-05 DIAGNOSIS — H11.003 PTERYGIUM OF BOTH EYES: Primary | ICD-10-CM

## 2022-05-05 DIAGNOSIS — H25.013 CORTICAL AGE-RELATED CATARACT OF BOTH EYES: ICD-10-CM

## 2022-05-05 DIAGNOSIS — H40.9 GLAUCOMA OF BOTH EYES, UNSPECIFIED GLAUCOMA TYPE: ICD-10-CM

## 2022-05-05 PROBLEM — H25.9 AGE-RELATED CATARACT: Status: ACTIVE | Noted: 2022-05-05

## 2022-05-05 PROCEDURE — 3078F DIAST BP <80 MM HG: CPT | Performed by: INTERNAL MEDICINE

## 2022-05-05 PROCEDURE — 99214 OFFICE O/P EST MOD 30 MIN: CPT | Performed by: INTERNAL MEDICINE

## 2022-05-05 PROCEDURE — 1036F TOBACCO NON-USER: CPT | Performed by: INTERNAL MEDICINE

## 2022-05-05 PROCEDURE — 3074F SYST BP LT 130 MM HG: CPT | Performed by: INTERNAL MEDICINE

## 2022-05-05 PROCEDURE — 1160F RVW MEDS BY RX/DR IN RCRD: CPT | Performed by: INTERNAL MEDICINE

## 2022-05-05 RX ORDER — BIMATOPROST 0.01 %
DROPS OPHTHALMIC (EYE)
COMMUNITY
Start: 2022-04-19

## 2022-05-05 NOTE — ASSESSMENT & PLAN NOTE
He was diagnosed to have 1  Cataract 2  Glaucoma and 3  Pterygium both eyes   T     corrective surgery  for pterygium  Glaucoma to be managed with eyedrops    Cataract in the future per ophthalmologist    Patient is cleared for upcoming elective eye surgery under local anesthesia no obvious contraindication risk is low

## 2022-05-05 NOTE — PROGRESS NOTES
[facility] Visit Note  22     Sarah Lipoma 64 y o  male MRN: [id number]  : 1945    Assessment:     1  Pterygium of both eyes  Assessment & Plan:  He was diagnosed to have 1  Cataract 2  Glaucoma and 3  Pterygium both eyes   T     corrective surgery  for pterygium  Glaucoma to be managed with eyedrops  Cataract in the future per ophthalmologist    Patient is cleared for upcoming elective eye surgery under local anesthesia no obvious contraindication risk is low      2  Glaucoma of both eyes, unspecified glaucoma type  Assessment & Plan:  Eyedrops per ophthalmologist regarding glaucoma on Lumigan      3  Cortical age-related cataract of both eyes  Assessment & Plan:  Get rec surveillance by ophthalmologist   Worse reading glasses          Discussion Summary and Plan: Today's care plan and medications were reviewed with patient in detail and all their questions answered to their satisfaction  Chief Complaint   Patient presents with    Pre-op Exam     Eye surgery      Subjective:  Patient is here for preoperative well    Ophthalmology:  Patient recently was having problem with glasses  He 1 4 to be recheck to optometrist   Subsequently was referred to ophthalmologist in [facility]  He was diagnosed to have 1  Cataract 2  Glaucoma and 3  Pterygium both eyes   Their plan is to treat glaucoma with the eye drops at this time as well as do corrective surgery for pterygium and monitor cut rec at this time  Ten years ago he was seen by local eye doctor found to have a red line in the eye  Subsequently recently went to see optometrist of refer to ophthalmologist found to have glaucoma, cataract, pterygium, going force elective surgery  He denies any history of CVA CHF CAD in last 6 months  He denies any URI  He does not have any lose loose teeth  He denies any problem with anesthesia  No blood transfusion  No bleeding tendency     anesthesia local  Pertinent history as underneath    Colorectal cancer:  Patient remains under care of oncologist he recently had extensive test done by [facility] oncologist the reportedly unremarkable as well as ultrasound of abdomen done which was unremarkable  Patient denies any abdominal pain nausea vomiting constipation diarrhea hematemesis melena or bleeding  Hyperlipidemia:  He is off atorvastatin    Total cholesterol 185 HDL 66 triglyceride 67 and   Blood sugar will monitor   PSA per oncologist  Hemochromatosis fairly stable,  History of hemangioma in the liver is stable, history of fat containing ventral abdominal hernia stable,  History of ecchymosis fair,  Chemo induced neuropathy unchanged,    06/15/2021:  CBC unremarkable, CMP unremarkable, LFTs normal, lipid profile with LDL to the target, iron studies not available  Date:?: WBC 3 1 hemoglobin 11 4 , -48 5, CA 15-3 normal 22 4, CA 19-9:  18 9 normal, CEA 4 6 normal, ferritin 423, T3, T4, TSH normal    11/29/2021 LDL was 93 total cholesterol 183 and HDL 68 WBC 3 3, hemoglobin 15 3, , [place] [address] 8 mildly elevated, CA  6 normal, CA 19 921 normal, CEA 4 1 normal, ferritin 368 normal, PSA 0 15 free the normal, total PSA 1 01 normal, blood sugar 138 rest of the CMP normal, iron 197, direct bilirubin 0 40 TIBC 318  phosphorus 3 8 and magnesium 1 6  12/21/21:  Blood sugar 109, rest of the CMP normal, , HDL 66, triglyceride 67  12/28/2021:  WBC 2 8 hemoglobin 14 8 , retic count 1 85, CMP normal except blood glucose 115, iron 213 direct bilirubin 0 3 TIBC 307  phosphorus 3 1 magnesium 1 9,  45 mildly elevated, CA  6 normal, CA  3 normal, CEA 3 5 normal, PSA 0 16 free PSA, total PSA 1 06 normal, ferritin 382, T3 3 12 normal, T4 0 71 mildly low, TSH 1 44 normal,    Imported tests:    12/20/2021:  Ultrasound of abdomen compared with MRI of 07/16/2021:2 small left renal parenchymal simple cyst, abnormal hepatic parenchymal echotexture consistent with hemochromatosis as noted clinically as well as on MRI, lack of redemonstration of hepatic parenchymal hemangioma  04/07/2021:  Colonoscopy:  AVM noted in distal rectum, relative narrowing of the rectosigmoid area probably from previous surgery and radiation, and AVM noted in ascending colon and relative narrowing of the hepatic flexure which appears benign          The following portions of the patient's history were reviewed and updated as appropriate: allergies, current medications, past family history, past medical history, past social history, past surgical history and problem list     Review of Systems   All other systems reviewed and are negative          Historical Information   Patient Active Problem List   Diagnosis    Diarrhea    Hyperglycemia    Hypertension    Leukocytosis    Metastatic colorectal cancer (HCC)    Hypercholesteremia    Bruise of both arms    Hemochromatosis    Vitamin D deficiency    Osteopenia    Bleeding per rectum    Incisional hernia with obstruction but no gangrene    Left inguinal hernia    Right inguinal hernia    Other constipation    Encounter for surgical follow-up care    AV (angiodysplasia malformation of colon)    Edema of right lower leg    Balance disorder    Weakness of both legs    Idiopathic chronic venous hypertension 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 (HCC)    Neuropathy    Pterygium of both eyes    Chemotherapy-induced neutropenia (HCC)    Glaucoma    Age-related cataract     Past Medical History:   Diagnosis Date    Cancer ([address] )     rectal    Heart murmur     High cholesterol     HLD (hyperlipidemia)     border line    Hx of radiation therapy      Past Surgical History:   Procedure Laterality Date    COLONOSCOPY      COLOSTOMY TAKEDOWN/REVERSE ANTON  2018    6 months after    HARTMANS PROCEDURE  2018    IR BALLOON-OCCLUDED ANTEGRADE TRANSVENOUS OBLITERATION (BATO)  5/3/2021    IR PORT REMOVAL 2021    LIVER BIOPSY      in 2017    PORTACATH PLACEMENT      in 2016   [address] HERNIA,5+Y/O,REDUCIBL Left 2021    Procedure: REPAIR HERNIA INGUINAL WITH MESH;  Surgeon: Sejal Ralph MD;  Location: [facility];  Service: General     Social History     Substance and Sexual Activity   Alcohol Use Yes    Comment: occasionally  Social History     Substance and Sexual Activity   Drug Use Never     Social History     Tobacco Use   Smoking Status Former Smoker    Packs/day:     Years: 10 00    Pack years: 10 00    Quit date:     Years since quittin 3   Smokeless Tobacco Never Used   Tobacco Comment    quit 20 years ago     Family History   Problem Relation Age of Onset    No Known Problems Mother     No Known Problems Father      Health Maintenance Due   Topic    Hepatitis C Screening     Pneumococcal Vaccine: 65+ Years (1 of 4 - PCV13)    DTaP,Tdap,and Td Vaccines (1 - Tdap)      Meds/Allergies       Current Outpatient Medications:     CALCIUM MAGNESIUM 750 PO, Take 2 tablets by mouth in the morning, Disp: , Rfl:     capecitabine (XELODA) 500 MG tablet, 500 mg 2 (two) times a day On 2 weeks off 1 week    2 tabs bid, Disp: , Rfl:     Cholecalciferol (VITAMIN D3) 3000 units TABS, Take by mouth, Disp: , Rfl:     Cyanocobalamin (VITAMIN B 12 PO), Take by mouth, Disp: , Rfl:     folic acid (FOLVITE) 1 mg tablet, Take by mouth daily, Disp: , Rfl:     Lumigan 0 01 % ophthalmic drops, , Disp: , Rfl:     Magnesium 400 MG TABS, Take 1 tablet by mouth in the morning, Disp: , Rfl:     Misc Natural Products (TUMERSAID PO), Take 1 tablet by mouth in the morning, Disp: , Rfl:     Multiple Vitamins-Minerals (CENTRUM SILVER PO), Take by mouth, Disp: , Rfl:     potassium chloride (Klor-Con) 10 mEq tablet, potassium chloride ER 10 mEq tablet,extended release, Disp: , Rfl:     polyethylene glycol (MIRALAX) 17 g packet, Take 17 g by mouth daily for 7 days (Patient taking differently: Take 17 g by mouth as needed ), Disp: 119 g, Rfl: 0      Objective:    Vitals:   /60   Pulse 75   Temp 97 5 °F (36 4 °C)   Ht 6' (1 829 m)   Wt 80 3 kg (177 lb)   SpO2 100%   BMI 24 01 kg/m²   Body mass index is 24 01 kg/m²  Vitals:    05/05/22 1131   Weight: 80 3 kg (177 lb)       Physical Exam  Constitutional:       General: He is not in acute distress  Appearance: He is well-developed  He is not ill-appearing or diaphoretic  HENT:      Head: Normocephalic and atraumatic  Right Ear: External ear normal       Left Ear: External ear normal    Eyes:      General: Lids are normal  No scleral icterus  Right eye: No discharge  Left eye: No discharge  Conjunctiva/sclera: Conjunctivae normal    Neck:      Thyroid: No thyroid mass or thyromegaly  Vascular: No carotid bruit or JVD  Trachea: Trachea normal    Cardiovascular:      Rate and Rhythm: Regular rhythm  Heart sounds: Normal heart sounds  Pulmonary:      Effort: No respiratory distress  Breath sounds: Normal breath sounds  No wheezing or rales  Abdominal:      General: Bowel sounds are normal    Musculoskeletal:      Right lower leg: No edema  Left lower leg: No edema  Lymphadenopathy:      Cervical: No cervical adenopathy  Skin:     General: Skin is warm  Coloration: Skin is not jaundiced or pale  Findings: No rash  Neurological:      General: No focal deficit present  Mental Status: He is alert and oriented to person, place, and time  Coordination: Coordination normal    Psychiatric:         Behavior: Behavior normal          Judgment: Judgment normal          Lab Review   No visits with results within 2 Month(s) from this visit     Latest known visit with results is:   Orders Only on 12/21/2021   Component Date Value Ref Range Status    Glucose, Random 12/21/2021 109* 65 - 99 mg/dL Final    BUN 12/21/2021 13  8 - 27 mg/dL Final    Creatinine 12/21/2021 1 09  0 76 - 1 27 mg/dL Final    eGFR Non  12/21/2021 65  >59 mL/min/1 73 Final    eGFR  12/21/2021 75  >59 mL/min/1 73 Final    Comment: **In accordance with recommendations from the NKF-ASN Task force,**    [facility] is in the process of updating its eGFR calculation to the    2021 CKD-EPI creatinine equation that estimates kidney function    without a race variable   SL AMB BUN/CREATININE RATIO 12/21/2021 12  10 - 24 Final    Sodium 12/21/2021 145* 134 - 144 mmol/L Final    Potassium 12/21/2021 4 6  3 5 - 5 2 mmol/L Final    Chloride 12/21/2021 106  96 - 106 mmol/L Final    CO2 12/21/2021 26  20 - 29 mmol/L Final    CALCIUM 12/21/2021 9 7  8 6 - 10 2 mg/dL Final    Protein, Total 12/21/2021 6 8  6 0 - 8 5 g/dL Final    Albumin 12/21/2021 4 2  3 7 - 4 7 g/dL Final    Globulin, Total 12/21/2021 2 6  1 5 - 4 5 g/dL Final    Albumin/Globulin Ratio 12/21/2021 1 6  1 2 - 2 2 Final    TOTAL BILIRUBIN 12/21/2021 0 3  0 0 - 1 2 mg/dL Final    Alk Phos Isoenzymes 12/21/2021 98  44 - 121 IU/L Final                  **Please note reference interval change**    AST 12/21/2021 29  0 - 40 IU/L Final    ALT 12/21/2021 16  0 - 44 IU/L Final    Cholesterol, Total 12/21/2021 185  100 - 199 mg/dL Final    Triglycerides 12/21/2021 67  0 - 149 mg/dL Final    HDL 12/21/2021 66  >39 mg/dL Final    VLDL Cholesterol Calculated 12/21/2021 13  5 - 40 mg/dL Final    LDL Calculated 12/21/2021 106* 0 - 99 mg/dL Final         Patient Instructions   Patient is cleared for upcoming elective eye surgery for pterygium  Call me if there is any question       Dr Mt Oneill MD  [facility]       "This note has been constructed using a voice recognition system  Therefore there may be syntax, spelling, and/or grammatical errors   Please call if you have any questions  "

## 2022-05-05 NOTE — PATIENT INSTRUCTIONS
Patient is cleared for upcoming elective eye surgery for pterygium      Call me if there is any question

## 2022-08-04 ENCOUNTER — OFFICE VISIT (OUTPATIENT)
Dept: INTERNAL MEDICINE CLINIC | Facility: CLINIC | Age: 77
End: 2022-08-04
Payer: COMMERCIAL

## 2022-08-04 VITALS — SYSTOLIC BLOOD PRESSURE: 130 MMHG | DIASTOLIC BLOOD PRESSURE: 80 MMHG

## 2022-08-04 DIAGNOSIS — S40.021S CONTUSION OF BOTH UPPER EXTREMITIES, SEQUELA: ICD-10-CM

## 2022-08-04 DIAGNOSIS — I10 PRIMARY HYPERTENSION: ICD-10-CM

## 2022-08-04 DIAGNOSIS — H11.003 PTERYGIUM OF BOTH EYES: Primary | ICD-10-CM

## 2022-08-04 DIAGNOSIS — E83.119 HEMOCHROMATOSIS, UNSPECIFIED HEMOCHROMATOSIS TYPE: ICD-10-CM

## 2022-08-04 DIAGNOSIS — T45.1X5A CHEMOTHERAPY-INDUCED NEUTROPENIA (HCC): ICD-10-CM

## 2022-08-04 DIAGNOSIS — D70.1 CHEMOTHERAPY-INDUCED NEUTROPENIA (HCC): ICD-10-CM

## 2022-08-04 DIAGNOSIS — G62.9 NEUROPATHY: ICD-10-CM

## 2022-08-04 DIAGNOSIS — H25.013 CORTICAL AGE-RELATED CATARACT OF BOTH EYES: ICD-10-CM

## 2022-08-04 DIAGNOSIS — S40.022S CONTUSION OF BOTH UPPER EXTREMITIES, SEQUELA: ICD-10-CM

## 2022-08-04 PROCEDURE — 3079F DIAST BP 80-89 MM HG: CPT | Performed by: INTERNAL MEDICINE

## 2022-08-04 PROCEDURE — 99214 OFFICE O/P EST MOD 30 MIN: CPT | Performed by: INTERNAL MEDICINE

## 2022-08-04 PROCEDURE — 3075F SYST BP GE 130 - 139MM HG: CPT | Performed by: INTERNAL MEDICINE

## 2022-08-04 PROCEDURE — 1160F RVW MEDS BY RX/DR IN RCRD: CPT | Performed by: INTERNAL MEDICINE

## 2022-08-04 NOTE — PROGRESS NOTES
[facility] Office Visit Note  22     Mahesh Brandon 64 y o  male MRN: [id number]  : 1945    Assessment:     1  Pterygium of both eyes    2  Primary hypertension    3  Neuropathy    4  Hemochromatosis, unspecified hemochromatosis type    5  Chemotherapy-induced neutropenia (HCC)    6  Cortical age-related cataract of both eyes    7  Contusion of both upper extremities, sequela        Discussion Summary and Plan: Today's care plan and medications were reviewed with patient in detail and all their questions answered to their satisfaction  In summary is very successful left eye pterygium surgery  Done on 2022  The being planned for the the right eye on 2022  Preoperative clearance done  We again talked about ecchymosis probably multifactorial   We talked about doing liver special ultrasound to rule out cirrhosis  He will discuss with hemato oncologist     Hemochromatosis is fair  Being monitored by [facility] oncologist   CA of the rectum is stable  Of he has question about Ca consider stopping 1 of the chemotherapy which is being given periodically by [facility],THE oncologist   The surgical oncologist had advised to take only for 5 years  We talked about getting 2nd opinion at [facility] is or how you pan or [place] the he will think about it  We will be available to assist as appropriate per his request     Blood pressure fair  Of a ecchymosis differential diagnosis discussed contributing factor could be hemochromatosis, elevated ferritin, possible alcohol use history, wonder if there is any paraneoplastic syndrome contributing  Hematology oncologist does not have any explanation he does not think there is anything that he can think of the contributing from hematological and not planning any test   Again of we can do advanced testing with blood specialist doctor but will hold of per his request at this time  Generally doing fairly well  Will follow back in 3 months  Preoperative paper filled up and is medically clear to go for the surgery    He had blood test done by [facility],THE oncologist he will get us the copy    Chief Complaint   Patient presents with    Follow-up     Preoperative clearance for pterygium, history of cataract, history of CA of the rectum, ecchymosis, borderline blood pressure, hemochromatosis, home   Pre-op Exam      Subjective:  Chronic disease management     Ophthalmology:  1  Cataract 2  Glaucoma and 3  Pterygium both eyes   Going for surgery for Pterygium on right eye s/p successful left eye surgery 5/25/2022  Cataract surgery in the future     balance: balance better, limited help with therapy, doing home excercise        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:  Unchanged  He does not think he needs any treatment  His alcohol is conception noted as reported by him  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 : stable  Patient very closely being followed by [facility] oncologist   He gets blood test on monthly basis  His WBC remains chronically low and felt to be secondary to chemotherapy  Hemoglobin is stable  Platelet is stable  Appetite is fairly stable  Recent ultrasound reviewed  Osteopenia:  Patient underwent DEXA scan in May 2020  Report as underneath  The examination is reviewed using the World Health Organization criteria  There is osteopenia as measured by the bone mineral density of the femoral neck  1  The lumbar spine has bone mineral density that is in a normal range  2  The hip has bone mineral density of osteopenia with measurements that show   increased risk for fracture  3  A FRAX is not reported because the patient is being treated for osteoporosis  Was taking Prolia for couple of years but not taking not anxious to take it  Recommend to get a repeat DEXA scan    Going for follow-up surveillance DEXA scan in November by oncologist   Indications of the treatments were reviewed    He does not smoke he is a very active person  Recommend to avoid caffeine      Ecchymosis:  Unchanged in the forearm better in arm generally not a major issue    Hemochromatosis:  Donates blood periodically to help hemochromatosis  CBC and ferritin being followed by hemato oncologist  07/16/2021:  MRI of abdomen:  1  High level of liver iron overload, with estimated iron concentration of 196 (micro)mol/g dry weight using MRQuantif software (Nl <36 (micro)mol/g)  No steatosis assessment  No splenic iron overload        2  Four liver hemangiomas as described, grossly unchanged since February 2017      3   Small fat-containing ventral abdominal wall hernia, similar to prior study  03/30/2021:  CT scan of abdomen and pelvis:  1  Multiple enhancing hepatic lesions appear mostly similar to prior MRI study  Repeat MRI as clinically indicated      2   Other findings as described are unchanged  Recently had a blood test done with oncologist he will bring the copy next time   04/07/2021:  Colonoscopy:  1  AVM noted in the distal rectum  2  Relative narrowing of the rectosigmoid area probably from previous surgery and radiation therapy  3  AVM also noted in the ascending colon  4  There is area of relative narrowing in the hepatic flexure area  This area appears benign  Biopsies taken  PSA:  PSA 1-20-22:1 2 done by oncologist office    12/20/2021:  Abdomen ultrasound and compared with MRI of 07/16/2021 of abdomen:  Impression 2 small left renal parenchymal simple cyst, abnormal hepatic parenchymal echotexture consistent with hemochromatosis as noted clinically as well as on MRI, lack of redemonstration of hepatic parenchymal hemangioma  Colorectal cancer:  Patient remains under care of oncologist he recently had extensive test done by [facility] oncologist the reportedly unremarkable as well as ultrasound of abdomen done which was unremarkable    Patient denies any abdominal pain nausea vomiting constipation diarrhea hematemesis melena or bleeding  Hyperlipidemia:  He is off atorvastatin  Total cholesterol 185 HDL 66 triglyceride 67 and   Blood sugar will monitor              06/15/2021:  CBC unremarkable, CMP unremarkable, LFTs normal, lipid profile with LDL to the target, iron studies not available  Date:?: WBC 3 1 hemoglobin 11 4 , -48 5, CA 15-3 normal 22 4, CA 19-9:  18 9 normal, CEA 4 6 normal, ferritin 423, T3, T4, TSH normal    11/29/2021 LDL was 93 total cholesterol 183 and HDL 68 WBC 3 3, hemoglobin 15 3, , [place] [address] 8 mildly elevated, CA  6 normal, CA 19 921 normal, CEA 4 1 normal, ferritin 368 normal, PSA 0 15 free the normal, total PSA 1 01 normal, blood sugar 138 rest of the CMP normal, iron 197, direct bilirubin 0 40 TIBC 318  phosphorus 3 8 and magnesium 1 6  12/21/21:  Blood sugar 109, rest of the CMP normal, , HDL 66, triglyceride 67  12/28/2021:  WBC 2 8 hemoglobin 14 8 , retic count 1 85, CMP normal except blood glucose 115, iron 213 direct bilirubin 0 3 TIBC 307  phosphorus 3 1 magnesium 1 9,  45 mildly elevated, CA  6 normal, CA  3 normal, CEA 3 5 normal, PSA 0 16 free PSA, total PSA 1 06 normal, ferritin 382, T3 3 12 normal, T4 0 71 mildly low, TSH 1 44 normal,        The following portions of the patient's history were reviewed and updated as appropriate: allergies, current medications, past family history, past medical history, past social history, past surgical history and problem list     Review of Systems   All other systems reviewed and are negative          Historical Information   Patient Active Problem List   Diagnosis    Diarrhea    Hyperglycemia    Hypertension    Leukocytosis    Metastatic colorectal cancer (HCC)    Hypercholesteremia    Bruise of both arms    Hemochromatosis    Vitamin D deficiency    Osteopenia    Bleeding per rectum    Incisional hernia with obstruction but no gangrene    Left inguinal hernia    Right inguinal hernia    Other constipation    Encounter for surgical follow-up care    AV (angiodysplasia malformation of colon)    Edema of right lower leg    Balance disorder    Weakness of both legs    Idiopathic chronic venous hypertension 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 (HCC)    Neuropathy    Pterygium of both eyes    Chemotherapy-induced neutropenia (HCC)    Glaucoma    Age-related cataract     Past Medical History:   Diagnosis Date    Cancer ([address] )     rectal    Heart murmur     High cholesterol     HLD (hyperlipidemia)     border line    Hx of radiation therapy      Past Surgical History:   Procedure Laterality Date    COLONOSCOPY      COLOSTOMY TAKEDOWN/REVERSE ANTON  2018    6 months after    HARTMANS PROCEDURE  2018    IR BALLOON-OCCLUDED ANTEGRADE TRANSVENOUS OBLITERATION (BATO)  5/3/2021    IR PORT REMOVAL  2021    LIVER BIOPSY      in 2017    PORTACATH PLACEMENT      in    [address] [facility] HERNIA,5+Y/O,REDUCIBL Left 2021    Procedure: REPAIR HERNIA INGUINAL WITH MESH;  Surgeon: Derrick Romero MD;  Location: [facility];  Service: General     Social History     Substance and Sexual Activity   Alcohol Use Yes    Comment: occasionally        Social History     Substance and Sexual Activity   Drug Use Never     Social History     Tobacco Use   Smoking Status Former Smoker    Packs/day: 1 00    Years: 10 00    Pack years: 10 00    Quit date:     Years since quittin 6   Smokeless Tobacco Never Used   Tobacco Comment    quit 20 years ago     Family History   Problem Relation Age of Onset    No Known Problems Mother     No Known Problems Father      Health Maintenance Due   Topic    Hepatitis C Screening     Pneumococcal Vaccine: 65+ Years (1 - PCV)    Influenza Vaccine (1)      Meds/Allergies       Current Outpatient Medications:     CALCIUM MAGNESIUM 750 PO, Take 2 tablets by mouth in the morning, Disp: , Rfl:     capecitabine (XELODA) 500 MG tablet, 500 mg 2 (two) times a day On 2 weeks off 1 week  2 tabs bid, Disp: , Rfl:     Cholecalciferol (VITAMIN D3) 3000 units TABS, Take by mouth, Disp: , Rfl:     Cyanocobalamin (VITAMIN B 12 PO), Take by mouth, Disp: , Rfl:     folic acid (FOLVITE) 1 mg tablet, Take by mouth daily, Disp: , Rfl:     Lumigan 0 01 % ophthalmic drops, , Disp: , Rfl:     Magnesium 400 MG TABS, Take 1 tablet by mouth in the morning, Disp: , Rfl:     Misc Natural Products (TUMERSAID PO), Take 1 tablet by mouth in the morning, Disp: , Rfl:     Multiple Vitamins-Minerals (CENTRUM SILVER PO), Take by mouth, Disp: , Rfl:     potassium chloride (Klor-Con) 10 mEq tablet, potassium chloride ER 10 mEq tablet,extended release, Disp: , Rfl:     ascorbic acid (VITAMIN C) 1000 MG tablet, Take 1,000 mg by mouth, Disp: , Rfl:     Calcium Carb-Cholecalciferol (Calcium 1000 + D) 1000-800 MG-UNIT TABS, Take 600 mg by mouth 2 (two) times a day, Disp: , Rfl:     polyethylene glycol (MIRALAX) 17 g packet, Take 17 g by mouth daily for 7 days (Patient taking differently: Take 17 g by mouth as needed ), Disp: 119 g, Rfl: 0      Objective:    Vitals:   /80   There is no height or weight on file to calculate BMI  There were no vitals filed for this visit  Physical Exam  Constitutional:       General: He is not in acute distress  Appearance: He is well-developed  He is not ill-appearing or diaphoretic  HENT:      Head: Normocephalic and atraumatic  Right Ear: External ear normal       Left Ear: External ear normal    Eyes:      General: Lids are normal  No scleral icterus  Right eye: No discharge  Left eye: No discharge  Conjunctiva/sclera: Conjunctivae normal    Neck:      Thyroid: No thyroid mass or thyromegaly  Vascular: No carotid bruit or JVD  Trachea: Trachea normal    Cardiovascular:      Rate and Rhythm: Regular rhythm  Heart sounds: Normal heart sounds     Pulmonary:      Effort: No respiratory distress  Breath sounds: Normal breath sounds  No wheezing or rales  Abdominal:      General: Bowel sounds are normal    Musculoskeletal:      Right lower leg: No edema  Left lower leg: No edema  Lymphadenopathy:      Cervical: No cervical adenopathy  Skin:     General: Skin is warm  Coloration: Skin is not jaundiced or pale  Findings: No rash  Comments: Ecchymosis chronic both upper extremity mostly   Neurological:      General: No focal deficit present  Mental Status: He is alert and oriented to person, place, and time  Mental status is at baseline  Coordination: Coordination normal    Psychiatric:         Mood and Affect: Mood normal          Behavior: Behavior normal          Thought Content: Thought content normal          Judgment: Judgment normal          Lab Review   No visits with results within 2 Month(s) from this visit  Latest known visit with results is:   Orders Only on 12/21/2021   Component Date Value Ref Range Status    Glucose, Random 12/21/2021 109 (A) 65 - 99 mg/dL Final    BUN 12/21/2021 13  8 - 27 mg/dL Final    Creatinine 12/21/2021 1 09  0 76 - 1 27 mg/dL Final    eGFR Non  12/21/2021 65  >59 mL/min/1 73 Final    eGFR  12/21/2021 75  >59 mL/min/1 73 Final    Comment: **In accordance with recommendations from the NKF-ASN Task force,**    [facility] is in the process of updating its eGFR calculation to the    2021 CKD-EPI creatinine equation that estimates kidney function    without a race variable        SL AMB BUN/CREATININE RATIO 12/21/2021 12  10 - 24 Final    Sodium 12/21/2021 145 (A) 134 - 144 mmol/L Final    Potassium 12/21/2021 4 6  3 5 - 5 2 mmol/L Final    Chloride 12/21/2021 106  96 - 106 mmol/L Final    CO2 12/21/2021 26  20 - 29 mmol/L Final    CALCIUM 12/21/2021 9 7  8 6 - 10 2 mg/dL Final    Protein, Total 12/21/2021 6 8  6 0 - 8 5 g/dL Final    Albumin 12/21/2021 4 2  3 7 - 4 7 g/dL Final    Globulin, Total 12/21/2021 2 6  1 5 - 4 5 g/dL Final    Albumin/Globulin Ratio 12/21/2021 1 6  1 2 - 2 2 Final    TOTAL BILIRUBIN 12/21/2021 0 3  0 0 - 1 2 mg/dL Final    Alk Phos Isoenzymes 12/21/2021 98  44 - 121 IU/L Final                  **Please note reference interval change**    AST 12/21/2021 29  0 - 40 IU/L Final    ALT 12/21/2021 16  0 - 44 IU/L Final    Cholesterol, Total 12/21/2021 185  100 - 199 mg/dL Final    Triglycerides 12/21/2021 67  0 - 149 mg/dL Final    HDL 12/21/2021 66  >39 mg/dL Final    VLDL Cholesterol Calculated 12/21/2021 13  5 - 40 mg/dL Final    LDL Calculated 12/21/2021 106 (A) 0 - 99 mg/dL Final         Patient Instructions   Clear for upcoming surgery  Follow with Consultants as per their and our suggestion    Follow up in 12 week(s) or as needed earlier    Follow all instructions as advised and discussed  Take your medications as prescribed  Call the office immediately if you experience any side effects  Ask questions if you do not understand  Keep your scheduled appointment as advised or come sooner if necessary or in doubt  Best time to call for non-urgent matter or questions on weekdays is between 9am and 12 noon  See physician for any new symptoms or worsening of current symptoms  Urgent or emergent situations call 911 and report to nearest emergency room  I spent  30 -40 minutes taking care of this patient including clinical care, conseling, collaboration, chart, lab and consultaion review as appropriate    Patient is to get labs 1 week(s) prior to next visit if advised            Dr Srikanth Orellana MD  [facility]       "This note has been constructed using a voice recognition system  Therefore there may be syntax, spelling, and/or grammatical errors   Please call if you have any questions  "

## 2022-08-04 NOTE — PATIENT INSTRUCTIONS
Clear for upcoming surgery  Follow with Consultants as per their and our suggestion    Follow up in 12 week(s) or as needed earlier    Follow all instructions as advised and discussed  Take your medications as prescribed  Call the office immediately if you experience any side effects  Ask questions if you do not understand  Keep your scheduled appointment as advised or come sooner if necessary or in doubt  Best time to call for non-urgent matter or questions on weekdays is between 9am and 12 noon  See physician for any new symptoms or worsening of current symptoms  Urgent or emergent situations call 911 and report to nearest emergency room      I spent  30 -40 minutes taking care of this patient including clinical care, conseling, collaboration, chart, lab and consultaion review as appropriate    Patient is to get labs 1 week(s) prior to next visit if advised

## 2022-10-19 LAB — EXTERNAL SARS COV-2 ANTIBODY IGG: POSITIVE

## 2022-10-27 ENCOUNTER — CLINICAL SUPPORT (OUTPATIENT)
Dept: INTERNAL MEDICINE CLINIC | Facility: CLINIC | Age: 77
End: 2022-10-27
Payer: COMMERCIAL

## 2022-10-27 DIAGNOSIS — Z23 ENCOUNTER FOR IMMUNIZATION: Primary | ICD-10-CM

## 2022-10-27 PROCEDURE — 90662 IIV NO PRSV INCREASED AG IM: CPT | Performed by: INTERNAL MEDICINE

## 2022-10-27 PROCEDURE — G0008 ADMIN INFLUENZA VIRUS VAC: HCPCS | Performed by: INTERNAL MEDICINE

## 2022-11-14 ENCOUNTER — OFFICE VISIT (OUTPATIENT)
Dept: INTERNAL MEDICINE CLINIC | Facility: CLINIC | Age: 77
End: 2022-11-14

## 2022-11-14 VITALS
DIASTOLIC BLOOD PRESSURE: 70 MMHG | SYSTOLIC BLOOD PRESSURE: 130 MMHG | HEIGHT: 72 IN | HEART RATE: 62 BPM | WEIGHT: 172 LBS | BODY MASS INDEX: 23.3 KG/M2 | OXYGEN SATURATION: 99 %

## 2022-11-14 DIAGNOSIS — D69.6 THROMBOCYTOPENIA (HCC): ICD-10-CM

## 2022-11-14 DIAGNOSIS — H25.013 CORTICAL AGE-RELATED CATARACT OF BOTH EYES: ICD-10-CM

## 2022-11-14 DIAGNOSIS — G62.9 NEUROPATHY: ICD-10-CM

## 2022-11-14 DIAGNOSIS — D70.1 CHEMOTHERAPY-INDUCED NEUTROPENIA (HCC): Primary | ICD-10-CM

## 2022-11-14 DIAGNOSIS — E55.9 VITAMIN D DEFICIENCY: ICD-10-CM

## 2022-11-14 DIAGNOSIS — E78.00 HYPERCHOLESTEREMIA: ICD-10-CM

## 2022-11-14 DIAGNOSIS — T45.1X5A CHEMOTHERAPY-INDUCED NEUTROPENIA (HCC): Primary | ICD-10-CM

## 2022-11-14 DIAGNOSIS — R73.9 HYPERGLYCEMIA: ICD-10-CM

## 2022-11-14 DIAGNOSIS — I10 PRIMARY HYPERTENSION: ICD-10-CM

## 2022-11-14 DIAGNOSIS — R26.89 BALANCE DISORDER: ICD-10-CM

## 2022-11-14 DIAGNOSIS — Z13.1 DIABETES MELLITUS SCREENING: ICD-10-CM

## 2022-11-14 DIAGNOSIS — C22.9 MALIGNANT NEOPLASM OF LIVER, UNSPECIFIED LIVER MALIGNANCY TYPE (HCC): ICD-10-CM

## 2022-11-14 DIAGNOSIS — E83.119 HEMOCHROMATOSIS, UNSPECIFIED HEMOCHROMATOSIS TYPE: ICD-10-CM

## 2022-11-14 PROBLEM — D72.829 LEUKOCYTOSIS: Status: RESOLVED | Noted: 2017-08-12 | Resolved: 2022-11-14

## 2022-11-14 NOTE — ASSESSMENT & PLAN NOTE
12-20-21 US, 7-16-21 MRI abdomen, 3-30-21, CT of abdomen reviewed    7-: Ferritin 457, WBC 3,000, Hb 12 4,  53, , Blood sugar 138, Iron saturation percentage 79%, rest cmp wnl    Recently underwent ultrasound of upper abdomen  We will review report when available  He did skip blood donation which is doing monthly recently          Patient to go for follow-up CBC iron iron saturation ferritin in near future per Oncology

## 2022-11-14 NOTE — ASSESSMENT & PLAN NOTE
Generally seems to be doing fairly well  Recently underwent ultrasound of abdomen will review report  But he was told it is doing fairly well  No clinical signs symptoms of any reoccurrence of malignancy      How remains on chemotherapy per oncologist a XELODA 500 mg 1 tablet twice a day

## 2022-11-14 NOTE — PATIENT INSTRUCTIONS
Get a new set of ferritin, CBC, iron saturation with your hemato oncologist and discuss report with them  Continue blood donation monthly per their guidance  Of consider getting CMP hemoglobin A1c lipid profile and hemoglobin at your convenience  Follow with Consultants as per their and our suggestion    Follow up in 12 week(s) or as needed earlier    Follow all instructions as advised and discussed  Take your medications as prescribed  Call the office immediately if you experience any side effects  Ask questions if you do not understand  Keep your scheduled appointment as advised or come sooner if necessary or in doubt  Best time to call for non-urgent matter or questions on weekdays is between 9am and 12 noon  See physician for any new symptoms or worsening of current symptoms  Urgent or emergent situations call 911 and report to nearest emergency room      I spent  30 -40 minutes taking care of this patient including clinical care, conseling, collaboration, chart, lab and consultaion review as appropriate

## 2022-11-14 NOTE — PROGRESS NOTES
Name: Dayna Nguyen      : 1945      MRN: [id number]  Encounter Provider: Damon Valentin MD  Encounter Date: 2022   Encounter department: [address]     1  Chemotherapy-induced neutropenia (HCC)  Assessment & Plan:  2022: WBC 3000, , Hb 12 4    Patient to go for follow-up CBC iron iron saturation ferritin in near future per Oncology    Orders:  -     Comprehensive metabolic panel; Future; Expected date: 2022    2  Thrombocytopenia ([address] )  Assessment & Plan:  2022: WBC 3000, , Hb 12 4    Patient to go for follow-up CBC iron iron saturation ferritin in near future per Oncology      3  Hemochromatosis, unspecified hemochromatosis type  Assessment & Plan:  21 US, 21 MRI abdomen, 3-30-21, CT of abdomen reviewed    2022: Ferritin 457, WBC 3,000, Hb 12 4,  53, , Blood sugar 138, Iron saturation percentage 79%, rest cmp wnl    Recently underwent ultrasound of upper abdomen  We will review report when available  He did skip blood donation which is doing monthly recently  Patient to go for follow-up CBC iron iron saturation ferritin in near future per Oncology    Orders:  -     Comprehensive metabolic panel; Future; Expected date: 2022  -     Hemoglobin A1C; Future; Expected date: 2022  -     Lipid panel; Future; Expected date: 2022    4  Hypercholesteremia  Assessment & Plan:  Low cholesterol diet    Will check lipid profile    Orders:  -     Comprehensive metabolic panel; Future; Expected date: 2022  -     Lipid panel; Future; Expected date: 2022    5  Hyperglycemia  Assessment & Plan:  2022: Blood sugar 138    Will check blood sugar and hba1c with next blood test      rec carb control diet    Orders:  -     Hemoglobin A1C; Future; Expected date: 2022    6  Vitamin D deficiency    7   Malignant neoplasm of liver, unspecified liver malignancy type [facility])  Assessment & Plan:  Generally seems to be doing fairly well  Recently underwent ultrasound of abdomen will review report  But he was told it is doing fairly well  No clinical signs symptoms of any reoccurrence of malignancy  How remains on chemotherapy per oncologist a XELODA 500 mg 1 tablet twice a day    Orders:  -     Comprehensive metabolic panel; Future; Expected date: 11/28/2022    8  Diabetes mellitus screening  -     Hemoglobin A1C; Future; Expected date: 11/28/2022    9  Primary hypertension  Assessment & Plan:  Blood pressure is perfect today  I am sure blood pressure is even better at home  Not on any medication      10  Neuropathy  Assessment & Plan:  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  No need of treatment  He is aware that will be more than happy to help if he should be more symptomatic and it interferes with life or lifestyle      11  Cortical age-related cataract of both eyes  Assessment & Plan: To be seen by ophthalmologist for cataract surgery  Underwent surgery for pterygium bilaterally      12  Balance disorder  Assessment & Plan:  Balance fair             Subjective     Chronic disease management     Pt underwent Pterygium surgery on both eyes , left first and right second( august )  Pt to go for cataract after December 8th visit  Pt had ultrasound of abdomen in October 26th, 2022, Pt also had blood test done with Dr Sami Whipple in last 3 months  We did not get any reports yet    Ca of rectum with history of metastasis liver: no abdominal , nausea, fever , chills , 5 lbs weight loss- atributes to exercise and yard work  He had labs and US done with hematooncologist, currently on ceptibine( zeluta )  BM is fair, diet related, between hard and soft  Up to date with colonoscopy    Alcohol consumption: last 2 weeks  He had not drinking any wine  Lately less than 7 drinks a week      Hematochromatosis: ferritin high iron saturation high in 7-  He was seen by Adrianna Farrar on October 2022  He had hct done last month and did not qualify for the blood test   Pt skipped blood donation recently  Usually monthly,      balance: same, fair , not worse  Neuropathy: in feet and hands same, not worse, can not walk barefeet      Osteopenia:  Patient underwent DEXA scan in May 2020  Report as underneath  The examination is reviewed using the World Health Organization criteria  There is osteopenia as measured by the bone mineral density of the femoral neck  1  The lumbar spine has bone mineral density that is in a normal range  2  The hip has bone mineral density of osteopenia with measurements that show   increased risk for fracture  3  A FRAX is not reported because the patient is being treated for osteoporosis  Off prolia, going for dexa 2023 ( insurance will not pay this year        Ecchymosis:  Unchanged in the forearm better in arm generally not a major issue       Previous important studies   07/16/2021:  MRI of abdomen:1  High level of liver iron overload, with estimated iron concentration of 196 (micro)mol/g dry weight using MRQuantif software (Nl <36 (micro)mol/g)  No steatosis assessment  No splenic iron overload      2   Four liver hemangiomas as described, grossly unchanged since February 2017    3   Small fat-containing ventral abdominal wall hernia, similar to prior study  03/30/2021:  CT scan of abdomen and pelvis:  1  Multiple enhancing hepatic lesions appear mostly similar to prior MRI study  Repeat MRI as clinically indicated    2   Other findings as described are unchanged  04/07/2021:  Colonoscopy:  1  AVM noted in the distal rectum  2  Relative narrowing of the rectosigmoid area probably from previous surgery and radiation therapy  3  AVM also noted in the ascending colon  4  There is area of relative narrowing in the hepatic flexure area  This area appears benign  Biopsies taken          PSA:  PSA 1-20-22:1 2 done by oncologist office    12/20/2021:  Abdomen ultrasound and compared with MRI of 07/16/2021 of abdomen:  Impression 2 small left renal parenchymal simple cyst, abnormal hepatic parenchymal echotexture consistent with hemochromatosis as noted clinically as well as on MRI, lack of redemonstration of hepatic parenchymal hemangioma  Colorectal cancer:  Patient remains under care of oncologist he recently had extensive test done by [facility] the reportedly unremarkable as well as ultrasound of abdomen done which was unremarkable  Patient denies any abdominal pain nausea vomiting constipation diarrhea hematemesis melena or bleeding  Hyperlipidemia:  He is off atorvastatin    Total cholesterol 185 HDL 66 triglyceride 67 and   Blood sugar will monitor        06/15/2021:  CBC unremarkable, CMP unremarkable, LFTs normal, lipid profile with LDL to the target, iron studies not available  Date:?: WBC 3 1 hemoglobin 11 4 , -48 5, CA 15-3 normal 22 4, CA 19-9:  18 9 normal, CEA 4 6 normal, ferritin 423, T3, T4, TSH normal    11/29/2021 LDL was 93 total cholesterol 183 and HDL 68 WBC 3 3, hemoglobin 15 3, , [place] [address] 8 mildly elevated, CA  6 normal, CA 19 921 normal, CEA 4 1 normal, ferritin 368 normal, PSA 0 15 free the normal, total PSA 1 01 normal, blood sugar 138 rest of the CMP normal, iron 197, direct bilirubin 0 40 TIBC 318  phosphorus 3 8 and magnesium 1 6  12/21/21:  Blood sugar 109, rest of the CMP normal, , HDL 66, triglyceride 67  12/28/2021:  WBC 2 8 hemoglobin 14 8 , retic count 1 85, CMP normal except blood glucose 115, iron 213 direct bilirubin 0 3 TIBC 307  phosphorus 3 1 magnesium 1 9,  45 mildly elevated, CA  6 normal, CA  3 normal, CEA 3 5 normal, PSA 0 16 free PSA, total PSA 1 06 normal, ferritin 382, T3 3 12 normal, T4 0 71 mildly low, TSH 1 44 normal,  7-: Ferritin 457, WBC 3,000, Hb 12 4,  53, , Blood sugar 138, Iron saturation percentage 79%, rest cmp wnl          Review of Systems   Constitutional: Negative for chills, fatigue, fever and unexpected weight change  HENT: Negative for ear pain, postnasal drip, sinus pain and sore throat  Eyes: Negative for pain and redness  Respiratory: Negative for cough and shortness of breath  Cardiovascular: Negative for chest pain, palpitations and leg swelling  Gastrointestinal: Negative for abdominal pain, diarrhea and nausea  Endocrine: Negative for cold intolerance, polydipsia and polyuria  Genitourinary: Negative for dysuria, frequency and urgency  Musculoskeletal: Negative for arthralgias, gait problem and myalgias  Skin: Negative for rash  Allergic/Immunologic: Negative  Neurological: Negative for dizziness and headaches  Hematological: Negative for adenopathy  Psychiatric/Behavioral: Negative for behavioral problems         Past Medical History:   Diagnosis Date   • Cancer [address] [address] )     rectal   • Heart murmur    • High cholesterol    • HLD (hyperlipidemia)     border line   • Hx of radiation therapy      Past Surgical History:   Procedure Laterality Date   • COLONOSCOPY     • COLOSTOMY TAKEDOWN/REVERSE ANTON  2018    6 months after   • HARTMANS PROCEDURE  2018   • IR BALLOON-OCCLUDED ANTEGRADE TRANSVENOUS OBLITERATION (BATO)  5/3/2021   • IR PORT REMOVAL  5/7/2021   • LIVER BIOPSY      in 5/2017   • PORTACATH PLACEMENT      in 2016   • WY REPAIR ING HERNIA,5+Y/O,REDUCIBL Left 5/4/2021    Procedure: REPAIR HERNIA INGUINAL WITH MESH;  Surgeon: Rolly Sanchez MD;  Location: [facility];  Service: General     Family History   Problem Relation Age of Onset   • No Known Problems Mother    • No Known Problems Father      Social History     Socioeconomic History   • Marital status: /Civil Union     Spouse name: None   • Number of children: None   • Years of education: None   • Highest education level: None   Occupational History   • None Tobacco Use   • Smoking status: Former Smoker     Packs/day: 1 00     Years: 10 00     Pack years: 10 00     Quit date:      Years since quittin 8   • Smokeless tobacco: Never Used   • Tobacco comment: quit 20 years ago   Vaping Use   • Vaping Use: Never used   Substance and Sexual Activity   • Alcohol use: Yes     Comment: occasionally  • Drug use: Never   • Sexual activity: None   Other Topics Concern   • None   Social History Narrative    Lives with wife  Social Determinants of Health     Financial Resource Strain: Not on file   Food Insecurity: Not on file   Transportation Needs: Not on file   Physical Activity: Not on file   Stress: Not on file   Social Connections: Not on file   Intimate Partner Violence: Not on file   Housing Stability: Not on file     Current Outpatient Medications on File Prior to Visit   Medication Sig   • ascorbic acid (VITAMIN C) 1000 MG tablet Take 1,000 mg by mouth   • Calcium Carb-Cholecalciferol (Calcium 1000 + D) 1000-800 MG-UNIT TABS Take 600 mg by mouth 2 (two) times a day   • CALCIUM MAGNESIUM 750 PO Take 2 tablets by mouth in the morning   • capecitabine (XELODA) 500 MG tablet 500 mg 2 (two) times a day On 2 weeks off 1 week    2 tabs bid   • Cholecalciferol (VITAMIN D3) 3000 units TABS Take by mouth   • Cyanocobalamin (VITAMIN B 12 PO) Take by mouth   • folic acid (FOLVITE) 1 mg tablet Take by mouth daily   • Lumigan 0 01 % ophthalmic drops    • Magnesium 400 MG TABS Take 1 tablet by mouth in the morning   • Multiple Vitamins-Minerals (CENTRUM SILVER PO) Take by mouth   • polyethylene glycol (MIRALAX) 17 g packet Take 17 g by mouth daily for 7 days (Patient taking differently: Take 17 g by mouth as needed)   • potassium chloride (Klor-Con) 10 mEq tablet potassium chloride ER 10 mEq tablet,extended release   • [DISCONTINUED] Misc Natural Products (TUMERSAID PO) Take 1 tablet by mouth in the morning     No Known Allergies  Immunization History   Administered Date(s) Administered   • COVID-19 MODERNA VACC 0 5 ML IM 01/21/2021, 02/19/2021, 10/25/2021   • COVID-19, unspecified 04/25/2022   • INFLUENZA 10/22/2017   • Influenza, high dose seasonal 0 7 mL 10/20/2020, 11/15/2021, 10/27/2022   • Influenza, injectable, quadrivalent, preservative free 0 5 mL 10/31/2019       Objective     /70   Pulse 62   Ht 6' (1 829 m)   Wt 78 kg (172 lb)   SpO2 99%   BMI 23 33 kg/m²     Physical Exam  Constitutional:       General: He is not in acute distress  Appearance: He is well-developed  He is not ill-appearing or diaphoretic  HENT:      Head: Normocephalic and atraumatic  Right Ear: External ear normal       Left Ear: External ear normal    Eyes:      General: Lids are normal  No scleral icterus  Right eye: No discharge  Left eye: No discharge  Conjunctiva/sclera: Conjunctivae normal    Neck:      Thyroid: No thyroid mass or thyromegaly  Vascular: No carotid bruit or JVD  Trachea: Trachea normal    Cardiovascular:      Rate and Rhythm: Regular rhythm  Heart sounds: Normal heart sounds  Pulmonary:      Effort: No respiratory distress  Breath sounds: Normal breath sounds  No wheezing or rales  Abdominal:      General: Abdomen is flat  Palpations: There is no shifting dullness, fluid wave, hepatomegaly, splenomegaly or mass  Musculoskeletal:      Right lower leg: No edema  Left lower leg: No edema  Lymphadenopathy:      Cervical: No cervical adenopathy  Skin:     General: Skin is warm  Coloration: Skin is not jaundiced or pale  Findings: No rash  Neurological:      Mental Status: He is alert        Coordination: Coordination normal    Psychiatric:         Behavior: Behavior normal          Judgment: Judgment normal        Peri Rivera MD

## 2022-11-14 NOTE — ASSESSMENT & PLAN NOTE
7-: WBC 3000, , Hb 12 4    Patient to go for follow-up CBC iron iron saturation ferritin in near future per Oncology

## 2022-11-14 NOTE — ASSESSMENT & PLAN NOTE
Blood pressure is perfect today  I am sure blood pressure is even better at home    Not on any medication

## 2022-11-14 NOTE — ASSESSMENT & PLAN NOTE
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

## 2022-11-14 NOTE — ASSESSMENT & PLAN NOTE
Last DEXA scan done on 11/09/2020  Due for another DEXA scan insurance company will approve next year  Remains of Prolia at this time

## 2022-11-14 NOTE — ASSESSMENT & PLAN NOTE
7-: Blood sugar 138    Will check blood sugar and hba1c with next blood test      rec carb control diet

## 2022-12-07 NOTE — TELEPHONE ENCOUNTER
[facility] Medicine Service Consult Note.     Physician requesting consult: Navin Londono MD  Thank you, Navin Chowdhury MD, for asking the [facility] Medicine Service to see Ashutosh Vieira in consultation.    Assessment/Recommendations   Assessment/Plan: Ashutosh is a 63 year old male status post right direct anterior total hip arthroplasty on 12/7/2022.  Hospital medicine service consulted for hypertension.    Resistant hypertension  Resume PTA medication: Amlodipine 10 mg daily, hydrochlorothiazide 25 mg daily, losartan 100 mg daily, metoprolol 25 mg daily.  order creatinine and potassium level for AM  Hold parameters written, as patient is high risk of post operative hypotension.      Diabetes mellitus type 2  Hemoglobin A1c 7.2 on 12/1/2022.  Goal blood glucose levels less than 180 mg/dL.  Hold glipizide 5 mg twice daily  Resume metformin 1000 mg twice daily  Order insulin NovoLog medium correction scale 4 times daily AC at bedtime.    Hypercholesterolemia  PTA medication: Resume simvastatin home dose.    Class I obesity Body mass index is 33.36 kg/m .  Obstructive sleep apnea  Resume home CPAP.    Code status:No Order  -Reviewed the patient's preoperative H and P and updated missing elements.  -Home medication reconciliation has been reviewed.      History of Present Illness/Subjective    HPI: Mr. Ashutosh Vieira is a 63 year old male status post Procedure(s):  RIGHT DIRECT ANTERIOR TOTAL HIP ARTHROPLASTY  Post-operative Day: Day of Surgery, hospital medicine was consulted for hypertension.    HTN. The patient had this problem for greater than 1 years.  Perioperative blood pressure was measured postoperatively at 122/64 mmHg.   Associated symptoms as none. They don't occur after anything.   Not taking blood pressure medicine makes it worse. Taking blood pressure medicine makes it better.     Patient denies cerebrovascular accident, myocardial infarction, pulmonary embolism, or deep  Patient called  He requested a copy of his colon report be faxed to Dr Regine Alberto  I faxed copy  Patient aware  Educated   Thank you venous thrombosis.     Estimated Blood Loss:  400 mL    I have reviewed preop physical including past medical hx, family hx, social hx, surgical hx, medication hx.      Physical Examination  Review of Systems   VITALS: /64   Pulse 61   Temp 97.5  F (36.4  C) (Temporal)   Resp 20   Ht 1.829 m (6')   Wt 111.6 kg (246 lb)   SpO2 97%   BMI 33.36 kg/m    BMI: Body mass index is 33.36 kg/m .  Wt Readings from Last 3 Encounters:   12/07/22 111.6 kg (246 lb)   12/01/22 110.7 kg (244 lb 1.6 oz)   09/08/22 111.9 kg (246 lb 12.8 oz)       Intake/Output Summary (Last 24 hours) at 12/7/2022 1334  Last data filed at 12/7/2022 1245  Gross per 24 hour   Intake 100 ml   Output 400 ml   Net -300 ml     General Appearance:   no acute distress.   ENT/Mouth: membranes moist, no oral lesions or bleeding gums.      EYES:  no scleral icterus, normal conjunctivae   Neck: no carotid bruits or thyromegaly   Chest/Lungs:   lungs are clear to auscultation, no rales or wheezing, equal chest wall expansion    Cardiovascular:   Regular. Normal S1/S2 heart sounds with no murmurs, rubs, or gallops.   Abdomen:  no organomegaly, masses, bruits, or tenderness; bowel sounds are present   Extremities: no cyanosis or clubbing   Skin: no xanthelasma, warm.    Neurologic: normal  bilateral, no tremors     Psychiatric: alert and oriented x3, calm     A 12 point comprehensive review of systems was negative except as noted above.         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     Priority: Medium     Tinnitus 12/28/2016     Priority: Medium     Essential Hypertension      Priority: Medium     Created by Conversion  Replacement Utility updated for latest IMO load         Allergic  Rhinitis      Priority: Medium     Created by Conversion  Replacement Utility updated for latest IMO load         Diabetes mellitus (H) 05/29/2015     Priority: Medium     Essential Hypercholesterolemia      Priority: Medium     Created by Conversion         Obesity      Priority: Medium     Created by Conversion         Obstructive Sleep Apnea      Priority: Medium     Created by Conversion         Benign Adenomatous Polyp Of The Large Intestine      Priority: Medium     Created by Conversion        Past Surgical History:   Procedure Laterality Date     ORTHOPEDIC SURGERY Left     hip     Reviewed, and family history includes Melanoma in his mother; No Known Problems in his brother.     Reviewed, and he  reports that he has quit smoking. His smoking use included cigars, cigarillos or filtered cigars. He has never used smokeless tobacco. He reports current alcohol use. He reports that he does not use drugs.  Social History     Tobacco Use     Smoking status: Former     Types: Cigars, cigarillos or filtered cigars     Smokeless tobacco: Never     Tobacco comments:     cigars   Substance Use Topics     Alcohol use: Yes     Comment: Alcoholic Drinks/day: weekends        Medications  Allergies   Medications Prior to Admission   Medication Sig Dispense Refill Last Dose     amLODIPine (NORVASC) 10 MG tablet Take 1 tablet (10 mg) by mouth daily 90 tablet 3 12/7/2022 at 0530     glipiZIDE (GLUCOTROL) 5 MG tablet Take 1 tablet (5 mg) by mouth 2 times daily (before meals) 180 tablet 3 12/6/2022     hydrochlorothiazide (HYDRODIURIL) 25 MG tablet Take 1 tablet (25 mg) by mouth daily 90 tablet 3 12/6/2022     losartan (COZAAR) 100 MG tablet Take 1 tablet (100 mg) by mouth daily 90 tablet 3 12/6/2022     metFORMIN (GLUCOPHAGE) 1000 MG tablet Take 1 tablet (1,000 mg) by mouth 2 times daily (with meals) 180 tablet 3 12/6/2022     metoprolol succinate ER (TOPROL XL) 25 MG 24 hr tablet Take 1 tablet (25 mg) by mouth daily 90 tablet 3  12/7/2022 at 0530     simvastatin (ZOCOR) 80 MG tablet Take 1 tablet (80 mg) by mouth every evening 90 tablet 3 12/6/2022     ACCU-CHEK SMARTVIEW TEST STRIP strips [ACCU-CHEK SMARTVIEW TEST STRIP STRIPS] USE 1 STRIP TO CHECK GLUCOSE TWICE DAILY 200 strip 3      lancets (ACCU-CHEK MULTICLIX LANCET) Misc [LANCETS (ACCU-CHEK MULTICLIX LANCET) MISC] Use 100 each As Directed 2 (two) times a day. 200 each 0        Allergies   Allergen Reactions     Lisinopril Cough         Cardiographics Reviewed Personally By Myself   EKG Results: personally reviewed. Sinus rhythm, 1st degree A-V block    Imaging Reviewed Personally By Myself    Radiology Results:   Recent Results (from the past 24 hour(s))   XR Pelvis w Hip Port Right 1 View    Narrative    EXAM: XR PELVIS AND HIP PORTABLE RIGHT 1 VIEW  LOCATION: [facility]  DATE/TIME: 12/7/2022 1:04 PM    INDICATION: Status post hip surgery.  COMPARISON: None.      Impression    IMPRESSION: Postop changes of a right total hip arthroplasty. No fracture. Prior left total hip arthroplasty with some heterotopic ossification around the left hip.       Labs Reviewed Personally By Myself     Most Recent 3 CBC's:Recent Labs   Lab Test 12/01/22  1225 09/10/20  1037 06/30/20  0845   WBC 6.6 6.6 6.1   HGB 14.3 15.5 15.3   MCV 89 90 93    272 269     Most Recent 3 BMP's:Recent Labs   Lab Test 12/07/22  1316 12/07/22  0917 12/01/22  1225 09/08/22  1750 09/08/22  1750 07/01/21  1725   NA  --   --  145  --  141 142   POTASSIUM  --   --  3.7  --  3.4 3.5   CHLORIDE  --   --  105  --  100 103   CO2  --   --  27  --  31* 25   BUN  --   --  19.7  --  32.0* 12   CR  --   --  0.78  --  1.07 0.79   ANIONGAP  --   --  13  --  10 14   RUSS  --   --  9.0  --  9.4 9.0   * 154* 122*   < > 90 102    < > = values in this interval not displayed.     Most Recent 2 LFT's:Recent Labs   Lab Test 12/01/22  1225 01/24/19  1428   AST 21 19   ALT 19 22   ALKPHOS 72 56   BILITOTAL 0.4  0.6     Most Recent Hemoglobin A1c:Recent Labs   Lab Test 12/01/22  1225   A1C 7.2*     Thank you for this consultation.  Appreciate the opportunity to participate in the care of Ashutosh Vieira, please feel free to contact us for any questions or concerns.    Chay Skinner DO, MS  [facility] Service  Internal Medicine  Phone: #[phone]

## 2023-02-12 PROBLEM — H25.10 NUCLEAR SCLEROTIC CATARACT: Status: ACTIVE | Noted: 2022-12-28

## 2023-02-12 PROBLEM — H40.1132 PRIMARY OPEN ANGLE GLAUCOMA OF BOTH EYES, MODERATE STAGE: Status: ACTIVE | Noted: 2022-12-28

## 2023-02-12 NOTE — PROGRESS NOTES
[facility] Visit Note  23     Robert Maldonado 76 y o  male MRN: [id number]  : 1945    Assessment:     1  Metastatic colorectal cancer [facility])  Assessment & Plan:  See malignant neoplas o f liver      2  Malignant neoplasm of liver, unspecified liver malignancy type [facility])  Assessment & Plan:  Under care of Onco  No major oncological issue  3  Neuropathy  Assessment & Plan:  Mild, unchanged,     No meds per him for now      4  Hypercholesteremia  Assessment & Plan:  22: Chol 184, tg 76 hdl 71 ldl 98  Hba1c 6 0    Low cholesterol diet      5  Thrombocytopenia ([address] )  Assessment & Plan:  2022: WBC 3000, , Hb 12 4  2022:   10-:   22       6  Hemochromatosis, unspecified hemochromatosis type  Assessment & Plan:  22: wbc 2 6 hb 11 8   alp 64 iron 207 tibcc 315 ferritin 393 7 soluble transferrin recept 17 7 Folic acid 90 6 O83 986 Vit D 58 iron sat 66% CEA 4 0  42 3 CA 15-3  gfr 74     10- : WBC 3 2  HB 12 3    ldh 10, iron 181 tibc 313 ferritin 379  TSH 1 67 free t3 3 2 free t4  85  CEA 4 8 ca 125 43 9 ca 19-9 19 6 ca 15-3 25 4      2022: wbc 2 9  bh 12 4     ldh 33, iron 234 tibc 311 ferritin 361       2022: wbc 3 0  hb 12 4  plt 133   iron 234 tibc 281 ferritin 635 folic acid 24 J33 505 vit d 50 cea 3 6 ca 125 53 6 ca 19-9 17 9 ca 15-3 27 8      7  Chemotherapy-induced neutropenia (HCC)  Assessment & Plan:  2022: WBC 3000, , Hb 12 4  2022: wbc 2 9  10-: wbc 3 2  22: wbc 2 6    better      8  Cortical age-related cataract of both eyes  Assessment & Plan:  Going for cataract in April    Will do paperwork then      9   Primary hypertension  Assessment & Plan:  22: Chol 184, tg 76 hdl 71 ldl 98  Hba1c 6 0    Elevated BP today, home bp is usually 120 range at home  Low salt diet  No alcohol  Check Blood Pressure daily, call me if remains more than 344 and diastolic more than 90      10  Edema of right lower leg  Assessment & Plan: For stocking      11  Hyperglycemia  Assessment & Plan:  11-22-22:   10-:   8-:   7-:     11-22-22: Chol 184, tg 76 hdl 71 ldl 98  Hba1c 6 0      12  Idiopathic chronic venous hypertension 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 (HCC)  Assessment & Plan:  No open area  , Wears stocking      13  Hereditary hemochromatosis [facility])  Assessment & Plan:  11-22-22: wbc 2 6 hb 11 8   alp 64 iron 207 tibcc 315 ferritin 393 7 soluble transferrin recept 53 7 Folic acid 70 6 F35 799 Vit D 58 iron sat 66% CEA 4 0  42 3 CA 15-3  gfr 74     10- : WBC 3 2  HB 12 3    ldh 10, iron 181 tibc 313 ferritin 379  TSH 1 67 free t3 3 2 free t4  85  CEA 4 8 ca 125 43 9 ca 19-9 19 6 ca 15-3 25 4      8-: wbc 2 9  bh 12 4     ldh 33, iron 234 tibc 311 ferritin 361       7-: wbc 3 0  hb 12 4  plt 133   iron 234 tibc 804 ferritin 788 folic acid 24 B63 649 vit d 50 cea 3 6 ca 125 53 6 ca 19-9 17 9 ca 15-3 27 8      14  Elevated blood pressure reading  Assessment & Plan:  Rec; non pharmacological approach    Low salt diet  No alcohol  Check Blood Pressure daily, call me if remains more than 429 and diastolic more than 90            Discussion Summary and Plan: Today's care plan and medications were reviewed with patient in detail and all their questions answered to their satisfaction      Chief Complaint   Patient presents with   • Follow-up      Subjective:  Chronic disease management      HLD:11-22-22: Chol 184, tg 68 hdl 70 ldl 98  Hba1c 6 0  Ca of rectum with hx of mets: Remains on chemotherapy per oncologist no GI symptoms  Hemochromatosis:see below  Osteopenia CAT scan done in 2020 due for DEXA scan in 2023  Glucose Intolernce:11-22-22: Chol 184, tg 76 hdl 71 ldl 98  Hba1c 6 0 chronic attributes to his sweet tooth we will monitor carbohydrate  Low plt: 130-150 range baseline  Mild anemia- 11-13 range: Baseline    Alcohol consumption  S/p cataraNeuropathy: in feet and hands same, not worse, can not walk barefeet    Ecchymosis:  Unchanged in the forearm better in arm generally not a major issue  Elevated home blood pressure is usually well controlled he will restart checking blood pressures stay on low-salt diet recommend alcohol we will check back in 6 weeks  Going for cataract surgery x2 in April will do preop clearance at that point         Previous important studies   07/16/2021:  MRI of abdomen:1  High level of liver iron overload, with estimated iron concentration of 196 (micro)mol/g dry weight using MRQuantif software (Nl <36 (micro)mol/g)  No steatosis assessment  No splenic iron overload      2   Four liver hemangiomas as described, grossly unchanged since February 2017    3   Small fat-containing ventral abdominal wall hernia, similar to prior study  03/30/2021:  CT scan of abdomen and pelvis:  1  Multiple enhancing hepatic lesions appear mostly similar to prior MRI study  Repeat MRI as clinically indicated    2   Other findings as described are unchanged  04/07/2021:  Colonoscopy:  1  AVM noted in the distal rectum  2  Relative narrowing of the rectosigmoid area probably from previous surgery and radiation therapy  3  AVM also noted in the ascending colon  4  There is area of relative narrowing in the hepatic flexure area  This area appears benign  Biopsies taken  PSA:  PSA 1-20-22:1 2 done by oncologist office    12/20/2021:  Abdomen ultrasound and compared with MRI of 07/16/2021 of abdomen:  Impression 2 small left renal parenchymal simple cyst, abnormal hepatic parenchymal echotexture consistent with hemochromatosis as noted clinically as well as on MRI, lack of redemonstration of hepatic parenchymal hemangioma      Colorectal cancer:  Patient remains under care of oncologist he recently had [facility] the reportedly unremarkable as well as ultrasound of abdomen done which was unremarkable  Patient denies any abdominal pain nausea vomiting constipation diarrhea hematemesis melena or bleeding  Hyperlipidemia:  He is off atorvastatin  11-22-22: Chol 184, tg 76 hdl 71 ldl 98  Hba1c 6 0  Blood sugar will monitor Hba1c 6 0       12/28/2021:  WBC 2 8 hemoglobin 14 8 , retic count 1 85, CMP normal except blood glucose 115, iron 213 direct bilirubin 0 3 TIBC 307  phosphorus 3 1 magnesium 1 9,  45 mildly elevated, CA  6 normal, CA  3 normal, CEA 3 5 normal, PSA 0 16 free PSA, total PSA 1 06 normal, ferritin 382, T3 3 12 normal, T4 0 71 mildly low, TSH 1 44 normal,    11-22-22: wbc 2 6 hb 11 8   alp 64 iron 207 tibcc 315 ferritin 393 7 soluble transferrin recept 47 1 Folic acid 86 2 T78 777 Vit D 58 iron sat 66% CEA 4 0  42 3 CA 15-3  gfr 74   10- : WBC 3 2  HB 12 3    ldh 10, iron 181 tibc 313 ferritin 379  TSH 1 67 free t3 3 2 free t4  85  CEA 4 8 ca 125 43 9 ca 19-9 19 6 ca 15-3 25 4  8-: wbc 2 9  bh 12 4     ldh 33, iron 234 tibc 311 ferritin 361   7-: wbc 3 0  hb 12 4  plt 133   iron 234 tibc 186 ferritin 805 folic acid 24 M41 921 vit d 50 cea 3 6 ca 125 53 6 ca 19-9 17 9 ca 15-3 27 8  11-22-22: Chol 184, tg 76 hdl 71 ldl 98  Hba1c 6 0        The following portions of the patient's history were reviewed and updated as appropriate: allergies, current medications, past family history, past medical history, past social history, past surgical history and problem list     Review of Systems   Constitutional: Negative for appetite change, fatigue, fever and unexpected weight change  HENT: Negative for congestion, ear pain and sore throat  Eyes: Negative for pain and redness  Respiratory: Negative for cough and shortness of breath      Cardiovascular: Negative for chest pain, palpitations and leg swelling  Gastrointestinal: Negative for abdominal pain, diarrhea, nausea and vomiting  Endocrine: Negative for cold intolerance, polydipsia and polyuria  Genitourinary: Negative for dysuria, frequency and urgency  Musculoskeletal: Negative for arthralgias, gait problem and myalgias  Skin: Negative for rash  Allergic/Immunologic: Negative  Neurological: Negative for dizziness and headaches  Hematological: Negative for adenopathy  Psychiatric/Behavioral: Negative for behavioral problems           Historical Information   Patient Active Problem List   Diagnosis   • Diarrhea   • Hyperglycemia   • Hypertension   • Metastatic colorectal cancer (HCC)   • Hypercholesteremia   • Bruise of both arms   • Hereditary hemochromatosis ([facility] )   • Vitamin D deficiency   • Osteopenia   • Bleeding per rectum   • Incisional hernia with obstruction but no gangrene   • Left inguinal hernia   • Right inguinal hernia   • Other constipation   • Encounter for surgical follow-up care   • AV (angiodysplasia malformation of colon)   • Edema of right lower leg   • Balance disorder   • Weakness of both legs   • Idiopathic chronic venous hypertension 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 (HCC)   • Neuropathy   • Pterygium of both eyes   • Chemotherapy-induced neutropenia (HCC)   • Glaucoma   • Age-related cataract   • Thrombocytopenia (HCC)   • Malignant neoplasm of liver, unspecified liver malignancy type ([facility] )   • Nuclear sclerotic cataract   • Primary open angle glaucoma of both eyes, moderate stage   • Elevated blood pressure reading     Past Medical History:   Diagnosis Date   • Cancer ([facility] )     rectal   • Heart murmur    • High cholesterol    • HLD (hyperlipidemia)     border line   • Hx of radiation therapy      Past Surgical History:   Procedure Laterality Date   • COLONOSCOPY     • COLOSTOMY TAKEDOWN/REVERSE ANTON  2018    6 months after   • HARTMANS PROCEDURE  2018   • IR BALLOON-OCCLUDED ANTEGRADE TRANSVENOUS OBLITERATION (BATO)  5/3/2021   • IR PORT REMOVAL  2021   • LIVER BIOPSY      in 2017   • PORTACATH PLACEMENT      in 2016   • OR RPR 1ST INGUN HRNA AGE 5 YRS/> REDUCIBLE Left 2021    Procedure: REPAIR HERNIA INGUINAL WITH MESH;  Surgeon: Miesha Dai MD;  Location: [facility];  Service: General     Social History     Substance and Sexual Activity   Alcohol Use Yes    Comment: occasionally  Social History     Substance and Sexual Activity   Drug Use Never     Social History     Tobacco Use   Smoking Status Former   • Packs/day:    • Years: 10 00   • Pack years: 10 00   • Types: Cigarettes   • Quit date:    • Years since quittin 1   Smokeless Tobacco Never   Tobacco Comments    quit 20 years ago     Family History   Problem Relation Age of Onset   • No Known Problems Mother    • No Known Problems Father      Health Maintenance Due   Topic   • Hepatitis C Screening    • Pneumococcal Vaccine: 65+ Years (1 - PCV)   • COVID-19 Vaccine (5 - Booster for Moderna series)   • Fall Risk    • Medicare Annual Wellness Visit (AWV)       Meds/Allergies       Current Outpatient Medications:   •  ascorbic acid (VITAMIN C) 1000 MG tablet, Take 1,000 mg by mouth, Disp: , Rfl:   •  Calcium Carb-Cholecalciferol (Calcium 1000 + D) 1000-800 MG-UNIT TABS, Take 600 mg by mouth 2 (two) times a day, Disp: , Rfl:   •  CALCIUM MAGNESIUM 750 PO, Take 2 tablets by mouth in the morning, Disp: , Rfl:   •  capecitabine (XELODA) 500 MG tablet, 500 mg 2 (two) times a day On 2 weeks off 1 week    2 tabs bid, Disp: , Rfl:   •  Cholecalciferol (VITAMIN D3) 3000 units TABS, Take by mouth, Disp: , Rfl:   •  Cyanocobalamin (VITAMIN B 12 PO), Take by mouth, Disp: , Rfl:   •  folic acid (FOLVITE) 1 mg tablet, Take by mouth daily, Disp: , Rfl:   •  Lumigan 0 01 % ophthalmic drops, , Disp: , Rfl:   •  Multiple Vitamins-Minerals (CENTRUM SILVER PO), Take by mouth, Disp: , Rfl:   • polyethylene glycol (MIRALAX) 17 g packet, Take 17 g by mouth daily for 7 days (Patient taking differently: Take 17 g by mouth as needed), Disp: 119 g, Rfl: 0  •  Magnesium 400 MG TABS, Take 1 tablet by mouth in the morning (Patient not taking: Reported on 2/14/2023), Disp: , Rfl:   •  potassium chloride (Klor-Con) 10 mEq tablet, potassium chloride ER 10 mEq tablet,extended release (Patient not taking: Reported on 2/14/2023), Disp: , Rfl:       Objective:    Vitals:   /86   Pulse 69   Temp 98 °F (36 7 °C)   Ht 6' (1 829 m)   Wt 80 8 kg (178 lb 3 2 oz)   SpO2 100%   BMI 24 17 kg/m²   Body mass index is 24 17 kg/m²  Vitals:    02/14/23 0910   Weight: 80 8 kg (178 lb 3 2 oz)       Physical Exam  Constitutional:       General: He is not in acute distress  Appearance: He is well-developed  He is not ill-appearing or diaphoretic  HENT:      Head: Normocephalic and atraumatic  Right Ear: External ear normal       Left Ear: External ear normal    Eyes:      General: Lids are normal          Right eye: No discharge  Left eye: No discharge  Conjunctiva/sclera: Conjunctivae normal    Neck:      Thyroid: No thyroid mass or thyromegaly  Vascular: No carotid bruit or JVD  Trachea: Trachea normal    Cardiovascular:      Rate and Rhythm: Regular rhythm  Heart sounds: Normal heart sounds  Pulmonary:      Effort: No respiratory distress  Breath sounds: No wheezing, rhonchi or rales  Musculoskeletal:      Right lower leg: No edema  Left lower leg: No edema  Lymphadenopathy:      Cervical: No cervical adenopathy  Skin:     General: Skin is warm  Coloration: Skin is not jaundiced or pale  Findings: No rash  Neurological:      Mental Status: He is alert and oriented to person, place, and time  Motor: No tremor or atrophy  Deep Tendon Reflexes: Reflexes are normal and symmetric  Psychiatric:         Mood and Affect: Mood is not anxious  Speech: Speech normal          Behavior: Behavior is not agitated, aggressive, withdrawn, hyperactive or combative  Thought Content: Thought content is not paranoid or delusional  Thought content does not include homicidal or suicidal ideation  Thought content does not include suicidal plan  Judgment: Judgment normal          Lab Review   No visits with results within 2 Month(s) from this visit  Latest known visit with results is:   Orders Only on 10/19/2022   Component Date Value Ref Range Status   • SARS COV-2 ANTIBODY IGG 10/19/2022 Positive (A)  Reactive, Weakly Reactive, Non-reactive, Equivocal, Indeterminate, Borderline, Negative, Other Final         Patient Instructions     Low salt diet  No alcohol  Check Blood Pressure daily, call me if remains more than 462 and diastolic more than 90    Follow with Consultants as per their and our suggestion    Follow up in one month or as needed earlier    Follow all instructions as advised and discussed  Take your medications as prescribed  Call the office immediately if you experience any side effects  Ask questions if you do not understand  Keep your scheduled appointment as advised or come sooner if necessary or in doubt  Best time to call for non-urgent matter or questions on weekdays is between 9am and 12 noon  See physician for any new symptoms or worsening of current symptoms  Urgent or emergent situations call 911 and report to nearest emergency room  I spent  30 -40 minutes taking care of this patient including clinical care, conseling, collaboration, chart, lab and consultaion review as appropriate    Patient is to get labs 1 week(s) prior to next visit if advised        Dr Shyrl Baumgarten, MD  [facility]       "This note has been constructed using a voice recognition system  Therefore there may be syntax, spelling, and/or grammatical errors   Please call if you have any questions  "

## 2023-02-12 NOTE — ASSESSMENT & PLAN NOTE
11-22-22: wbc 2 6 hb 11 8   alp 64 iron 207 tibcc 315 ferritin 393 7 soluble transferrin recept 77 2 Folic acid 20 5 T89 166 Vit D 58 iron sat 66% CEA 4 0  42 3 CA 15-3  gfr 74     10- : WBC 3 2  HB 12 3    ldh 10, iron 181 tibc 313 ferritin 379  TSH 1 67 free t3 3 2 free t4  85  CEA 4 8 ca 125 43 9 ca 19-9 19 6 ca 15-3 25 4      8-: wbc 2 9  bh 12 4     ldh 33, iron 234 tibc 311 ferritin 361       7-: wbc 3 0  hb 12 4  plt 133   iron 234 tibc 571 ferritin 286 folic acid 24 U67 002 vit d 50 cea 3 6 ca 125 53 6 ca 19-9 17 9 ca 15-3 27 8

## 2023-02-12 NOTE — ASSESSMENT & PLAN NOTE
11-22-22: Chol 184, tg 76 hdl 71 ldl 98  Hba1c 6 0    Elevated BP today, home bp is usually 120 range at home  Low salt diet  No alcohol  Check Blood Pressure daily, call me if remains more than 871 and diastolic more than 90

## 2023-02-14 ENCOUNTER — OFFICE VISIT (OUTPATIENT)
Dept: INTERNAL MEDICINE CLINIC | Facility: CLINIC | Age: 78
End: 2023-02-14

## 2023-02-14 VITALS
HEART RATE: 69 BPM | TEMPERATURE: 98 F | WEIGHT: 178.2 LBS | HEIGHT: 72 IN | OXYGEN SATURATION: 100 % | SYSTOLIC BLOOD PRESSURE: 144 MMHG | BODY MASS INDEX: 24.14 KG/M2 | DIASTOLIC BLOOD PRESSURE: 86 MMHG

## 2023-02-14 DIAGNOSIS — D69.6 THROMBOCYTOPENIA (HCC): ICD-10-CM

## 2023-02-14 DIAGNOSIS — C22.9 MALIGNANT NEOPLASM OF LIVER, UNSPECIFIED LIVER MALIGNANCY TYPE (HCC): ICD-10-CM

## 2023-02-14 DIAGNOSIS — E83.119 HEMOCHROMATOSIS, UNSPECIFIED HEMOCHROMATOSIS TYPE: ICD-10-CM

## 2023-02-14 DIAGNOSIS — I87.333 IDIOPATHIC CHRONIC VENOUS HYPERTENSION OF BOTH LOWER EXTREMITIES WITH ULCER AND INFLAMMATION (HCC): ICD-10-CM

## 2023-02-14 DIAGNOSIS — T45.1X5A CHEMOTHERAPY-INDUCED NEUTROPENIA (HCC): ICD-10-CM

## 2023-02-14 DIAGNOSIS — E78.00 HYPERCHOLESTEREMIA: ICD-10-CM

## 2023-02-14 DIAGNOSIS — L97.919 IDIOPATHIC CHRONIC VENOUS HYPERTENSION OF BOTH LOWER EXTREMITIES WITH ULCER AND INFLAMMATION (HCC): ICD-10-CM

## 2023-02-14 DIAGNOSIS — L97.929 IDIOPATHIC CHRONIC VENOUS HYPERTENSION OF BOTH LOWER EXTREMITIES WITH ULCER AND INFLAMMATION (HCC): ICD-10-CM

## 2023-02-14 DIAGNOSIS — G62.9 NEUROPATHY: ICD-10-CM

## 2023-02-14 DIAGNOSIS — E83.110 HEREDITARY HEMOCHROMATOSIS (HCC): ICD-10-CM

## 2023-02-14 DIAGNOSIS — I10 PRIMARY HYPERTENSION: ICD-10-CM

## 2023-02-14 DIAGNOSIS — H25.013 CORTICAL AGE-RELATED CATARACT OF BOTH EYES: ICD-10-CM

## 2023-02-14 DIAGNOSIS — R03.0 ELEVATED BLOOD PRESSURE READING: ICD-10-CM

## 2023-02-14 DIAGNOSIS — R60.0 EDEMA OF RIGHT LOWER LEG: ICD-10-CM

## 2023-02-14 DIAGNOSIS — D70.1 CHEMOTHERAPY-INDUCED NEUTROPENIA (HCC): ICD-10-CM

## 2023-02-14 DIAGNOSIS — C19 METASTATIC COLORECTAL CANCER (HCC): Primary | ICD-10-CM

## 2023-02-14 DIAGNOSIS — R73.9 HYPERGLYCEMIA: ICD-10-CM

## 2023-02-14 NOTE — ASSESSMENT & PLAN NOTE
Rec; non pharmacological approach    Low salt diet  No alcohol  Check Blood Pressure daily, call me if remains more than 491 and diastolic more than 90

## 2023-02-14 NOTE — PATIENT INSTRUCTIONS
Low salt diet  No alcohol  Check Blood Pressure daily, call me if remains more than 475 and diastolic more than 90    Follow with Consultants as per their and our suggestion    Follow up in one month or as needed earlier    Follow all instructions as advised and discussed  Take your medications as prescribed  Call the office immediately if you experience any side effects  Ask questions if you do not understand  Keep your scheduled appointment as advised or come sooner if necessary or in doubt  Best time to call for non-urgent matter or questions on weekdays is between 9am and 12 noon  See physician for any new symptoms or worsening of current symptoms  Urgent or emergent situations call 911 and report to nearest emergency room      I spent  30 -40 minutes taking care of this patient including clinical care, conseling, collaboration, chart, lab and consultaion review as appropriate    Patient is to get labs 1 week(s) prior to next visit if advised

## 2023-03-09 ENCOUNTER — HOSPITAL ENCOUNTER (EMERGENCY)
Facility: HOSPITAL | Age: 78
Discharge: HOME | End: 2023-03-09
Attending: EMERGENCY MEDICINE

## 2023-03-09 ENCOUNTER — APPOINTMENT (EMERGENCY)
Dept: RADIOLOGY | Facility: HOSPITAL | Age: 78
End: 2023-03-09

## 2023-03-09 VITALS
OXYGEN SATURATION: 99 % | RESPIRATION RATE: 20 BRPM | HEART RATE: 78 BPM | WEIGHT: 172 LBS | TEMPERATURE: 97.2 F | SYSTOLIC BLOOD PRESSURE: 167 MMHG | BODY MASS INDEX: 23.33 KG/M2 | DIASTOLIC BLOOD PRESSURE: 70 MMHG

## 2023-03-09 DIAGNOSIS — M54.50 LOW BACK PAIN: Primary | ICD-10-CM

## 2023-03-09 LAB
BILIRUB UR QL STRIP: NEGATIVE
CLARITY UR: CLEAR
COLOR UR: YELLOW
GLUCOSE UR STRIP-MCNC: NEGATIVE MG/DL
HGB UR QL STRIP.AUTO: NEGATIVE
KETONES UR STRIP-MCNC: ABNORMAL MG/DL
LEUKOCYTE ESTERASE UR QL STRIP: NEGATIVE
NITRITE UR QL STRIP: NEGATIVE
PH UR STRIP.AUTO: 5.5 [PH]
PROT UR STRIP-MCNC: NEGATIVE MG/DL
SP GR UR STRIP.AUTO: 1.02 (ref 1–1.03)
UROBILINOGEN UR QL STRIP.AUTO: 0.2 E.U./DL

## 2023-03-09 RX ORDER — IBUPROFEN 400 MG/1
400 TABLET ORAL ONCE
Status: COMPLETED | OUTPATIENT
Start: 2023-03-09 | End: 2023-03-09

## 2023-03-09 RX ADMIN — IBUPROFEN 400 MG: 400 TABLET, FILM COATED ORAL at 10:12

## 2023-03-09 NOTE — DISCHARGE INSTRUCTIONS
Tylenol may be taken every 6 hours for pain  Motrin 400 mg may be taken every 6 hours with food as needed for pain    Intermittent warm compresses  May apply over-the-counter lidocaine patch  Follow-up with comprehensive spine program for further evaluation   Call for appointment    Return to ED for increased pain, worsening symptoms

## 2023-03-09 NOTE — ED PROVIDER NOTES
History  Chief Complaint   Patient presents with   • Back Pain     Pt started with r sided lower back pain, pt hurts worse with getting up from a chair and sitting down  Pt denies any trauma or falling     Patient is a 68-year-old white male with history of colorectal cancer who reports greater than 1 month history of right lumbar back pain  States he woke yesterday and felt a "flash of pain" in this area  Pain is worse with movement and twisting and bending  No radiation pain, numbness, tingling down his legs  No bowel or bladder incontinence  States he has been applying Bengay and using Salonpas over-the-counter patch  No fever  No urinary symptoms  No abdominal pain  Prior to Admission Medications   Prescriptions Last Dose Informant Patient Reported? Taking? CALCIUM MAGNESIUM 750 PO   Yes No   Sig: Take 2 tablets by mouth in the morning   Calcium Carb-Cholecalciferol (Calcium 1000 + D) 1000-800 MG-UNIT TABS   Yes No   Sig: Take 600 mg by mouth 2 (two) times a day   Cholecalciferol (VITAMIN D3) 3000 units TABS   Yes No   Sig: Take by mouth   Cyanocobalamin (VITAMIN B 12 PO)   Yes No   Sig: Take by mouth   Lumigan 0 01 % ophthalmic drops   Yes No   Magnesium 400 MG TABS   Yes No   Sig: Take 1 tablet by mouth in the morning   Patient not taking: Reported on 2023   Multiple Vitamins-Minerals (CENTRUM SILVER PO)   Yes No   Sig: Take by mouth   ascorbic acid (VITAMIN C) 1000 MG tablet   Yes No   Sig: Take 1,000 mg by mouth   capecitabine (XELODA) 500 MG tablet   Yes No   Si mg 2 (two) times a day On 2 weeks off 1 week    2 tabs bid   folic acid (FOLVITE) 1 mg tablet   Yes No   Sig: Take by mouth daily   polyethylene glycol (MIRALAX) 17 g packet   No No   Sig: Take 17 g by mouth daily for 7 days   Patient taking differently: Take 17 g by mouth as needed   potassium chloride (Klor-Con) 10 mEq tablet   Yes No   Sig: potassium chloride ER 10 mEq tablet,extended release   Patient not taking: Reported on 2023      Facility-Administered Medications: None       Past Medical History:   Diagnosis Date   • Cancer ([address] )     rectal   • Heart murmur    • High cholesterol    • HLD (hyperlipidemia)     border line   • Hx of radiation therapy        Past Surgical History:   Procedure Laterality Date   • COLONOSCOPY     • COLOSTOMY TAKEDOWN/REVERSE ANTON  2018    6 months after   • HARTMANS PROCEDURE  2018   • IR BALLOON-OCCLUDED ANTEGRADE TRANSVENOUS OBLITERATION (BATO)  5/3/2021   • IR PORT REMOVAL  2021   • LIVER BIOPSY      in 2017   • PORTACATH PLACEMENT      in 2016   • WA RPR 1ST INGUN HRNA AGE 5 YRS/> REDUCIBLE Left 2021    Procedure: REPAIR HERNIA INGUINAL WITH MESH;  Surgeon: Bear Mcnally MD;  Location: [facility];  Service: General       Family History   Problem Relation Age of Onset   • No Known Problems Mother    • No Known Problems Father      I have reviewed and agree with the history as documented  E-Cigarette/Vaping   • E-Cigarette Use Never User      E-Cigarette/Vaping Substances   • Nicotine No    • THC No    • CBD No    • Flavoring No    • Other No    • Unknown No      Social History     Tobacco Use   • Smoking status: Former     Packs/day: 1 00     Years: 10 00     Pack years: 10 00     Types: Cigarettes     Quit date:      Years since quittin 2   • Smokeless tobacco: Never   • Tobacco comments:     quit 20 years ago   Vaping Use   • Vaping Use: Never used   Substance Use Topics   • Alcohol use: Yes     Comment: occasionally  • Drug use: Never       Review of Systems   Constitutional: Negative for chills and fever  HENT: Negative for ear pain and sore throat  Respiratory: Negative for cough and shortness of breath  Cardiovascular: Negative for chest pain and palpitations  Gastrointestinal: Negative for abdominal pain and vomiting  Genitourinary: Negative for dysuria and hematuria  Musculoskeletal: Positive for back pain   Negative for arthralgias and neck pain  Skin: Negative for color change and rash  Neurological: Negative for syncope and numbness  All other systems reviewed and are negative  Physical Exam  Physical Exam  Vitals and nursing note reviewed  Constitutional:       General: He is not in acute distress  Appearance: Normal appearance  He is not ill-appearing, toxic-appearing or diaphoretic  HENT:      Head: Normocephalic and atraumatic  Right Ear: Tympanic membrane, ear canal and external ear normal       Left Ear: Tympanic membrane, ear canal and external ear normal       Nose: Nose normal       Mouth/Throat:      Mouth: Mucous membranes are moist       Pharynx: Oropharynx is clear  Eyes:      Extraocular Movements: Extraocular movements intact  Conjunctiva/sclera: Conjunctivae normal       Pupils: Pupils are equal, round, and reactive to light  Cardiovascular:      Rate and Rhythm: Normal rate and regular rhythm  Pulses: Normal pulses  Heart sounds: Normal heart sounds  Pulmonary:      Effort: Pulmonary effort is normal       Breath sounds: Normal breath sounds  Abdominal:      General: Abdomen is flat  Bowel sounds are normal       Palpations: Abdomen is soft  Tenderness: There is no abdominal tenderness  There is no right CVA tenderness, left CVA tenderness, guarding or rebound  Musculoskeletal:      Cervical back: Normal range of motion and neck supple  Comments: Pain of chief complaint reproduced to right lower lumbar back with movement  Negative straight leg raise bilateral   No saddle anesthesia   Skin:     General: Skin is warm and dry  Capillary Refill: Capillary refill takes less than 2 seconds  Neurological:      General: No focal deficit present  Mental Status: He is alert and oriented to person, place, and time  Mental status is at baseline  Sensory: No sensory deficit           Vital Signs  ED Triage Vitals   Temperature Pulse Respirations Blood Pressure SpO2   03/09/23 0915 03/09/23 0913 03/09/23 0913 03/09/23 0915 03/09/23 0913   (!) 97 2 °F (36 2 °C) 76 20 167/70 99 %      Temp Source Heart Rate Source Patient Position - Orthostatic VS BP Location FiO2 (%)   03/09/23 0915 03/09/23 0913 -- -- --   Temporal Monitor         Pain Score       --                  Vitals:    03/09/23 0913 03/09/23 0915   BP:  167/70   Pulse: 76 78         Visual Acuity      ED Medications  Medications   ibuprofen (MOTRIN) tablet 400 mg (400 mg Oral Given 3/9/23 1012)       Diagnostic Studies  Results Reviewed     Procedure Component Value Units Date/Time    UA w Reflex to Microscopic w Reflex to Culture [[id number]]  (Abnormal) Collected: 03/09/23 1028    Lab Status: Final result Specimen: Urine, Clean Catch Updated: 03/09/23 1037     Color, UA Yellow     Clarity, UA Clear     Specific [place], UA 1 020     pH, UA 5 5     Leukocytes, UA Negative     Nitrite, UA Negative     Protein, UA Negative mg/dl      Glucose, UA Negative mg/dl      Ketones, UA Trace mg/dl      Urobilinogen, UA 0 2 E U /dl      Bilirubin, UA Negative     Occult Blood, UA Negative                 CT lumbar spine without contrast   Final Result by Vik Myrick DO (03/09 1122)      Lumbar degenerative change most prominent within the facet joints at L4-5 and to a lesser degree L5-S1  Overall there is no significant canal stenosis or discrete nerve impingement identified  No acute osseous abnormality  Workstation performed: [id number]                    Procedures  Procedures         ED Course                               SBIRT 20yo+    Flowsheet Row Most Recent Value   SBIRT (23 yo +)    In order to provide better care to our patients, we are screening all of our patients for alcohol and drug use  Would it be okay to ask you these screening questions?  No Filed at: 03/09/2023 1118                    Medical Decision Making  75-year-old white male with greater than 1 month history of right lower lumbar back pain that worsened yesterday morning  Symptoms are reproducible with movement  There is no acute osseous abnormality on CT lumbar spine  Patient was given Motrin and felt improved  He will be given an ambulatory referral to comprehensive spine program   Advised to take Motrin or Tylenol every 6 hours  Advised to apply intermittent warm compresses  May also apply over-the-counter lidocaine patch  Return precautions given including increased pain, leg weakness or numbness, urinary or bowel incontinence    Amount and/or Complexity of Data Reviewed  Radiology: ordered  Risk  Prescription drug management  Disposition  Final diagnoses:   None     ED Disposition     None      Follow-up Information    None         Patient's Medications   Discharge Prescriptions    No medications on file       No discharge procedures on file      PDMP Review       Value Time User    PDMP Reviewed  Yes 5/4/2021  2:56 PM Ivy Santoyo MD          ED Provider  Electronically Signed by           Tigre De La O PA-C  03/09/23 4941

## 2023-03-10 ENCOUNTER — TELEPHONE (OUTPATIENT)
Dept: PHYSICAL THERAPY | Facility: OTHER | Age: 78
End: 2023-03-10

## 2023-03-10 NOTE — TELEPHONE ENCOUNTER
Message left for patient regarding referral entered for  [facility]      Contact information, hours of operation and VM instructions left  Nurse requested patient to CB if needed and leave Full Name &  on VM  This is 1st attempt to reach patient by CSP    Referral deferred for f/u attempt per protocol

## 2023-03-13 NOTE — TELEPHONE ENCOUNTER
Call placed to the patient per [facility] referral     V/M left for the patient to call back  Phone number and hours of business provided  This is the 2nd attempt to reach the patient  Will defer referral per protocol

## 2023-03-28 ENCOUNTER — OFFICE VISIT (OUTPATIENT)
Dept: INTERNAL MEDICINE CLINIC | Facility: CLINIC | Age: 78
End: 2023-03-28

## 2023-03-28 VITALS
WEIGHT: 177 LBS | BODY MASS INDEX: 23.98 KG/M2 | SYSTOLIC BLOOD PRESSURE: 130 MMHG | DIASTOLIC BLOOD PRESSURE: 70 MMHG | HEART RATE: 81 BPM | OXYGEN SATURATION: 100 % | HEIGHT: 72 IN

## 2023-03-28 DIAGNOSIS — H25.013 CORTICAL AGE-RELATED CATARACT OF BOTH EYES: ICD-10-CM

## 2023-03-28 DIAGNOSIS — H25.13 NUCLEAR SCLEROTIC CATARACT OF BOTH EYES: Primary | ICD-10-CM

## 2023-03-28 DIAGNOSIS — M85.80 OSTEOPENIA, UNSPECIFIED LOCATION: ICD-10-CM

## 2023-03-28 DIAGNOSIS — E55.9 VITAMIN D DEFICIENCY: ICD-10-CM

## 2023-03-28 DIAGNOSIS — G62.9 NEUROPATHY: ICD-10-CM

## 2023-03-28 DIAGNOSIS — D69.6 THROMBOCYTOPENIA (HCC): ICD-10-CM

## 2023-03-28 DIAGNOSIS — C22.9 MALIGNANT NEOPLASM OF LIVER, UNSPECIFIED LIVER MALIGNANCY TYPE (HCC): ICD-10-CM

## 2023-03-28 DIAGNOSIS — E83.110 HEREDITARY HEMOCHROMATOSIS (HCC): ICD-10-CM

## 2023-03-28 DIAGNOSIS — C19 METASTATIC COLORECTAL CANCER: ICD-10-CM

## 2023-03-28 PROBLEM — R19.7 DIARRHEA: Status: RESOLVED | Noted: 2017-08-12 | Resolved: 2023-03-28

## 2023-03-28 NOTE — PATIENT INSTRUCTIONS
Patient is cleared for upcoming bilateral cataract surgery  Follow-up back in 3 months    Patient will continue to follow with him at oncologist closely

## 2023-03-28 NOTE — PROGRESS NOTES
Name: Gil Cook      : 1945      MRN: [id number]  Encounter Provider: Hunter Garcia MD  Encounter Date: 3/28/2023   Encounter department: [address]     1  Nuclear sclerotic cataract of both eyes  Assessment & Plan:  Bilateral cataract surgery  Patient scheduled for cataract extraction on 2023 as well as 2023  Patient is cleared from the medical standpoint for upcoming surgery  2  Hereditary hemochromatosis ([address] )    3  Cortical age-related cataract of both eyes  Assessment & Plan:  Bilateral cataract surgery  Patient scheduled for cataract extraction on 2023 as well as 2023  Patient is cleared from the medical standpoint for upcoming surgery  4  Vitamin D deficiency    5  Malignant neoplasm of liver, unspecified liver malignancy type ([address] )    6  Metastatic colorectal cancer ([address] )    7  Neuropathy    8  Osteopenia, unspecified location    9   Thrombocytopenia ([facility] [address] )  Assessment & Plan:  Lab Results   Component Value Date    WBC 5 99 2018    HGB 11 2 (L) 2018    HCT 33 3 (L) 2018     (H) 2018     2018                Subjective       Patient is here for Pre Op Clearance:    Date of Surgery: 2023 as well as 2023  Name of Surgeon: Kimberly husain ophthalmologist  Indication: Bilateral cataract surgery  [facility] proposed Surgery: Cataract extraction  Type of Anestheis; most likely local anesthesia    Recovery anticipation and care: Recovery at home    Pertinent Hx:  Cardiac: No history of Acute MI, CHF or arrythmia in last 6 months  Renal: Renal function stable, CKD level as per BMP  Anasethesia hx: No hx previous anaesthesia related problem  Endo: not pertinent except documented  Hemato: no pertinent blood disorder, hx of blood transfuciton  Dental: No obvious mouth infection    Pre Op Labs; no preop lab work were done however he had a blood test done through him at oncologist on 3/19/2023 reviewed  Ca1 2962 3, CA 15-3 28 8, CA 19-9 16 7, CBC normal except hemoglobin 11 8, platelet 421, CEA 3 6, CMP normal except blood sugar 111, ferritin 326, phosphorus 3 0 and 186 iron saturation 55% magnesium 1 6 TIBC 336 uric acid 5 1    Chronic disease fairly stable including history of C of the rectum, hemochromatosis, osteopenia, borderline blood sugar, borderline low hemoglobin, borderline low platelet, status post history of neuropathy, EtOH use        C    Review of Systems   Constitutional: Negative for appetite change, fatigue, fever and unexpected weight change  HENT: Negative for congestion, ear pain, postnasal drip, rhinorrhea and sore throat  Eyes: Positive for visual disturbance  Negative for pain and redness  Respiratory: Negative for cough and shortness of breath  Cardiovascular: Negative for chest pain, palpitations and leg swelling  Gastrointestinal: Negative for abdominal pain, constipation, diarrhea and vomiting  Endocrine: Negative for cold intolerance, polydipsia and polyuria  Genitourinary: Negative for dysuria, frequency, hematuria and urgency  Musculoskeletal: Negative for arthralgias, gait problem and myalgias  Skin: Negative for rash  Allergic/Immunologic: Negative  Neurological: Positive for numbness  Negative for dizziness, weakness and headaches  Hematological: Negative for adenopathy  Psychiatric/Behavioral: Negative for agitation, behavioral problems, confusion, hallucinations, sleep disturbance and suicidal ideas         Past Medical History:   Diagnosis Date   • Cancer ([facility] [address] )     rectal   • Heart murmur    • High cholesterol    • HLD (hyperlipidemia)     border line   • Hx of radiation therapy      Past Surgical History:   Procedure Laterality Date   • COLONOSCOPY     • COLOSTOMY TAKEDOWN/REVERSE ANTON  2018    6 months after   • HARTMANS PROCEDURE  2018   • IR BALLOON-OCCLUDED ANTEGRADE TRANSVENOUS OBLITERATION (BATO)  5/3/2021   • IR PORT REMOVAL  2021   • LIVER BIOPSY      in 2017   • PORTACATH PLACEMENT      in 2016   • MT RPR 1ST INGUN HRNA AGE 5 YRS/> REDUCIBLE Left 2021    Procedure: [address];  Surgeon: Mitchell García MD;  Location: [facility];  Service: General     Family History   Problem Relation Age of Onset   • No Known Problems Mother    • No Known Problems Father      Social History     Socioeconomic History   • Marital status: /Civil Union     Spouse name: None   • Number of children: None   • Years of education: None   • Highest education level: None   Occupational History   • None   Tobacco Use   • Smoking status: Former     Packs/day:      Years: 10 00     Pack years: 10 00     Types: Cigarettes     Quit date: 2000     Years since quittin 2   • Smokeless tobacco: Never   • Tobacco comments:     quit 20 years ago   Vaping Use   • Vaping Use: Never used   Substance and Sexual Activity   • Alcohol use: Yes     Comment: occasionally  • Drug use: Never   • Sexual activity: None   Other Topics Concern   • None   Social History Narrative    Lives with wife  Social Determinants of Health     Financial Resource Strain: Not on file   Food Insecurity: Not on file   Transportation Needs: Not on file   Physical Activity: Not on file   Stress: Not on file   Social Connections: Not on file   Intimate Partner Violence: Not on file   Housing Stability: Not on file     Current Outpatient Medications on File Prior to Visit   Medication Sig   • ascorbic acid (VITAMIN C) 1000 MG tablet Take 1,000 mg by mouth   • Calcium Carb-Cholecalciferol (Calcium 1000 + D) 1000-800 MG-UNIT TABS Take 600 mg by mouth 2 (two) times a day   • CALCIUM MAGNESIUM 750 PO Take 2 tablets by mouth in the morning   • capecitabine (XELODA) 500 MG tablet 500 mg 2 (two) times a day On 2 weeks off 1 week    2 tabs bid   • Cholecalciferol (VITAMIN D3) 3000 units TABS Take by mouth   • Cyanocobalamin (VITAMIN B 12 PO) Take by mouth   • folic acid (FOLVITE) 1 mg tablet Take by mouth daily   • Lumigan 0 01 % ophthalmic drops    • Magnesium 400 MG TABS Take 1 tablet by mouth in the morning   • Multiple Vitamins-Minerals (CENTRUM SILVER PO) Take by mouth   • polyethylene glycol (MIRALAX) 17 g packet Take 17 g by mouth daily for 7 days (Patient taking differently: Take 17 g by mouth as needed)   • [DISCONTINUED] potassium chloride (Klor-Con) 10 mEq tablet potassium chloride ER 10 mEq tablet,extended release (Patient not taking: Reported on 3/28/2023)     No Known Allergies  Immunization History   Administered Date(s) Administered   • COVID-19 MODERNA VACC 0 5 ML IM 01/21/2021, 02/19/2021, 10/25/2021   • COVID-19, unspecified 04/25/2022   • INFLUENZA 10/22/2017, 10/27/2022   • Influenza, high dose seasonal 0 7 mL 10/20/2020, 11/15/2021, 10/27/2022   • Influenza, injectable, quadrivalent, preservative free 0 5 mL 10/31/2019       Objective     /70   Pulse 81   Ht 6' (1 829 m)   Wt 80 3 kg (177 lb)   SpO2 100%   BMI 24 01 kg/m²     Physical Exam  Constitutional:       General: He is not in acute distress  Appearance: He is well-developed  He is not ill-appearing or diaphoretic  HENT:      Head: Normocephalic and atraumatic  Right Ear: External ear normal       Left Ear: External ear normal       Nose: No congestion or rhinorrhea  Eyes:      General: Lids are normal          Right eye: No discharge  Left eye: No discharge  Conjunctiva/sclera: Conjunctivae normal    Neck:      Thyroid: No thyroid mass or thyromegaly  Vascular: No carotid bruit or JVD  Trachea: Trachea normal    Cardiovascular:      Rate and Rhythm: Regular rhythm  Heart sounds: Normal heart sounds  Pulmonary:      Effort: No respiratory distress  Breath sounds: No wheezing, rhonchi or rales  Abdominal:      Tenderness: There is no abdominal tenderness     Musculoskeletal:         General: No swelling or tenderness  Right lower leg: No edema  Left lower leg: No edema  Lymphadenopathy:      Cervical: No cervical adenopathy  Skin:     General: Skin is warm  Coloration: Skin is not jaundiced or pale  Findings: No lesion or rash  Neurological:      Mental Status: He is alert and oriented to person, place, and time  Motor: No weakness, tremor or atrophy  Gait: Gait normal       Deep Tendon Reflexes: Reflexes are normal and symmetric  Psychiatric:         Mood and Affect: Mood normal  Mood is not anxious  Speech: Speech normal          Behavior: Behavior normal  Behavior is not agitated, aggressive, withdrawn, hyperactive or combative  Thought Content: Thought content normal  Thought content is not paranoid or delusional  Thought content does not include homicidal or suicidal ideation  Thought content does not include suicidal plan           Judgment: Judgment normal        Avril Millan MD

## 2023-03-28 NOTE — ASSESSMENT & PLAN NOTE
Lab Results   Component Value Date    WBC 5 99 02/01/2018    HGB 11 2 (L) 02/01/2018    HCT 33 3 (L) 02/01/2018     (H) 02/01/2018     02/01/2018

## 2023-03-28 NOTE — ASSESSMENT & PLAN NOTE
Bilateral cataract surgery  Patient scheduled for cataract extraction on 4/12/2023 as well as 4/26/2023  Patient is cleared from the medical standpoint for upcoming surgery

## 2023-06-27 ENCOUNTER — OFFICE VISIT (OUTPATIENT)
Dept: INTERNAL MEDICINE CLINIC | Facility: CLINIC | Age: 78
End: 2023-06-27
Payer: COMMERCIAL

## 2023-06-27 VITALS
HEIGHT: 72 IN | HEART RATE: 68 BPM | DIASTOLIC BLOOD PRESSURE: 60 MMHG | SYSTOLIC BLOOD PRESSURE: 120 MMHG | OXYGEN SATURATION: 100 % | WEIGHT: 178.2 LBS | BODY MASS INDEX: 24.14 KG/M2

## 2023-06-27 DIAGNOSIS — L97.929 NON-PRESSURE CHRONIC ULCER OF LEFT LOWER LEG, UNSPECIFIED ULCER STAGE (HCC): ICD-10-CM

## 2023-06-27 DIAGNOSIS — C22.9 MALIGNANT NEOPLASM OF LIVER, UNSPECIFIED LIVER MALIGNANCY TYPE (HCC): ICD-10-CM

## 2023-06-27 DIAGNOSIS — R60.0 EDEMA OF RIGHT LOWER LEG: ICD-10-CM

## 2023-06-27 DIAGNOSIS — I10 PRIMARY HYPERTENSION: ICD-10-CM

## 2023-06-27 DIAGNOSIS — D69.6 THROMBOCYTOPENIA (HCC): ICD-10-CM

## 2023-06-27 DIAGNOSIS — R73.9 HYPERGLYCEMIA: Primary | ICD-10-CM

## 2023-06-27 DIAGNOSIS — G62.9 NEUROPATHY: ICD-10-CM

## 2023-06-27 DIAGNOSIS — C19 PRIMARY MALIGNANT NEOPLASM OF COLORECTAL AREA WITH METASTASIS (HCC): ICD-10-CM

## 2023-06-27 DIAGNOSIS — Z00.00 MEDICARE ANNUAL WELLNESS VISIT, SUBSEQUENT: ICD-10-CM

## 2023-06-27 DIAGNOSIS — E83.110 HEREDITARY HEMOCHROMATOSIS (HCC): ICD-10-CM

## 2023-06-27 PROBLEM — L97.919 IDIOPATHIC CHRONIC VENOUS HYPERTENSION OF BOTH LOWER EXTREMITIES WITH ULCER AND INFLAMMATION (HCC): Status: RESOLVED | Noted: 2022-01-20 | Resolved: 2023-06-27

## 2023-06-27 PROBLEM — I87.333 IDIOPATHIC CHRONIC VENOUS HYPERTENSION OF BOTH LOWER EXTREMITIES WITH ULCER AND INFLAMMATION (HCC): Status: RESOLVED | Noted: 2022-01-20 | Resolved: 2023-06-27

## 2023-06-27 PROBLEM — H11.003 PTERYGIUM OF BOTH EYES: Status: RESOLVED | Noted: 2022-02-25 | Resolved: 2023-06-27

## 2023-06-27 PROBLEM — R26.89 BALANCE DISORDER: Status: RESOLVED | Noted: 2022-01-20 | Resolved: 2023-06-27

## 2023-06-27 PROBLEM — R29.898 WEAKNESS OF BOTH LEGS: Status: RESOLVED | Noted: 2022-01-20 | Resolved: 2023-06-27

## 2023-06-27 PROCEDURE — 99213 OFFICE O/P EST LOW 20 MIN: CPT | Performed by: INTERNAL MEDICINE

## 2023-06-27 PROCEDURE — G0439 PPPS, SUBSEQ VISIT: HCPCS | Performed by: INTERNAL MEDICINE

## 2023-06-27 RX ORDER — KETOROLAC TROMETHAMINE 5 MG/ML
SOLUTION OPHTHALMIC
COMMUNITY
Start: 2023-05-10

## 2023-06-27 NOTE — PROGRESS NOTES
Name: Radha Calderon      : 1945      MRN: [id number]  Encounter Provider: Mary Giordano MD  Encounter Date: 2023   Encounter department: [place] [address]     {There are no diagnoses linked to this encounter  (Refresh or delete this SmartLink)}       Subjective                 Patient is here for Pre Op Clearance:    Date of Surgery: 2023 as well as 2023  Name of Surgeon: Diasy husain ophthalmologist  Indication: Bilateral cataract surgery  Trista Lewis of proposed Surgery: Cataract extraction  Type of Anestheis; most likely local anesthesia    Recovery anticipation and care: Recovery at home    Pertinent Hx:  Cardiac: No history of Acute MI, CHF or arrythmia in last 6 months  Renal: Renal function stable, CKD level as per BMP  Anasethesia hx: No hx previous anaesthesia related problem  Endo: not pertinent except documented  Hemato: no pertinent blood disorder, hx of blood transfuciton  Dental: No obvious mouth infection    Pre Op Labs; no preop lab work were done however he had a blood test done through him at oncologist on 3/19/2023 reviewed  Ca1 2962 3, CA 15-3 28 8, CA 19-9 16 7, CBC normal except hemoglobin 11 8, platelet 172, CEA 3 6, CMP normal except blood sugar 111, ferritin 326, phosphorus 3 0 and 186 iron saturation 55% magnesium 1 6 TIBC 336 uric acid 5 1    Chronic disease fairly stable including history of C of the rectum, hemochromatosis, osteopenia, borderline blood sugar, borderline low hemoglobin, borderline low platelet, status post history of neuropathy, EtOH use        C    Review of Systems   Constitutional: Negative for chills and fever  HENT: Negative for ear pain, sore throat and trouble swallowing  Eyes: Negative for pain and visual disturbance  Respiratory: Negative for cough and shortness of breath  Cardiovascular: Negative for chest pain and palpitations     Gastrointestinal: Negative for abdominal pain, constipation, diarrhea and vomiting  Genitourinary: Negative for dysuria, hematuria and testicular pain  Musculoskeletal: Negative for arthralgias and back pain  Skin: Negative for color change and rash  Neurological: Negative for seizures and syncope  Psychiatric/Behavioral: Negative for sleep disturbance  The patient is not nervous/anxious  All other systems reviewed and are negative  Past Medical History:   Diagnosis Date   • Cancer ([address] )     rectal   • Heart murmur    • High cholesterol    • HLD (hyperlipidemia)     border line   • Hx of radiation therapy      Past Surgical History:   Procedure Laterality Date   • COLONOSCOPY     • COLOSTOMY TAKEDOWN/REVERSE ANTON  2018    6 months after   • HARTMANS PROCEDURE  2018   • IR BALLOON-OCCLUDED ANTEGRADE TRANSVENOUS OBLITERATION (BATO)  5/3/2021   • IR PORT REMOVAL  2021   • LIVER BIOPSY      in 2017   • PORTACATH PLACEMENT      in    • DC RPR 1ST INGUN HRNA AGE 5 YRS/> REDUCIBLE Left 2021    Procedure: REPAIR HERNIA INGUINAL WITH MESH;  Surgeon: Jeff Rai MD;  Location: [facility];  Service: General     Family History   Problem Relation Age of Onset   • No Known Problems Mother    • No Known Problems Father      Social History     Socioeconomic History   • Marital status: /Civil Union     Spouse name: Not on file   • Number of children: Not on file   • Years of education: Not on file   • Highest education level: Not on file   Occupational History   • Not on file   Tobacco Use   • Smoking status: Former     Packs/day: 1 00     Years: 10 00     Total pack years: 10 00     Types: Cigarettes     Quit date: 2000     Years since quittin 5   • Smokeless tobacco: Never   • Tobacco comments:     quit 20 years ago   Vaping Use   • Vaping Use: Never used   Substance and Sexual Activity   • Alcohol use: Yes     Comment: occasionally      • Drug use: Never   • Sexual activity: Not on file   Other Topics Concern   • Not on file   Social History Narrative    Lives with wife  Social Determinants of Health     Financial Resource Strain: Not on file   Food Insecurity: Not on file   Transportation Needs: Not on file   Physical Activity: Not on file   Stress: Not on file   Social Connections: Not on file   Intimate Partner Violence: Not on file   Housing Stability: Not on file     Current Outpatient Medications on File Prior to Visit   Medication Sig   • ascorbic acid (VITAMIN C) 1000 MG tablet Take 1,000 mg by mouth   • Calcium Carb-Cholecalciferol (Calcium 1000 + D) 1000-800 MG-UNIT TABS Take 600 mg by mouth 2 (two) times a day   • CALCIUM MAGNESIUM 750 PO Take 2 tablets by mouth in the morning   • capecitabine (XELODA) 500 MG tablet 500 mg 2 (two) times a day On 2 weeks off 1 week  2 tabs bid   • Cholecalciferol (VITAMIN D3) 3000 units TABS Take by mouth   • Cyanocobalamin (VITAMIN B 12 PO) Take by mouth   • folic acid (FOLVITE) 1 mg tablet Take by mouth daily   • Lumigan 0 01 % ophthalmic drops    • Magnesium 400 MG TABS Take 1 tablet by mouth in the morning   • Multiple Vitamins-Minerals (CENTRUM SILVER PO) Take by mouth   • polyethylene glycol (MIRALAX) 17 g packet Take 17 g by mouth daily for 7 days (Patient taking differently: Take 17 g by mouth as needed)     No Known Allergies  Immunization History   Administered Date(s) Administered   • COVID-19 MODERNA VACC 0 5 ML IM 01/21/2021, 02/19/2021, 10/25/2021   • COVID-19, unspecified 04/25/2022   • INFLUENZA 10/22/2017, 10/27/2022   • Influenza, high dose seasonal 0 7 mL 10/20/2020, 11/15/2021, 10/27/2022   • Influenza, injectable, quadrivalent, preservative free 0 5 mL 10/31/2019       Objective     Ht 6' (1 829 m)   BMI 24 01 kg/m²     Physical Exam  Vitals and nursing note reviewed  Constitutional:       Appearance: Normal appearance  He is well-developed and normal weight  He is not ill-appearing  HENT:      Head: Normocephalic and atraumatic        Right Ear: External ear normal       Left Ear: External ear normal       Nose: Nose normal       Mouth/Throat:      Pharynx: Oropharynx is clear  Eyes:      General: Lids are normal       Conjunctiva/sclera: Conjunctivae normal    Neck:      Thyroid: No thyroid mass or thyromegaly  Vascular: No JVD  Trachea: Trachea normal    Cardiovascular:      Rate and Rhythm: Normal rate and regular rhythm  Heart sounds: Normal heart sounds  Pulmonary:      Effort: Pulmonary effort is normal       Breath sounds: Normal breath sounds  No wheezing  Abdominal:      General: Bowel sounds are normal       Tenderness: There is no abdominal tenderness  Musculoskeletal:      Cervical back: Normal range of motion  Right lower leg: No edema  Left lower leg: No edema  Skin:     General: Skin is warm  Coloration: Skin is not pale  Findings: No rash  Neurological:      General: No focal deficit present  Mental Status: He is alert and oriented to person, place, and time  Motor: No weakness, tremor or atrophy  Coordination: Coordination normal       Gait: Gait normal       Deep Tendon Reflexes: Reflexes are normal and symmetric  Psychiatric:         Mood and Affect: Mood is not anxious  Speech: Speech normal          Behavior: Behavior normal  Behavior is not agitated, aggressive, withdrawn, hyperactive or combative  Thought Content: Thought content is not paranoid or delusional  Thought content does not include homicidal or suicidal ideation  Thought content does not include suicidal plan           Judgment: Judgment normal        Radhika Rodney MD

## 2023-06-27 NOTE — ASSESSMENT & PLAN NOTE
Metastatic colorectal surgeon has been stable  Remains on chemotherapy oral   Neutropenia is better  Platelet low normal 750  Going for ultrasound of abdomen being followed by [facility] and managed by them

## 2023-06-27 NOTE — PROGRESS NOTES
Assessment and Plan:     Problem List Items Addressed This Visit        Digestive    Metastatic colorectal cancer     See below malignant neoplasm of the liver         Malignant neoplasm of liver, unspecified liver malignancy type [facility])     Metastatic colorectal surgeon has been stable  Remains on chemotherapy oral   Neutropenia is better  Platelet low normal 671  Going for ultrasound of abdomen being followed by [facility] and managed by them  Cardiovascular and Mediastinum    Hypertension     Blood pressure control  Patient is not hypertensive  Nervous and Auditory    Neuropathy     Neuropathy is not a major issue            Hematopoietic and Hemostatic    Thrombocytopenia (HCC)     Platelet 099 hemoglobin 11 WBC normal            Other    Hyperglycemia - Primary     Blood sugar in the range of 111 continue to monitor diet         Hereditary hemochromatosis (HCC)     Iron saturation 55%  Ferritin 325 range hemoglobin 11 to 12 g range  Gives blood once a month         Edema of right lower leg     Resolved         RESOLVED: Non-pressure chronic ulcer of left lower leg, unspecified ulcer stage ([facility] [address] )   Other Visit Diagnoses     Medicare annual wellness visit, subsequent               Preventive health issues were discussed with patient, and age appropriate screening tests were ordered as noted in patient's After Visit Summary  Personalized health advice and appropriate referrals for health education or preventive services given if needed, as noted in patient's After Visit Summary  History of Present Illness:     Patient presents for a Medicare Wellness Visit    Patient is here for follow-up  Offers no specific complaint  We talked about ventral hernia at the site of the colostomy  We educated what to watch for that it is reducible soft  Also had a left inguinal hernia surgery by Dr Sangeetha Egan 2020 when his notes postoperative 5 weeks later were reviewed    Also he is aware of this ventral hernia as well as the right inguinal hernia plan was to monitor that  Hemochromatosis fair  Ferritin normal   Hemoglobin in the range of 11 g  Iron saturation 55%  Gives blood once a month  Blood sugar mildly elevated will monitor  Blood pressure fair  Bowel movement reasonable  Appetite fairly good  Offers no specific complaint     Patient Care Team:  Nancy Stahl MD as PCP - General (Internal Medicine)  Nancy Stahl MD as PCP - [address] [place] [address] (RTE)     Review of Systems:     Review of Systems   Constitutional: Negative for appetite change and fatigue  HENT: Negative for ear pain, sore throat and trouble swallowing  Eyes: Negative for pain and visual disturbance  Respiratory: Negative for cough and shortness of breath  Cardiovascular: Negative for chest pain and palpitations  Gastrointestinal: Negative for abdominal pain, constipation, diarrhea and vomiting  Genitourinary: Negative for dysuria, hematuria and testicular pain  Musculoskeletal: Negative for arthralgias and back pain  Skin: Negative for color change and rash  Neurological: Negative for dizziness, seizures, syncope and headaches  Psychiatric/Behavioral: Negative for sleep disturbance  The patient is not nervous/anxious  All other systems reviewed and are negative         Problem List:     Patient Active Problem List   Diagnosis   • Hyperglycemia   • Hypertension   • Metastatic colorectal cancer   • Hypercholesteremia   • Bruise of both arms   • Hereditary hemochromatosis ([address] )   • Vitamin D deficiency   • Osteopenia   • Bleeding per rectum   • Incisional hernia with obstruction but no gangrene   • Left inguinal hernia   • Right inguinal hernia   • Other constipation   • Encounter for surgical follow-up care   • AV (angiodysplasia malformation of colon)   • Edema of right lower leg   • Neuropathy   • Chemotherapy-induced neutropenia (HCC)   • Age-related cataract   • Thrombocytopenia ([facility] [address] )   • Malignant neoplasm of liver, unspecified liver malignancy type ([facility] [address] )   • Nuclear sclerotic cataract   • Primary open angle glaucoma of both eyes, moderate stage      Past Medical and Surgical History:     Past Medical History:   Diagnosis Date   • Cancer ([facility] [address] )     rectal   • Heart murmur    • High cholesterol    • HLD (hyperlipidemia)     border line   • Hx of radiation therapy      Past Surgical History:   Procedure Laterality Date   • COLONOSCOPY     • COLOSTOMY TAKEDOWN/REVERSE ANTON  2018    6 months after   • HARTMANS PROCEDURE  2018   • IR BALLOON-OCCLUDED ANTEGRADE TRANSVENOUS OBLITERATION (BATO)  5/3/2021   • IR PORT REMOVAL  2021   • LIVER BIOPSY      in 2017   • PORTACATH PLACEMENT      in    • PA RPR 1ST INGUN HRNA AGE 3 YRS/> REDUCIBLE Left 2021    Procedure: [address];  Surgeon: Adan Portillo MD;  Location: [facility];  Service: General      Family History:     Family History   Problem Relation Age of Onset   • No Known Problems Mother    • No Known Problems Father       Social History:     Social History     Socioeconomic History   • Marital status: /Civil Union     Spouse name: None   • Number of children: None   • Years of education: None   • Highest education level: None   Occupational History   • None   Tobacco Use   • Smoking status: Former     Packs/day: 1 00     Years: 10 00     Total pack years: 10 00     Types: Cigarettes     Quit date:      Years since quittin 5   • Smokeless tobacco: Never   • Tobacco comments:     quit 20 years ago   Vaping Use   • Vaping Use: Never used   Substance and Sexual Activity   • Alcohol use: Yes     Comment: occasionally  • Drug use: Never   • Sexual activity: None   Other Topics Concern   • None   Social History Narrative    Lives with wife       Social Determinants of Health     Financial Resource Strain: Low Risk  (2023)    Overall Financial Resource Strain (CARDIA)    • Difficulty of Paying Living Expenses: Not hard at all   Food Insecurity: Not on file   Transportation Needs: No Transportation Needs (6/27/2023)    PRAPARE - Transportation    • Lack of Transportation (Medical): No    • Lack of Transportation (Non-Medical): No   Physical Activity: Not on file   Stress: Not on file   Social Connections: Not on file   Intimate Partner Violence: Not on file   Housing Stability: Not on file      Medications and Allergies:     Current Outpatient Medications   Medication Sig Dispense Refill   • ascorbic acid (VITAMIN C) 1000 MG tablet Take 1,000 mg by mouth     • Calcium Carb-Cholecalciferol (Calcium 1000 + D) 1000-800 MG-UNIT TABS Take 600 mg by mouth 2 (two) times a day     • CALCIUM MAGNESIUM 750 PO Take 2 tablets by mouth in the morning     • capecitabine (XELODA) 500 MG tablet 500 mg 2 (two) times a day On 2 weeks off 1 week  2 tabs bid     • Cholecalciferol (VITAMIN D3) 3000 units TABS Take by mouth     • Cyanocobalamin (VITAMIN B 12 PO) Take by mouth     • folic acid (FOLVITE) 1 mg tablet Take by mouth daily     • ketorolac (ACULAR) 0 5 % ophthalmic solution      • Magnesium 400 MG TABS Take 1 tablet by mouth in the morning     • Multiple Vitamins-Minerals (CENTRUM SILVER PO) Take by mouth       No current facility-administered medications for this visit       No Known Allergies   Immunizations:     Immunization History   Administered Date(s) Administered   • COVID-19 MODERNA VACC 0 5 ML IM 01/21/2021, 02/19/2021, 10/25/2021   • COVID-19, unspecified 04/25/2022   • INFLUENZA 10/22/2017, 10/27/2022   • Influenza, high dose seasonal 0 7 mL 10/20/2020, 11/15/2021, 10/27/2022   • Influenza, injectable, quadrivalent, preservative free 0 5 mL 10/31/2019      Health Maintenance:         Topic Date Due   • Hepatitis C Screening  Never done         Topic Date Due   • Pneumococcal Vaccine: 65+ Years (1 - PCV) Never done   • COVID-19 Vaccine (5 - Booster for Moderna series) 06/20/2022 Medicare Screening Tests and Risk Assessments:     Seth Hernandez is here for his Subsequent Wellness visit  Last Medicare Wellness visit information reviewed, patient interviewed and updates made to the record today  Health Risk Assessment:   Patient rates overall health as very good  Patient feels that their physical health rating is slightly better  Patient is very satisfied with their life  Eyesight was rated as same  Hearing was rated as same  Patient feels that their emotional and mental health rating is much better  Patients states they are never, rarely angry  Patient states they are sometimes unusually tired/fatigued  Pain experienced in the last 7 days has been none  Patient states that he has experienced no weight loss or gain in last 6 months  Weight is stable    Fall Risk Screening: In the past year, patient has experienced: no history of falling in past year      Home Safety:  Patient does not have trouble with stairs inside or outside of their home  Patient has working smoke alarms and has working carbon monoxide detector  Home safety hazards include: none  No home safety hazards    Nutrition:   Current diet is Regular  Medications:   Patient is currently taking over-the-counter supplements  OTC medications include: B12, C, D3, magnesium, calcium  Patient is able to manage medications  No problems managing meds    Activities of Daily Living (ADLs)/Instrumental Activities of Daily Living (IADLs):   Walk and transfer into and out of bed and chair?: Yes  Dress and groom yourself?: Yes    Bathe or shower yourself?: Yes    Feed yourself?  Yes  Do your laundry/housekeeping?: Yes  Manage your money, pay your bills and track your expenses?: Yes  Make your own meals?: Yes    Do your own shopping?: Yes    ADL comments: Pt is independent    Previous Hospitalizations:   Any hospitalizations or ED visits within the last 12 months?: Yes    How many hospitalizations have you had in the last year?: 1-2    Hospitalization Comments: Hip pain    Advance Care Planning:   Living will: Yes    Durable POA for healthcare: Yes    Advanced directive: Yes    Advanced directive counseling given: Yes    Five wishes given: No    Patient declined ACP directive: No    End of Life Decisions reviewed with patient: Yes    Provider agrees with end of life decisions: Yes      Cognitive Screening:   Provider or family/friend/caregiver concerned regarding cognition?: No    PREVENTIVE SCREENINGS      Cardiovascular Screening:    General: Screening Not Indicated and History Lipid Disorder      Diabetes Screening:     General: Screening Current      Colorectal Cancer Screening:     General: History Colorectal Cancer      Prostate Cancer Screening:    General: Screening Not Indicated      Osteoporosis Screening:    General: Screening Not Indicated and History Osteoporosis      Abdominal Aortic Aneurysm (AAA) Screening:    Risk factors include: tobacco use        General: Screening Not Indicated      Lung Cancer Screening:     General: Screening Not Indicated      Hepatitis C Screening:    General: Risks and Benefits Discussed and Patient Declines    Hep C Screening Accepted: No     Screening, Brief Intervention, and Referral to Treatment (SBIRT)    Screening    Typical number of drinks in a week: 12    AUDIT-C Screenin) How often did you have a drink containing alcohol in the past year? 2 to 3 times a week  2) How many drinks did you have on a typical day when you were drinking in the past year?  1 to 2  3) How often did you have 6 or more drinks on one occasion in the past year? never    AUDIT-C Score: 3  Interpretation: Score 0-3 (male): Negative screen for alcohol misuse    Single Item Drug Screening:  How often have you used an illegal drug (including marijuana) or a prescription medication for non-medical reasons in the past year? never    Single Item Drug Screen Score: 0  Interpretation: Negative screen for possible drug use disorder    Brief Intervention  Healthy alcohol use/limits discussed  Other Counseling Topics:   Car/seat belt/driving safety and calcium and vitamin D intake  No results found  Physical Exam:     /60   Pulse 68   Ht 6' (1 829 m)   Wt 80 8 kg (178 lb 3 2 oz)   SpO2 100%   BMI 24 17 kg/m²     Physical Exam  Vitals and nursing note reviewed  Constitutional:       General: He is not in acute distress  Appearance: Normal appearance  He is well-developed and normal weight  He is not ill-appearing  HENT:      Head: Normocephalic and atraumatic  Right Ear: External ear normal       Left Ear: External ear normal       Nose: Nose normal  No congestion or rhinorrhea  Mouth/Throat:      Pharynx: Oropharynx is clear  Eyes:      General: No scleral icterus  Conjunctiva/sclera: Conjunctivae normal    Cardiovascular:      Rate and Rhythm: Normal rate and regular rhythm  Pulses: Normal pulses  Pulmonary:      Effort: Pulmonary effort is normal       Breath sounds: Normal breath sounds  No wheezing  Abdominal:      General: Bowel sounds are normal  There is no distension  Palpations: Abdomen is soft  There is no mass  Tenderness: There is no abdominal tenderness  Hernia: No hernia is present  Musculoskeletal:         General: No swelling  Cervical back: Normal range of motion and neck supple  Right lower leg: No edema  Left lower leg: No edema  Lymphadenopathy:      Cervical: No cervical adenopathy  Skin:     General: Skin is warm and dry  Capillary Refill: Capillary refill takes less than 2 seconds  Coloration: Skin is not pale  Findings: No bruising or rash  Neurological:      General: No focal deficit present  Mental Status: He is alert and oriented to person, place, and time  Motor: No weakness        Coordination: Coordination normal       Gait: Gait normal    Psychiatric:         Mood and Affect: Mood normal  Behavior: Behavior normal          Thought Content:  Thought content normal          Judgment: Judgment normal      Magnesium is 1 6 recommend over-the-counter magnesium    Nicolás Richter MD

## 2023-06-27 NOTE — PATIENT INSTRUCTIONS
Medicare Preventive Visit Patient Instructions  Thank you for completing your Welcome to Medicare Visit or Medicare Annual Wellness Visit today  Your next wellness visit will be due in one year (6/27/2024)  The screening/preventive services that you may require over the next 5-10 years are detailed below  Some tests may not apply to you based off risk factors and/or age  Screening tests ordered at today's visit but not completed yet may show as past due  Also, please note that scanned in results may not display below  Preventive Screenings:  Service Recommendations Previous Testing/Comments   Colorectal Cancer Screening  Colonoscopy    Fecal Occult Blood Test (FOBT)/Fecal Immunochemical Test (FIT)  Fecal DNA/Cologuard Test  Flexible Sigmoidoscopy Age: 39-70 years old   Colonoscopy: every 10 years (May be performed more frequently if at higher risk)  OR  FOBT/FIT: every 1 year  OR  Cologuard: every 3 years  OR  Sigmoidoscopy: every 5 years  Screening may be recommended earlier than age 39 if at higher risk for colorectal cancer  Also, an individualized decision between you and your healthcare provider will decide whether screening between the ages of 74-80 would be appropriate   Colonoscopy: 04/07/2021  FOBT/FIT: Not on file  Cologuard: Not on file  Sigmoidoscopy: Not on file          Prostate Cancer Screening Individualized decision between patient and health care provider in men between ages of 53-78   Medicare will cover every 12 months beginning on the day after your 50th birthday PSA: No results in last 5 years           Hepatitis C Screening Once for adults born between 80 and 1965  More frequently in patients at high risk for Hepatitis C Hep C Antibody: Not on file        Diabetes Screening 1-2 times per year if you're at risk for diabetes or have pre-diabetes Fasting glucose: No results in last 5 years (No results in last 5 years)  A1C: 5 3 % (11/22/2022)      Cholesterol Screening Once every 5 years if you don't have a lipid disorder  May order more often based on risk factors  Lipid panel: 12/21/2021         Other Preventive Screenings Covered by Medicare:  Abdominal Aortic Aneurysm (AAA) Screening: covered once if your at risk  You're considered to be at risk if you have a family history of AAA or a male between the age of 73-68 who smoking at least 100 cigarettes in your lifetime  Lung Cancer Screening: covers low dose CT scan once per year if you meet all of the following conditions: (1) Age 50-69; (2) No signs or symptoms of lung cancer; (3) Current smoker or have quit smoking within the last 15 years; (4) You have a tobacco smoking history of at least 20 pack years (packs per day x number of years you smoked); (5) You get a written order from a healthcare provider  Glaucoma Screening: covered annually if you're considered high risk: (1) You have diabetes OR (2) Family history of glaucoma OR (3)  aged 48 and older OR (3)  American aged 72 and older  Osteoporosis Screening: covered every 2 years if you meet one of the following conditions: (1) Have a vertebral abnormality; (2) On glucocorticoid therapy for more than 3 months; (3) Have primary hyperparathyroidism; (4) On osteoporosis medications and need to assess response to drug therapy  HIV Screening: covered annually if you're between the age of 12-76  Also covered annually if you are younger than 13 and older than 72 with risk factors for HIV infection  For pregnant patients, it is covered up to 3 times per pregnancy      Immunizations:  Immunization Recommendations   Influenza Vaccine Annual influenza vaccination during flu season is recommended for all persons aged >= 6 months who do not have contraindications   Pneumococcal Vaccine   * Pneumococcal conjugate vaccine = PCV13 (Prevnar 13), PCV15 (Vaxneuvance), PCV20 (Prevnar 20)  * Pneumococcal polysaccharide vaccine = PPSV23 (Pneumovax) Adults 25-60 years old: 1-3 doses may be recommended based on certain risk factors  Adults 72 years old: 1-2 doses may be recommended based off what pneumonia vaccine you previously received   Hepatitis B Vaccine 3 dose series if at intermediate or high risk (ex: diabetes, end stage renal disease, liver disease)   Tetanus (Td) Vaccine - COST NOT COVERED BY MEDICARE PART B Following completion of primary series, a booster dose should be given every 10 years to maintain immunity against tetanus  Td may also be given as tetanus wound prophylaxis  Tdap Vaccine - COST NOT COVERED BY MEDICARE PART B Recommended at least once for all adults  For pregnant patients, recommended with each pregnancy  Shingles Vaccine (Shingrix) - COST NOT COVERED BY MEDICARE PART B  2 shot series recommended in those aged 48 and above     Health Maintenance Due:      Topic Date Due    Hepatitis C Screening  Never done     Immunizations Due:      Topic Date Due    Pneumococcal Vaccine: 65+ Years (1 - PCV) Never done    COVID-19 Vaccine (5 - Booster for Thierry  series) 06/20/2022     Advance Directives   What are advance directives? Advance directives are legal documents that state your wishes and plans for medical care  These plans are made ahead of time in case you lose your ability to make decisions for yourself  Advance directives can apply to any medical decision, such as the treatments you want, and if you want to donate organs  What are the types of advance directives? There are many types of advance directives, and each state has rules about how to use them  You may choose a combination of any of the following:  Living will: This is a written record of the treatment you want  You can also choose which treatments you do not want, which to limit, and which to stop at a certain time  This includes surgery, medicine, IV fluid, and tube feedings  Durable power of  for healthcare [facility]):   This is a written record that states who you want to make healthcare choices for you when you are unable to make them for yourself  This person, called a proxy, is usually a family member or a friend  You may choose more than 1 proxy  Do not resuscitate (DNR) order:  A DNR order is used in case your heart stops beating or you stop breathing  It is a request not to have certain forms of treatment, such as CPR  A DNR order may be included in other types of advance directives  Medical directive: This covers the care that you want if you are in a coma, near death, or unable to make decisions for yourself  You can list the treatments you want for each condition  Treatment may include pain medicine, surgery, blood transfusions, dialysis, IV or tube feedings, and a ventilator (breathing machine)  Values history: This document has questions about your views, beliefs, and how you feel and think about life  This information can help others choose the care that you would choose  Why are advance directives important? An advance directive helps you control your care  Although spoken wishes may be used, it is better to have your wishes written down  Spoken wishes can be misunderstood, or not followed  Treatments may be given even if you do not want them  An advance directive may make it easier for your family to make difficult choices about your care  © Copyright [employer] 2018 Information is for End User's use only and may not be sold, redistributed or otherwise used for commercial purposes  All illustrations and images included in CareNotes® are the copyrighted property of A D A M , Inc  or [facility] [place][note 2] Lito Hernandez     Follow with Consultants as per their and our suggestion    Follow up in 16 week(s) or as needed earlier    Follow all instructions as advised and discussed  Take your medications as prescribed  Call the office immediately if you experience any side effects  Ask questions if you do not understand      Keep your scheduled appointment as advised or come sooner if necessary or in doubt  Best time to call for non-urgent matter or questions on weekdays is between 9am and 12 noon  See physician for any new symptoms or worsening of current symptoms  Urgent or emergent situations call 911 and report to nearest emergency room  I spent  time taking care of this patient including clinical care, conseling, collaboration, chart, lab and consultant's follow up note,images report, documentation, pre visit  review as appropriate

## 2023-10-31 ENCOUNTER — OFFICE VISIT (OUTPATIENT)
Dept: INTERNAL MEDICINE CLINIC | Facility: CLINIC | Age: 78
End: 2023-10-31
Payer: COMMERCIAL

## 2023-10-31 VITALS
HEART RATE: 78 BPM | OXYGEN SATURATION: 98 % | BODY MASS INDEX: 24.11 KG/M2 | WEIGHT: 178 LBS | SYSTOLIC BLOOD PRESSURE: 136 MMHG | DIASTOLIC BLOOD PRESSURE: 74 MMHG | HEIGHT: 72 IN

## 2023-10-31 DIAGNOSIS — G62.9 NEUROPATHY: ICD-10-CM

## 2023-10-31 DIAGNOSIS — D70.1 CHEMOTHERAPY-INDUCED NEUTROPENIA: ICD-10-CM

## 2023-10-31 DIAGNOSIS — H40.1132 PRIMARY OPEN ANGLE GLAUCOMA OF BOTH EYES, MODERATE STAGE: ICD-10-CM

## 2023-10-31 DIAGNOSIS — E83.110 HEREDITARY HEMOCHROMATOSIS (HCC): ICD-10-CM

## 2023-10-31 DIAGNOSIS — Z13.220 SCREENING CHOLESTEROL LEVEL: ICD-10-CM

## 2023-10-31 DIAGNOSIS — T45.1X5A CHEMOTHERAPY-INDUCED NEUTROPENIA: ICD-10-CM

## 2023-10-31 DIAGNOSIS — H25.13 NUCLEAR SCLEROTIC CATARACT OF BOTH EYES: ICD-10-CM

## 2023-10-31 DIAGNOSIS — I10 PRIMARY HYPERTENSION: ICD-10-CM

## 2023-10-31 DIAGNOSIS — R60.0 EDEMA OF RIGHT LOWER LEG: ICD-10-CM

## 2023-10-31 DIAGNOSIS — D69.6 THROMBOCYTOPENIA (HCC): ICD-10-CM

## 2023-10-31 DIAGNOSIS — R73.9 HYPERGLYCEMIA: ICD-10-CM

## 2023-10-31 DIAGNOSIS — Z13.1 SCREENING FOR DIABETES MELLITUS: ICD-10-CM

## 2023-10-31 DIAGNOSIS — E55.9 VITAMIN D DEFICIENCY: ICD-10-CM

## 2023-10-31 DIAGNOSIS — E78.00 HYPERCHOLESTEREMIA: ICD-10-CM

## 2023-10-31 DIAGNOSIS — C19 PRIMARY MALIGNANT NEOPLASM OF COLORECTAL AREA WITH METASTASIS (HCC): Primary | ICD-10-CM

## 2023-10-31 PROCEDURE — 99214 OFFICE O/P EST MOD 30 MIN: CPT | Performed by: INTERNAL MEDICINE

## 2023-10-31 RX ORDER — POTASSIUM CHLORIDE 750 MG/1
TABLET, FILM COATED, EXTENDED RELEASE ORAL
COMMUNITY

## 2023-10-31 NOTE — ASSESSMENT & PLAN NOTE
Under care of opth.     Using eye drops - visine as needed    Pressure per patient is good    S/p bilateral cataract and Ptyregium surgery

## 2023-10-31 NOTE — ASSESSMENT & PLAN NOTE
Recently had blood test with Dr Solo Quintero,  Neutropenia chronic, base line,    Ferritin down 208 per pt hb is fair

## 2023-10-31 NOTE — ASSESSMENT & PLAN NOTE
11-22-22: Chol 184, tg 76 hdl 71 ldl 98  Hba1c 6.0    Continue low cholesterol diet    Will order hba1c and lipid with next blood test

## 2023-10-31 NOTE — ASSESSMENT & PLAN NOTE
Home -130/75-80    Sx free    Vitals:    10/31/23 0952   BP: 136/74   Pulse: 78   SpO2: 98%      Will continue non pharm approach, los salt diet,

## 2023-10-31 NOTE — PROGRESS NOTES
Name: Rowena Pham      : 1945      MRN: [id number]  Encounter Provider: Sheri Mei MD  Encounter Date: 10/31/2023   Encounter department: [address]     1. Metastatic colorectal cancer  Assessment & Plan:  [place] under care of Dr Max Alcocer    On Xeloda 500mg  two tabs daily    Labs per him      2. Thrombocytopenia ([facility] [address] [facility])    3. Neuropathy  -     Hemoglobin A1C; Future; Expected date: 2024    4. Hypercholesteremia  Assessment & Plan:  22: Chol 184, tg 76 hdl 71 ldl 98  Hba1c 6.0    Continue low cholesterol diet    Will order hba1c and lipid with next blood test    Orders:  -     Lipid panel; Future; Expected date: 2024  -     Comprehensive metabolic panel; Future; Expected date: 2024    5. Primary open angle glaucoma of both eyes, moderate stage  Assessment & Plan:  Under care of opth. Using eye drops - visine as needed    Pressure per patient is good    S/p bilateral cataract and Ptyregium surgery      6. Nuclear sclerotic cataract of both eyes  Assessment & Plan:  Under care of opth. Using eye drops - visine as needed    Pressure per patient is good    S/p bilateral cataract and Ptyregium surgery      7. Chemotherapy-induced neutropenia   Assessment & Plan:  Recently had blood test with Dr Max Alcocer,  Neutropenia chronic, base line,    Ferritin down 208 per pt hb is fair      8. Edema of right lower leg  Assessment & Plan:  Off and on,socks is helpful      9. Vitamin D deficiency  Assessment & Plan:  Taking vitamin d regularly    Will order vit d level    Orders:  -     Vitamin D 25 hydroxy; Future    10. Hereditary hemochromatosis ([facility])  Assessment & Plan:  Recently had blood test with Dr Max Alcocer,  Neutropenia chronic, base line,    Ferritin down 208 per pt hb is fair    Orders:  -     Comprehensive metabolic panel; Future; Expected date: 2024  -     Hemoglobin A1C; Future; Expected date: 2024    11. Hyperglycemia  Assessment & Plan:  11-22-22: Chol 184, tg 76 hdl 71 ldl 98  Hba1c 6.0    Continue carb control diet    Will order hba1c and lipid with next blood test    Orders:  -     Comprehensive metabolic panel; Future; Expected date: 01/31/2024  -     Hemoglobin A1C; Future; Expected date: 01/31/2024    12. Primary hypertension  Assessment & Plan:  Home -130/75-80    Sx free    Vitals:    10/31/23 0952   BP: 136/74   Pulse: 78   SpO2: 98%      Will continue non pharm approach, los salt diet,     Orders:  -     Vitamin D 25 hydroxy; Future  -     Lipid panel; Future; Expected date: 01/31/2024  -     Comprehensive metabolic panel; Future; Expected date: 01/31/2024  -     Hemoglobin A1C; Future; Expected date: 01/31/2024    13. Screening for diabetes mellitus  -     Hemoglobin A1C; Future; Expected date: 01/31/2024    14. Screening cholesterol level  -     Lipid panel; Future; Expected date: 01/31/2024           Subjective           Here for chronic disease management. Offers no specific complaint and generally feeling well. Hemochromatosis fair. Recent ferritin per him is 208 range. Blood pressure at home well controlled. Blood sugar will do hemoglobin A1c mildly elevated will check hemoglobin A1c next visit. Lipid profile check next time. Continue diet for cholesterol. Neuropathy mildly symptomatic interfering with his ability to play golf. No falls not requiring assistive device. Metastatic colorectal cancer fair. Being followed by hematologist very closely. On chemotherapy mild chronic neutropenia hemoglobin fair. Platelet reasonable. Status post cataract surgery as well as pterygium surgery. Generally fair reason. Will monitor LFT. Review of Systems   Constitutional:  Negative for chills, fatigue and fever. HENT:  Negative for ear pain, sore throat and trouble swallowing. Eyes:  Negative for pain and visual disturbance.    Respiratory:  Negative for cough and shortness of breath. Cardiovascular:  Negative for chest pain and palpitations. Gastrointestinal:  Negative for abdominal pain, constipation, diarrhea and vomiting. Genitourinary:  Negative for difficulty urinating, dysuria, flank pain, frequency, hematuria and testicular pain. Musculoskeletal:  Negative for arthralgias and back pain. Skin:  Negative for color change and rash. Neurological:  Negative for dizziness, seizures, syncope, light-headedness and headaches. Psychiatric/Behavioral:  Negative for agitation, confusion, hallucinations and self-injury. The patient is not nervous/anxious. All other systems reviewed and are negative.       Past Medical History:   Diagnosis Date   • Cancer ([address])     rectal   • Heart murmur    • High cholesterol    • HLD (hyperlipidemia)     border line   • Hx of radiation therapy      Past Surgical History:   Procedure Laterality Date   • COLONOSCOPY     • COLOSTOMY TAKEDOWN/REVERSE ANTON  2018    6 months after   • HARTMANS PROCEDURE  2018   • IR BALLOON-OCCLUDED ANTEGRADE TRANSVENOUS OBLITERATION (BATO)  5/3/2021   • IR PORT REMOVAL  2021   • LIVER BIOPSY      in 2017   • PORTACATH PLACEMENT      in    • MD RPR 1ST INGUN HRNA AGE 5 YRS/> REDUCIBLE Left 2021    Procedure: REPAIR HERNIA INGUINAL WITH MESH;  Surgeon: Elly Daniels MD;  Location: [facility];  Service: General     Family History   Problem Relation Age of Onset   • No Known Problems Mother    • No Known Problems Father      Social History     Socioeconomic History   • Marital status: /Civil Union     Spouse name: None   • Number of children: None   • Years of education: None   • Highest education level: None   Occupational History   • None   Tobacco Use   • Smoking status: Former     Packs/day: 1.00     Years: 10.00     Total pack years: 10.00     Types: Cigarettes     Quit date: 2000     Years since quittin.8   • Smokeless tobacco: Never   • Tobacco comments:     quit 20 years ago Vaping Use   • Vaping Use: Never used   Substance and Sexual Activity   • Alcohol use: Yes     Comment: occasionally. • Drug use: Never   • Sexual activity: None   Other Topics Concern   • None   Social History Narrative    Lives with wife. Social Determinants of Health     Financial Resource Strain: Low Risk  (6/27/2023)    Overall Financial Resource Strain (CARDIA)    • Difficulty of Paying Living Expenses: Not hard at all   Food Insecurity: Not on file   Transportation Needs: No Transportation Needs (6/27/2023)    PRAPARE - Transportation    • Lack of Transportation (Medical): No    • Lack of Transportation (Non-Medical): No   Physical Activity: Not on file   Stress: Not on file   Social Connections: Not on file   Intimate Partner Violence: Not on file   Housing Stability: Not on file     Current Outpatient Medications on File Prior to Visit   Medication Sig   • ascorbic acid (VITAMIN C) 1000 MG tablet Take 1,000 mg by mouth   • Calcium Carb-Cholecalciferol (Calcium 1000 + D) 1000-800 MG-UNIT TABS Take 600 mg by mouth 2 (two) times a day   • CALCIUM MAGNESIUM 750 PO Take 2 tablets by mouth in the morning   • capecitabine (XELODA) 500 MG tablet 500 mg 2 (two) times a day On 2 weeks off 1 week.   2 tabs bid   • Cholecalciferol (VITAMIN D3) 3000 units TABS Take by mouth   • Cyanocobalamin (VITAMIN B 12 PO) Take by mouth   • folic acid (FOLVITE) 1 mg tablet Take by mouth daily   • ketorolac (ACULAR) 0.5 % ophthalmic solution    • Magnesium 400 MG TABS Take 1 tablet by mouth in the morning   • Multiple Vitamins-Minerals (CENTRUM SILVER PO) Take by mouth   • potassium chloride (Klor-Con) 10 mEq tablet      No Known Allergies  Immunization History   Administered Date(s) Administered   • COVID-19 MODERNA VACC 0.5 ML IM 01/21/2021, 02/19/2021, 10/25/2021   • COVID-19, unspecified 04/25/2022   • INFLUENZA 10/22/2017, 10/27/2022   • Influenza, high dose seasonal 0.7 mL 10/20/2020, 11/15/2021, 10/27/2022   • Influenza, injectable, quadrivalent, preservative free 0.5 mL 10/31/2019       Objective     /74   Pulse 78   Ht 6' (1.829 m)   Wt 80.7 kg (178 lb)   SpO2 98%   BMI 24.14 kg/m²     Physical Exam  Vitals and nursing note reviewed. Constitutional:       Appearance: Normal appearance. He is well-developed. He is not ill-appearing. HENT:      Head: Normocephalic and atraumatic. Right Ear: External ear normal.      Left Ear: External ear normal.   Eyes:      General: Lids are normal.         Right eye: No discharge. Left eye: No discharge. Conjunctiva/sclera: Conjunctivae normal.   Neck:      Thyroid: No thyroid mass or thyromegaly. Vascular: No carotid bruit or JVD. Trachea: Trachea normal.   Cardiovascular:      Rate and Rhythm: Regular rhythm. Pulses: Normal pulses. Heart sounds: Normal heart sounds. Pulmonary:      Effort: Pulmonary effort is normal. No respiratory distress. Breath sounds: Normal breath sounds. No wheezing. Abdominal:      General: There is no distension. There are no signs of injury. Palpations: There is no hepatomegaly or splenomegaly. Tenderness: There is no abdominal tenderness. Hernia: No hernia is present. Musculoskeletal:      Cervical back: No rigidity or tenderness. Right lower leg: No edema. Left lower leg: No edema. Lymphadenopathy:      Cervical: No cervical adenopathy. Skin:     General: Skin is warm. Coloration: Skin is not jaundiced or pale. Neurological:      General: No focal deficit present. Mental Status: He is alert and oriented to person, place, and time. Motor: No weakness, tremor or atrophy. Gait: Gait normal.      Deep Tendon Reflexes: Reflexes are normal and symmetric. Psychiatric:         Mood and Affect: Mood normal. Mood is not anxious.          Speech: Speech normal.         Behavior: Behavior normal. Behavior is not agitated, aggressive, withdrawn, hyperactive or combative. Thought Content: Thought content normal. Thought content is not paranoid or delusional. Thought content does not include homicidal or suicidal ideation. Thought content does not include suicidal plan.          Judgment: Judgment normal.       Dylon Elise MD

## 2023-10-31 NOTE — ASSESSMENT & PLAN NOTE
Recently had blood test with Dr Leonor Sparks,  Neutropenia chronic, base line,    Ferritin down 208 per pt hb is fair

## 2023-10-31 NOTE — ASSESSMENT & PLAN NOTE
11-22-22: Chol 184, tg 76 hdl 71 ldl 98  Hba1c 6.0    Continue carb control diet    Will order hba1c and lipid with next blood test

## 2024-01-30 ENCOUNTER — HOSPITAL ENCOUNTER (EMERGENCY)
Facility: HOSPITAL | Age: 79
Discharge: HOME | End: 2024-01-30
Attending: EMERGENCY MEDICINE
Payer: COMMERCIAL

## 2024-01-30 ENCOUNTER — TELEPHONE (OUTPATIENT)
Age: 79
End: 2024-01-30

## 2024-01-30 VITALS
OXYGEN SATURATION: 100 % | RESPIRATION RATE: 18 BRPM | SYSTOLIC BLOOD PRESSURE: 156 MMHG | DIASTOLIC BLOOD PRESSURE: 70 MMHG | TEMPERATURE: 98.2 F | HEART RATE: 79 BPM

## 2024-01-30 DIAGNOSIS — R23.8 SKIN IRRITATION: Primary | ICD-10-CM

## 2024-01-30 PROCEDURE — 99283 EMERGENCY DEPT VISIT LOW MDM: CPT | Performed by: EMERGENCY MEDICINE

## 2024-01-30 PROCEDURE — 99283 EMERGENCY DEPT VISIT LOW MDM: CPT

## 2024-01-30 NOTE — TELEPHONE ENCOUNTER
Rec'd call from patient requesting NEW for SKIN IRRITATION.     Wait list process was explained and understanding was verbalized.     Created wait list for patient.

## 2024-01-30 NOTE — ED PROVIDER NOTES
History  Chief Complaint   Patient presents with    Hand Pain     Patient reports he cut his right hand between his thumb and first finger a few months ago, now is developing a callous that is painful       Hand Pain    78-year-old presented to the ER for evaluation of skin hardening between his thumb and second digit.  Had injury previously there few months ago.  States whenever he touches something with that area it hurts.  Asking if I can excise the hardened skin.  No erythema.  No fevers.  No bleeding.    Prior to Admission Medications   Prescriptions Last Dose Informant Patient Reported? Taking?   CALCIUM MAGNESIUM 750 PO   Yes No   Sig: Take 2 tablets by mouth in the morning   Calcium Carb-Cholecalciferol (Calcium 1000 + D) 1000-800 MG-UNIT TABS   Yes No   Sig: Take 600 mg by mouth 2 (two) times a day   Cholecalciferol (VITAMIN D3) 3000 units TABS  Self Yes No   Sig: Take by mouth   Cyanocobalamin (VITAMIN B 12 PO)  Self Yes No   Sig: Take by mouth   Magnesium 400 MG TABS   Yes No   Sig: Take 1 tablet by mouth in the morning   Multiple Vitamins-Minerals (CENTRUM SILVER PO)  Self Yes No   Sig: Take by mouth   ascorbic acid (VITAMIN C) 1000 MG tablet   Yes No   Sig: Take 1,000 mg by mouth   capecitabine (XELODA) 500 MG tablet  Self Yes No   Si mg 2 (two) times a day On 2 weeks off 1 week.  2 tabs bid   folic acid (FOLVITE) 1 mg tablet  Self Yes No   Sig: Take by mouth daily   ketorolac (ACULAR) 0.5 % ophthalmic solution   Yes No   potassium chloride (Klor-Con) 10 mEq tablet   Yes No      Facility-Administered Medications: None       Past Medical History:   Diagnosis Date    Cancer (HCC)     rectal    Heart murmur     High cholesterol     HLD (hyperlipidemia)     border line    Hx of radiation therapy        Past Surgical History:   Procedure Laterality Date    COLONOSCOPY      COLOSTOMY TAKEDOWN/REVERSE ANTON  2018    6 months after    HARTMANS PROCEDURE  2018    IR BALLOON-OCCLUDED ANTEGRADE TRANSVENOUS  OBLITERATION (BATO)  5/3/2021    IR PORT REMOVAL  2021    LIVER BIOPSY      in 2017    PORTACATH PLACEMENT      in 2016    IA RPR 1ST INGUN HRNA AGE 5 YRS/> REDUCIBLE Left 2021    Procedure: REPAIR HERNIA INGUINAL WITH MESH;  Surgeon: Saulo Rosales MD;  Location: [facility];  Service: General       Family History   Problem Relation Age of Onset    No Known Problems Mother     No Known Problems Father      I have reviewed and agree with the history as documented.    E-Cigarette/Vaping    E-Cigarette Use Never User      E-Cigarette/Vaping Substances    Nicotine No     THC No     CBD No     Flavoring No     Other No     Unknown No      Social History     Tobacco Use    Smoking status: Former     Current packs/day: 0.00     Average packs/day: 1 pack/day for 10.0 years (10.0 ttl pk-yrs)     Types: Cigarettes     Start date:      Quit date: 2000     Years since quittin.0    Smokeless tobacco: Never    Tobacco comments:     quit 20 years ago   Vaping Use    Vaping status: Never Used   Substance Use Topics    Alcohol use: Yes     Comment: occasionally.     Drug use: Never       Review of Systems   All other systems reviewed and are negative.      Physical Exam  Physical Exam  Vitals reviewed.   Constitutional:       Appearance: Normal appearance. He is well-developed.   HENT:      Head: Normocephalic and atraumatic.   Eyes:      Extraocular Movements: Extraocular movements intact.      Pupils: Pupils are equal, round, and reactive to light.   Cardiovascular:      Rate and Rhythm: Normal rate and regular rhythm.   Pulmonary:      Effort: Pulmonary effort is normal. No respiratory distress.   Musculoskeletal:         General: No swelling or tenderness. Normal range of motion.      Cervical back: Normal range of motion and neck supple.   Skin:     General: Skin is warm and dry.   Neurological:      General: No focal deficit present.      Mental Status: He is alert and oriented to person, place, and time.          Vital Signs  ED Triage Vitals [01/30/24 1312]   Temperature Pulse Respirations Blood Pressure SpO2   98.2 °F (36.8 °C) 79 18 156/70 100 %      Temp Source Heart Rate Source Patient Position - Orthostatic VS BP Location FiO2 (%)   Oral Monitor Sitting Left arm --      Pain Score       No Pain           Vitals:    01/30/24 1312   BP: 156/70   Pulse: 79   Patient Position - Orthostatic VS: Sitting         Visual Acuity      ED Medications  Medications - No data to display    Diagnostic Studies  Results Reviewed       None                   No orders to display              Procedures  Procedures         ED Course                                             Medical Decision Making  78-year-old with hardening of skin, callus.  Discussed following up with dermatology.  Keep area moisturized.  Patient verbalized understanding of instructions.             Disposition  Final diagnoses:   Skin irritation     Time reflects when diagnosis was documented in both MDM as applicable and the Disposition within this note       Time User Action Codes Description Comment    1/30/2024  1:55 PM Isabel Green Add [R23.8] Skin irritation           ED Disposition       ED Disposition   Discharge    Condition   Stable    Date/Time   Tue Jan 30, 2024  1:55 PM    Comment   Asif Mai discharge to home/self care.                   Follow-up Information       Follow up With Specialties Details Why Contact Info Additional Information    Marco Scanlon MD Family Medicine Schedule an appointment as soon as possible for a visit   [address] [address]  [phone]       [facility] Emergency Department Emergency Medicine  As needed, If symptoms worsen [address] [facility] [id number] [phone] [facility] Emergency Department, [facility] [place], [place], [address]    [facility] Schedule  an appointment as soon as possible for a visit   [address] [facility]  [address]  [facility] [id number] [phone] [facility] [place], [address] [facility], [facility] [address], [place], [place], [address] [phone]            Discharge Medication List as of 1/30/2024  1:56 PM        CONTINUE these medications which have NOT CHANGED    Details   ascorbic acid (VITAMIN C) 1000 MG tablet Take 1,000 mg by mouth, Historical Med      Calcium Carb-Cholecalciferol (Calcium 1000 + D) 1000-800 MG-UNIT TABS Take 600 mg by mouth 2 (two) times a day, Historical Med      CALCIUM MAGNESIUM 750 PO Take 2 tablets by mouth in the morning, Historical Med      capecitabine (XELODA) 500 MG tablet 500 mg 2 (two) times a day On 2 weeks off 1 week.  2 tabs bid, Historical Med      Cholecalciferol (VITAMIN D3) 3000 units TABS Take by mouth, Historical Med      Cyanocobalamin (VITAMIN B 12 PO) Take by mouth, Historical Med      folic acid (FOLVITE) 1 mg tablet Take by mouth daily, Historical Med      ketorolac (ACULAR) 0.5 % ophthalmic solution Starting Wed 5/10/2023, Historical Med      Magnesium 400 MG TABS Take 1 tablet by mouth in the morning, Historical Med      Multiple Vitamins-Minerals (CENTRUM SILVER PO) Take by mouth, Historical Med      potassium chloride (Klor-Con) 10 mEq tablet Historical Med                 PDMP Review         Value Time User    PDMP Reviewed  Yes 5/4/2021  2:56 PM Saulo Rosales MD            ED Provider  Electronically Signed by             Isabel Green MD  01/30/24 9589

## 2024-02-05 ENCOUNTER — OFFICE VISIT (OUTPATIENT)
Dept: INTERNAL MEDICINE CLINIC | Facility: CLINIC | Age: 79
End: 2024-02-05
Payer: COMMERCIAL

## 2024-02-05 VITALS
HEART RATE: 62 BPM | HEIGHT: 72 IN | OXYGEN SATURATION: 100 % | DIASTOLIC BLOOD PRESSURE: 68 MMHG | WEIGHT: 178 LBS | SYSTOLIC BLOOD PRESSURE: 120 MMHG | BODY MASS INDEX: 24.11 KG/M2

## 2024-02-05 DIAGNOSIS — C22.9 MALIGNANT NEOPLASM OF LIVER, UNSPECIFIED LIVER MALIGNANCY TYPE (HCC): ICD-10-CM

## 2024-02-05 DIAGNOSIS — T45.1X5A CHEMOTHERAPY-INDUCED NEUTROPENIA: ICD-10-CM

## 2024-02-05 DIAGNOSIS — D69.6 THROMBOCYTOPENIA (HCC): ICD-10-CM

## 2024-02-05 DIAGNOSIS — E83.110 HEREDITARY HEMOCHROMATOSIS (HCC): Primary | ICD-10-CM

## 2024-02-05 DIAGNOSIS — D70.1 CHEMOTHERAPY-INDUCED NEUTROPENIA: ICD-10-CM

## 2024-02-05 PROCEDURE — 99213 OFFICE O/P EST LOW 20 MIN: CPT | Performed by: INTERNAL MEDICINE

## 2024-02-05 NOTE — ASSESSMENT & PLAN NOTE
Patient undergoing phlebotomy once a month and monitored by oncology Dr. Webber with ferritin serum iron percentage saturation previous labs reviewed stable continue follow-up with the oncology and phlebotomy symptomatic

## 2024-02-05 NOTE — ASSESSMENT & PLAN NOTE
Patient followed by oncologist on Xeloda last WBC done by Dr. Junie scherer at baseline reviewed  WBC reviewed 3.6 stable asymptomatic

## 2024-02-05 NOTE — PROGRESS NOTES
Dr. Demarco's Office Visit Note  24     Asif Mai 78 y.o. male MRN: [id number]  : 1945    Assessment:     1. Hereditary hemochromatosis (HCC)  Assessment & Plan:  Patient undergoing phlebotomy once a month and monitored by oncology Dr. Webber with ferritin serum iron percentage saturation previous labs reviewed stable continue follow-up with the oncology and phlebotomy symptomatic      2. 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  Remains on chemotherapy oral.  Neutropenia is better.  Platelet low normal       3. Chemotherapy-induced neutropenia   Assessment & Plan:  Patient followed by oncologist on Xeloda last WBC done by Dr. Junie scherer at baseline reviewed  WBC reviewed 3.6 stable asymptomatic      4. Thrombocytopenia (HCC)  Assessment & Plan:  Last platelet count reviewed done by Dr. Mayo oncology stable 147,000 reviewed no evidence of any bleeding            Discussion Summary and Plan:  Today's care plan and medications were reviewed with patient in detail and all their questions answered to their satisfaction.    Chief Complaint   Patient presents with   • Follow-up      Subjective:  Came in follow-up for the chronic medical condition listed under visit diagnosis denies any chest pain difficulty breathing patient followed by oncology for metastatic carcinoma of the colon in remission all the blood work reviewed no chest pain difficulty breathing for details refer to assessment plan under visit diagnosis        The following portions of the patient's history were reviewed and updated as appropriate: allergies, current medications, past family history, past medical history, past social history, past surgical history and problem list.    Review of Systems   Constitutional:  Positive for activity change. Negative for appetite change, chills, diaphoresis, fatigue, fever and unexpected weight  change.   HENT:  Negative for congestion, dental problem, drooling, ear discharge, ear pain, facial swelling, hearing loss, mouth sores, nosebleeds, postnasal drip, rhinorrhea, sinus pressure, sneezing, sore throat, tinnitus, trouble swallowing and voice change.    Eyes:  Negative for photophobia, pain, discharge, redness, itching and visual disturbance.   Respiratory:  Negative for apnea, cough, choking, chest tightness, shortness of breath, wheezing and stridor.    Cardiovascular:  Negative for chest pain, palpitations and leg swelling.   Gastrointestinal:  Negative for abdominal distention, abdominal pain, anal bleeding, blood in stool, constipation, diarrhea, nausea, rectal pain and vomiting.   Endocrine: Negative for cold intolerance, heat intolerance, polydipsia, polyphagia and polyuria.   Genitourinary:  Negative for decreased urine volume, difficulty urinating, dysuria, enuresis, flank pain, frequency, genital sores, hematuria and urgency.   Musculoskeletal:  Positive for arthralgias. Negative for back pain, gait problem, joint swelling, myalgias, neck pain and neck stiffness.   Skin:  Negative for color change, pallor, rash and wound.   Allergic/Immunologic: Negative.  Negative for environmental allergies, food allergies and immunocompromised state.   Neurological:  Negative for dizziness, tremors, seizures, syncope, facial asymmetry, speech difficulty, weakness, light-headedness, numbness and headaches.   Psychiatric/Behavioral:  Negative for agitation, behavioral problems, confusion, decreased concentration, dysphoric mood, hallucinations, self-injury, sleep disturbance and suicidal ideas. The patient is not nervous/anxious and is not hyperactive.          Historical Information   Patient Active Problem List   Diagnosis   • Hyperglycemia   • Hypertension   • Metastatic colorectal cancer   • Hypercholesteremia   • Bruise of both arms   • Hereditary hemochromatosis (HCC)   • Vitamin D deficiency   •  Osteopenia   • Bleeding per rectum   • Incisional hernia with obstruction but no gangrene   • Left inguinal hernia   • Right inguinal hernia   • Other constipation   • Encounter for surgical follow-up care   • AV (angiodysplasia malformation of colon)   • Edema of right lower leg   • Neuropathy   • Chemotherapy-induced neutropenia    • Age-related cataract   • Thrombocytopenia (HCC)   • Malignant neoplasm of liver, unspecified liver malignancy type (HCC)   • Nuclear sclerotic cataract   • Primary open angle glaucoma of both eyes, moderate stage     Past Medical History:   Diagnosis Date   • Cancer (HCC)     rectal   • Heart murmur    • High cholesterol    • HLD (hyperlipidemia)     border line   • Hx of radiation therapy      Past Surgical History:   Procedure Laterality Date   • COLONOSCOPY     • COLOSTOMY TAKEDOWN/REVERSE ANTON  2018    6 months after   • HARTMANS PROCEDURE     • IR BALLOON-OCCLUDED ANTEGRADE TRANSVENOUS OBLITERATION (BATO)  5/3/2021   • IR PORT REMOVAL  2021   • LIVER BIOPSY      in 2017   • PORTACATH PLACEMENT      in    • NV RPR 1ST INGUN HRNA AGE 5 YRS/> REDUCIBLE Left 2021    Procedure: REPAIR HERNIA INGUINAL WITH MESH;  Surgeon: Saulo Rosales MD;  Location: [facility];  Service: General     Social History     Substance and Sexual Activity   Alcohol Use Yes    Comment: occasionally.      Social History     Substance and Sexual Activity   Drug Use Never     Social History     Tobacco Use   Smoking Status Former   • Current packs/day: 0.00   • Average packs/day: 1 pack/day for 10.0 years (10.0 ttl pk-yrs)   • Types: Cigarettes   • Start date:    • Quit date:    • Years since quittin.1   Smokeless Tobacco Never   Tobacco Comments    quit 20 years ago     Family History   Problem Relation Age of Onset   • No Known Problems Mother    • No Known Problems Father      Health Maintenance Due   Topic   • Pneumococcal Vaccine: 65+ Years (1 - PCV)   • Zoster Vaccine (1  of 2)   • Influenza Vaccine (1)   • COVID-19 Vaccine (5 - 2023-24 season)   • Depression Screening       Meds/Allergies       Current Outpatient Medications:   •  ascorbic acid (VITAMIN C) 1000 MG tablet, Take 1,000 mg by mouth, Disp: , Rfl:   •  Calcium Carb-Cholecalciferol (Calcium 1000 + D) 1000-800 MG-UNIT TABS, Take 600 mg by mouth 2 (two) times a day, Disp: , Rfl:   •  CALCIUM MAGNESIUM 750 PO, Take 2 tablets by mouth in the morning, Disp: , Rfl:   •  capecitabine (XELODA) 500 MG tablet, 500 mg 2 (two) times a day On 2 weeks off 1 week.  2 tabs bid, Disp: , Rfl:   •  Cholecalciferol (VITAMIN D3) 3000 units TABS, Take by mouth, Disp: , Rfl:   •  Cyanocobalamin (VITAMIN B 12 PO), Take by mouth, Disp: , Rfl:   •  folic acid (FOLVITE) 1 mg tablet, Take by mouth daily, Disp: , Rfl:   •  ketorolac (ACULAR) 0.5 % ophthalmic solution, , Disp: , Rfl:   •  Magnesium 400 MG TABS, Take 1 tablet by mouth in the morning, Disp: , Rfl:   •  Multiple Vitamins-Minerals (CENTRUM SILVER PO), Take by mouth, Disp: , Rfl:   •  potassium chloride (Klor-Con) 10 mEq tablet, , Disp: , Rfl:       Objective:    Vitals:   /68   Pulse 62   Ht 6' (1.829 m)   Wt 80.7 kg (178 lb)   SpO2 100%   BMI 24.14 kg/m²   Body mass index is 24.14 kg/m².  Vitals:    02/05/24 0853   Weight: 80.7 kg (178 lb)       Physical Exam  Vitals and nursing note reviewed.   Constitutional:       General: He is not in acute distress.     Appearance: He is well-developed. He is not ill-appearing, toxic-appearing or diaphoretic.   HENT:      Head: Normocephalic and atraumatic.      Right Ear: External ear normal.      Left Ear: External ear normal.      Nose: Nose normal.      Mouth/Throat:      Pharynx: No oropharyngeal exudate.   Eyes:      General: Lids are normal. Lids are everted, no foreign bodies appreciated. No scleral icterus.        Right eye: No discharge.         Left eye: No discharge.      Conjunctiva/sclera: Conjunctivae normal.      Pupils:  Pupils are equal, round, and reactive to light.   Neck:      Thyroid: No thyromegaly.      Vascular: Normal carotid pulses. No carotid bruit, hepatojugular reflux or JVD.      Trachea: No tracheal tenderness or tracheal deviation.   Cardiovascular:      Rate and Rhythm: Normal rate and regular rhythm.      Pulses: Normal pulses.      Heart sounds: Normal heart sounds. No murmur heard.     No friction rub. No gallop.   Pulmonary:      Effort: Pulmonary effort is normal. No respiratory distress.      Breath sounds: Normal breath sounds. No stridor. No wheezing or rales.   Chest:      Chest wall: No tenderness.   Abdominal:      General: Bowel sounds are normal. There is no distension.      Palpations: Abdomen is soft. There is no mass.      Tenderness: There is no abdominal tenderness. There is no guarding or rebound.   Musculoskeletal:         General: No tenderness or deformity. Normal range of motion.      Cervical back: Normal range of motion and neck supple. No edema, erythema or rigidity. No spinous process tenderness or muscular tenderness. Normal range of motion.   Lymphadenopathy:      Head:      Right side of head: No submental, submandibular, tonsillar, preauricular or posterior auricular adenopathy.      Left side of head: No submental, submandibular, tonsillar, preauricular, posterior auricular or occipital adenopathy.      Cervical: No cervical adenopathy.      Right cervical: No superficial, deep or posterior cervical adenopathy.     Left cervical: No superficial, deep or posterior cervical adenopathy.      Upper Body:      Right upper body: No pectoral adenopathy.      Left upper body: No pectoral adenopathy.   Skin:     General: Skin is warm and dry.      Coloration: Skin is not pale.      Findings: No erythema or rash.   Neurological:      General: No focal deficit present.      Mental Status: He is alert and oriented to person, place, and time.      Cranial Nerves: No cranial nerve deficit.       "Sensory: No sensory deficit.      Motor: No tremor, abnormal muscle tone or seizure activity.      Coordination: Coordination normal.      Gait: Gait normal.      Deep Tendon Reflexes: Reflexes are normal and symmetric. Reflexes normal.   Psychiatric:         Behavior: Behavior normal.         Thought Content: Thought content normal.         Judgment: Judgment normal.         Lab Review   No visits with results within 2 Month(s) from this visit.   Latest known visit with results is:   Admission on 03/09/2023, Discharged on 03/09/2023   Component Date Value Ref Range Status   • Color, UA 03/09/2023 Yellow   Final   • Clarity, UA 03/09/2023 Clear   Final   • Specific Gravity, UA 03/09/2023 1.020  1.000 - 1.030 Final   • pH, UA 03/09/2023 5.5  5.0, 5.5, 6.0, 6.5, 7.0, 7.5, 8.0, 8.5, 9.0 Final   • Leukocytes, UA 03/09/2023 Negative  Negative Final   • Nitrite, UA 03/09/2023 Negative  Negative Final   • Protein, UA 03/09/2023 Negative  Negative mg/dl Final   • Glucose, UA 03/09/2023 Negative  Negative mg/dl Final   • Ketones, UA 03/09/2023 Trace (A)  Negative mg/dl Final   • Urobilinogen, UA 03/09/2023 0.2  0.2, 1.0 E.U./dl E.U./dl Final   • Bilirubin, UA 03/09/2023 Negative  Negative Final   • Occult Blood, UA 03/09/2023 Negative  Negative Final         Patient Instructions   Pt is symptom free for this problem.  This diagnosis or problem is stable/well controlled  Jimi Demarco MD        \"This note has been constructed using a voice recognition system.Therefore there may be syntax, spelling, and/or grammatical errors. Please call if you have any questions. \"  "

## 2024-02-05 NOTE — ASSESSMENT & PLAN NOTE
Metastatic colorectal surgeon has been stable.  Remains on chemotherapy oral.  Neutropenia is better.  Platelet low normal

## 2024-02-05 NOTE — ASSESSMENT & PLAN NOTE
Last platelet count reviewed done by Dr. Mayo oncology stable 147,000 reviewed no evidence of any bleeding

## 2024-04-16 ENCOUNTER — TELEPHONE (OUTPATIENT)
Dept: GASTROENTEROLOGY | Facility: CLINIC | Age: 79
End: 2024-04-16

## 2024-06-17 ENCOUNTER — OFFICE VISIT (OUTPATIENT)
Dept: INTERNAL MEDICINE CLINIC | Facility: CLINIC | Age: 79
End: 2024-06-17
Payer: COMMERCIAL

## 2024-06-17 VITALS
BODY MASS INDEX: 24.24 KG/M2 | DIASTOLIC BLOOD PRESSURE: 70 MMHG | HEIGHT: 72 IN | SYSTOLIC BLOOD PRESSURE: 128 MMHG | HEART RATE: 70 BPM | OXYGEN SATURATION: 99 % | WEIGHT: 179 LBS

## 2024-06-17 DIAGNOSIS — E83.110 HEREDITARY HEMOCHROMATOSIS (HCC): Primary | ICD-10-CM

## 2024-06-17 DIAGNOSIS — R73.9 HYPERGLYCEMIA: ICD-10-CM

## 2024-06-17 DIAGNOSIS — E55.9 VITAMIN D DEFICIENCY: ICD-10-CM

## 2024-06-17 DIAGNOSIS — D69.6 THROMBOCYTOPENIA (HCC): ICD-10-CM

## 2024-06-17 DIAGNOSIS — I10 PRIMARY HYPERTENSION: ICD-10-CM

## 2024-06-17 DIAGNOSIS — Z13.0 SCREENING FOR DEFICIENCY ANEMIA: ICD-10-CM

## 2024-06-17 DIAGNOSIS — E78.00 HYPERCHOLESTEREMIA: ICD-10-CM

## 2024-06-17 PROCEDURE — 99214 OFFICE O/P EST MOD 30 MIN: CPT | Performed by: INTERNAL MEDICINE

## 2024-06-17 PROCEDURE — G2211 COMPLEX E/M VISIT ADD ON: HCPCS | Performed by: INTERNAL MEDICINE

## 2024-06-17 NOTE — ASSESSMENT & PLAN NOTE
Lab Results   Component Value Date    LDLCALC 106 (H) 12/21/2021   Above reviewed not on any statin    Continue low-fat low-cholesterol diet    Will check lipid profile before next visit

## 2024-06-17 NOTE — ASSESSMENT & PLAN NOTE
Previous A1c has been borderline last A1c done November 22 was 6.0 thereafter was 5.8    Continue diabetic diet    Check A1c before next visit

## 2024-06-17 NOTE — ASSESSMENT & PLAN NOTE
Asymptomatic    Blood pressure very well-controlled    Low-salt diet    No further intervention needed

## 2024-06-17 NOTE — PATIENT INSTRUCTIONS
Pt is symptom free for this problem.  This diagnosis or problem is stable/well controlled  Patient is advised to continue same meds as outlined in medicine list  Pt is advised to continue to follow with specialist if they are following for this problme  Pt should get blood test or other testing as per specialist ( or myself) prior to your next visit.

## 2024-06-17 NOTE — ASSESSMENT & PLAN NOTE
Patient undergoing phlebotomy once a month and monitored by oncology Dr. Webber with ferritin serum iron percentage saturation previous labs reviewed stable continue follow-up with the oncology and phlebotomy symptomatic    Were reviewed asymptomatic    Awaiting repeat CBC awaiting to be seen by hematology and iron studies    Will check CMP

## 2024-06-17 NOTE — PROGRESS NOTES
Dr. Demarco's Office Visit Note  24     Asif Mai 78 y.o. male MRN: [id number]  : 1945    Assessment:     1. Hereditary hemochromatosis (HCC)  Assessment & Plan:  Patient undergoing phlebotomy once a month and monitored by oncology Dr. Webber with ferritin serum iron percentage saturation previous labs reviewed stable continue follow-up with the oncology and phlebotomy symptomatic    Were reviewed asymptomatic    Awaiting repeat CBC awaiting to be seen by hematology and iron studies    Will check CMP  Orders:  -     Comprehensive metabolic panel; Future  -     Albumin / creatinine urine ratio; Future  -     Urinalysis with microscopic; Future  2. Screening for deficiency anemia  3. Thrombocytopenia (HCC)  Assessment & Plan:  Stable at 147,000    Asymptomatic    No need for intervention    Awaiting to be seen by hematologist and a repeat CBC  Orders:  -     Comprehensive metabolic panel; Future  -     Albumin / creatinine urine ratio; Future  4. Hyperglycemia  Assessment & Plan:  Previous A1c has been borderline last A1c done  was 6.0 thereafter was 5.8    Continue diabetic diet    Check A1c before next visit  Orders:  -     Hemoglobin A1C; Future  -     Albumin / creatinine urine ratio; Future  -     Urinalysis with microscopic; Future  5. Hypercholesteremia  Assessment & Plan:  Lab Results   Component Value Date    LDLCALC 106 (H) 2021   Above reviewed not on any statin    Continue low-fat low-cholesterol diet    Will check lipid profile before next visit  6. Vitamin D deficiency  -     Vitamin D 25 hydroxy; Future; Expected date: 2024  7. Primary hypertension  Assessment & Plan:  Asymptomatic    Blood pressure very well-controlled    Low-salt diet    No further intervention needed        Discussion Summary and Plan:  Today's care plan and medications were reviewed with patient in detail and all their questions answered to their satisfaction.    Chief Complaint   Patient  presents with   • Follow-up      Subjective:  Came in follow-up chronic medical concerns listed visit diagnosis followed by hematologist for primary hemochromatosis undergoing phlebotomy awaiting to be seen by hematology no chest pain no difficulty breathing patient was recommended to have a complete blood work today for detail refer to assessment plan visit diagnosis        The following portions of the patient's history were reviewed and updated as appropriate: allergies, current medications, past family history, past medical history, past social history, past surgical history and problem list.    Review of Systems   Constitutional:  Positive for activity change. Negative for appetite change, chills, diaphoresis, fatigue, fever and unexpected weight change.   HENT:  Negative for congestion, dental problem, drooling, ear discharge, ear pain, facial swelling, hearing loss, mouth sores, nosebleeds, postnasal drip, rhinorrhea, sinus pressure, sneezing, sore throat, tinnitus, trouble swallowing and voice change.    Eyes:  Negative for photophobia, pain, discharge, redness, itching and visual disturbance.   Respiratory:  Negative for apnea, cough, choking, chest tightness, shortness of breath, wheezing and stridor.    Cardiovascular:  Negative for chest pain, palpitations and leg swelling.   Gastrointestinal:  Negative for abdominal distention, abdominal pain, anal bleeding, blood in stool, constipation, diarrhea, nausea, rectal pain and vomiting.   Endocrine: Negative for cold intolerance, heat intolerance, polydipsia, polyphagia and polyuria.   Genitourinary:  Negative for decreased urine volume, difficulty urinating, dysuria, enuresis, flank pain, frequency, genital sores, hematuria and urgency.   Musculoskeletal:  Positive for arthralgias. Negative for back pain, gait problem, joint swelling, myalgias, neck pain and neck stiffness.   Skin:  Negative for color change, pallor, rash and wound.   Allergic/Immunologic:  Negative.  Negative for environmental allergies, food allergies and immunocompromised state.   Neurological:  Negative for dizziness, tremors, seizures, syncope, facial asymmetry, speech difficulty, weakness, light-headedness, numbness and headaches.   Psychiatric/Behavioral:  Negative for agitation, behavioral problems, confusion, decreased concentration, dysphoric mood, hallucinations, self-injury, sleep disturbance and suicidal ideas. The patient is not nervous/anxious and is not hyperactive.          Historical Information   Patient Active Problem List   Diagnosis   • Hyperglycemia   • Hypertension   • Metastatic colorectal cancer   • Hypercholesteremia   • Bruise of both arms   • Hereditary hemochromatosis (HCC)   • Vitamin D deficiency   • Osteopenia   • Bleeding per rectum   • Incisional hernia with obstruction but no gangrene   • Left inguinal hernia   • Right inguinal hernia   • Other constipation   • Encounter for surgical follow-up care   • AV (angiodysplasia malformation of colon)   • Edema of right lower leg   • Neuropathy   • Chemotherapy-induced neutropenia (HCC)   • Age-related cataract   • Thrombocytopenia (HCC)   • Malignant neoplasm of liver, unspecified liver malignancy type (HCC)   • Nuclear sclerotic cataract   • Primary open angle glaucoma of both eyes, moderate stage     Past Medical History:   Diagnosis Date   • Cancer (HCC)     rectal   • Heart murmur    • High cholesterol    • HLD (hyperlipidemia)     border line   • Hx of radiation therapy      Past Surgical History:   Procedure Laterality Date   • COLONOSCOPY     • COLOSTOMY TAKEDOWN/REVERSE ANTON  2018    6 months after   • HARTMANS PROCEDURE  2018   • IR BALLOON-OCCLUDED ANTEGRADE TRANSVENOUS OBLITERATION (BATO)  5/3/2021   • IR PORT REMOVAL  5/7/2021   • LIVER BIOPSY      in 5/2017   • PORTACATH PLACEMENT      in 2016   • IA RPR 1ST INGUN HRNA AGE 5 YRS/> REDUCIBLE Left 5/4/2021    Procedure: REPAIR HERNIA INGUINAL WITH MESH;   Surgeon: Saulo Rosales MD;  Location: [facility];  Service: General     Social History     Substance and Sexual Activity   Alcohol Use Yes    Comment: occasionally.      Social History     Substance and Sexual Activity   Drug Use Never     Social History     Tobacco Use   Smoking Status Former   • Current packs/day: 0.00   • Average packs/day: 1 pack/day for 10.0 years (10.0 ttl pk-yrs)   • Types: Cigarettes   • Start date:    • Quit date:    • Years since quittin.4   Smokeless Tobacco Never   Tobacco Comments    quit 20 years ago     Family History   Problem Relation Age of Onset   • No Known Problems Mother    • No Known Problems Father      Health Maintenance Due   Topic   • Pneumococcal Vaccine: 65+ Years (1 of 2 - PCV)   • Zoster Vaccine (1 of 2)   • RSV Vaccine Age 60+ Years (1 - 1-dose 60+ series)   • COVID-19 Vaccine ( season)   • Fall Risk    • Medicare Annual Wellness Visit (AWV)    • Influenza Vaccine (Season Ended)      Meds/Allergies       Current Outpatient Medications:   •  ascorbic acid (VITAMIN C) 1000 MG tablet, Take 1,000 mg by mouth, Disp: , Rfl:   •  Calcium Carb-Cholecalciferol (Calcium 1000 + D) 1000-800 MG-UNIT TABS, Take 600 mg by mouth 2 (two) times a day, Disp: , Rfl:   •  CALCIUM MAGNESIUM 750 PO, Take 2 tablets by mouth in the morning, Disp: , Rfl:   •  capecitabine (XELODA) 500 MG tablet, 500 mg 2 (two) times a day On 2 weeks off 1 week.  2 tabs bid, Disp: , Rfl:   •  Cholecalciferol (VITAMIN D3) 3000 units TABS, Take by mouth, Disp: , Rfl:   •  Cyanocobalamin (VITAMIN B 12 PO), Take by mouth, Disp: , Rfl:   •  folic acid (FOLVITE) 1 mg tablet, Take by mouth daily, Disp: , Rfl:   •  Magnesium 400 MG TABS, Take 1 tablet by mouth in the morning, Disp: , Rfl:   •  Multiple Vitamins-Minerals (CENTRUM SILVER PO), Take by mouth, Disp: , Rfl:       Objective:    Vitals:   /70   Pulse 70   Ht 6' (1.829 m)   Wt 81.2 kg (179 lb)   SpO2 99%   BMI 24.28 kg/m²    Body mass index is 24.28 kg/m².  Vitals:    06/17/24 0857   Weight: 81.2 kg (179 lb)       Physical Exam  Vitals and nursing note reviewed.   Constitutional:       General: He is not in acute distress.     Appearance: He is well-developed. He is not ill-appearing, toxic-appearing or diaphoretic.   HENT:      Head: Normocephalic and atraumatic.      Right Ear: External ear normal.      Left Ear: External ear normal.      Nose: Nose normal.      Mouth/Throat:      Pharynx: No oropharyngeal exudate.   Eyes:      General: Lids are normal. Lids are everted, no foreign bodies appreciated. No scleral icterus.        Right eye: No discharge.         Left eye: No discharge.      Conjunctiva/sclera: Conjunctivae normal.      Pupils: Pupils are equal, round, and reactive to light.   Neck:      Thyroid: No thyromegaly.      Vascular: Normal carotid pulses. No carotid bruit, hepatojugular reflux or JVD.      Trachea: No tracheal tenderness or tracheal deviation.   Cardiovascular:      Rate and Rhythm: Normal rate and regular rhythm.      Pulses: Normal pulses.      Heart sounds: Normal heart sounds. No murmur heard.     No friction rub. No gallop.   Pulmonary:      Effort: Pulmonary effort is normal. No respiratory distress.      Breath sounds: Normal breath sounds. No stridor. No wheezing or rales.   Chest:      Chest wall: No tenderness.   Abdominal:      General: Bowel sounds are normal. There is no distension.      Palpations: Abdomen is soft. There is no mass.      Tenderness: There is no abdominal tenderness. There is no guarding or rebound.   Musculoskeletal:         General: No tenderness or deformity. Normal range of motion.      Cervical back: Normal range of motion and neck supple. No edema, erythema or rigidity. No spinous process tenderness or muscular tenderness. Normal range of motion.   Lymphadenopathy:      Head:      Right side of head: No submental, submandibular, tonsillar, preauricular or posterior  auricular adenopathy.      Left side of head: No submental, submandibular, tonsillar, preauricular, posterior auricular or occipital adenopathy.      Cervical: No cervical adenopathy.      Right cervical: No superficial, deep or posterior cervical adenopathy.     Left cervical: No superficial, deep or posterior cervical adenopathy.      Upper Body:      Right upper body: No pectoral adenopathy.      Left upper body: No pectoral adenopathy.   Skin:     General: Skin is warm and dry.      Coloration: Skin is not pale.      Findings: No erythema or rash.   Neurological:      General: No focal deficit present.      Mental Status: He is alert and oriented to person, place, and time.      Cranial Nerves: No cranial nerve deficit.      Sensory: No sensory deficit.      Motor: No tremor, abnormal muscle tone or seizure activity.      Coordination: Coordination normal.      Gait: Gait normal.      Deep Tendon Reflexes: Reflexes are normal and symmetric. Reflexes normal.   Psychiatric:         Behavior: Behavior normal.         Thought Content: Thought content normal.         Judgment: Judgment normal.         Lab Review   No visits with results within 2 Month(s) from this visit.   Latest known visit with results is:   Admission on 03/09/2023, Discharged on 03/09/2023   Component Date Value Ref Range Status   • Color, UA 03/09/2023 Yellow   Final   • Clarity, UA 03/09/2023 Clear   Final   • Specific Gravity, UA 03/09/2023 1.020  1.000 - 1.030 Final   • pH, UA 03/09/2023 5.5  5.0, 5.5, 6.0, 6.5, 7.0, 7.5, 8.0, 8.5, 9.0 Final   • Leukocytes, UA 03/09/2023 Negative  Negative Final   • Nitrite, UA 03/09/2023 Negative  Negative Final   • Protein, UA 03/09/2023 Negative  Negative mg/dl Final   • Glucose, UA 03/09/2023 Negative  Negative mg/dl Final   • Ketones, UA 03/09/2023 Trace (A)  Negative mg/dl Final   • Urobilinogen, UA 03/09/2023 0.2  0.2, 1.0 E.U./dl E.U./dl Final   • Bilirubin, UA 03/09/2023 Negative  Negative Final   •  "Occult Blood, UA 03/09/2023 Negative  Negative Final         Patient Instructions   Pt is symptom free for this problem.  This diagnosis or problem is stable/well controlled  Patient is advised to continue same meds as outlined in medicine list  Pt is advised to continue to follow with specialist if they are following for this problme  Pt should get blood test or other testing as per specialist ( or myself) prior to your next visit.        Jay Demarco MD        \"This note has been constructed using a voice recognition system.Therefore there may be syntax, spelling, and/or grammatical errors. Please call if you have any questions. \"  "

## 2024-08-02 ENCOUNTER — OFFICE VISIT (OUTPATIENT)
Dept: GASTROENTEROLOGY | Facility: CLINIC | Age: 79
End: 2024-08-02
Payer: COMMERCIAL

## 2024-08-02 ENCOUNTER — TELEPHONE (OUTPATIENT)
Dept: GASTROENTEROLOGY | Facility: CLINIC | Age: 79
End: 2024-08-02

## 2024-08-02 VITALS
BODY MASS INDEX: 24.03 KG/M2 | WEIGHT: 177.4 LBS | HEIGHT: 72 IN | DIASTOLIC BLOOD PRESSURE: 59 MMHG | HEART RATE: 74 BPM | SYSTOLIC BLOOD PRESSURE: 142 MMHG

## 2024-08-02 DIAGNOSIS — D18.03 HEMANGIOMA OF LIVER: ICD-10-CM

## 2024-08-02 DIAGNOSIS — E83.110 HEREDITARY HEMOCHROMATOSIS (HCC): ICD-10-CM

## 2024-08-02 DIAGNOSIS — Z98.890 HISTORY OF COLOSTOMY REVERSAL: ICD-10-CM

## 2024-08-02 DIAGNOSIS — Z86.010 HISTORY OF ADENOMATOUS POLYP OF COLON: ICD-10-CM

## 2024-08-02 DIAGNOSIS — K43.2 INCISIONAL HERNIA WITHOUT OBSTRUCTION OR GANGRENE: ICD-10-CM

## 2024-08-02 DIAGNOSIS — Z85.048 HISTORY OF RECTAL CANCER: Primary | ICD-10-CM

## 2024-08-02 PROCEDURE — 99204 OFFICE O/P NEW MOD 45 MIN: CPT | Performed by: INTERNAL MEDICINE

## 2024-08-02 RX ORDER — POLYETHYLENE GLYCOL 3350, SODIUM SULFATE ANHYDROUS, SODIUM BICARBONATE, SODIUM CHLORIDE, POTASSIUM CHLORIDE 236; 22.74; 6.74; 5.86; 2.97 G/4L; G/4L; G/4L; G/4L; G/4L
4 POWDER, FOR SOLUTION ORAL ONCE
Qty: 4000 ML | Refills: 0 | Status: SHIPPED | OUTPATIENT
Start: 2024-08-02 | End: 2024-08-02

## 2024-08-02 NOTE — TELEPHONE ENCOUNTER
Scheduled date of colonoscopy (as of today): 10/24/24  Physician performing colonoscopy: Dr. Levy  Location of colonoscopy:   Bowel prep reviewed with patient: [email or web address]  Instructions reviewed with patient by: veronica given at appt  Clearances: n/a

## 2024-08-02 NOTE — PROGRESS NOTES
[facility] [place] - Outpatient Follow-up Note  Asif Mai 78 y.o. male MRN: [id number]  Encounter: [id number]          ASSESSMENT AND PLAN:      1. History of rectal cancer  -     Colonoscopy; Future; Expected date: 08/02/2024  -     polyethylene glycol (Golytely) 4000 mL solution; Take 4,000 mL by mouth once for 1 dose Take according to instructions given by the office for colonoscopy bowel prep.  -     Colonoscopy; Future; Expected date: 08/02/2024  -     polyethylene glycol (Golytely) 4000 mL solution; Take 4,000 mL by mouth once for 1 dose Take according to instructions given by the office for colonoscopy bowel prep.  2. Incisional hernia without obstruction or gangrene  3. Hemangioma of liver  4. History of colostomy reversal  5. Hereditary hemochromatosis (HCC)  6. History of adenomatous polyp of colon  -     Colonoscopy; Future; Expected date: 08/02/2024    Patient with history of rectal cancer radiation surgery colostomy and subsequent reversal clinically doing well.  Last colonoscopy 3 years ago was generally okay.  Surgery was about 7 years ago.  He is in stable condition, will schedule a colonoscopy for follow-up of the same.  Procedure and prep discussed at length, patient consents.    I obtained informed consent from the patient. The risks/benefits/alternatives of the procedure were discussed with the patient. Risks included, but not limited to, infection, bleeding, perforation, injury to organs in the abdomen, missed lesion and incomplete procedure were discussed. Patient was agreeable and electronic consent was signed.     I obtained informed consent from the patient. The risks/benefits/alternatives of the procedure were discussed with the patient. Risks included, but not limited to, infection, bleeding, perforation, injury to organs in the abdomen, missed lesion and incomplete procedure were discussed. Patient was agreeable and electronic consent was signed.     History  of hemangioma of the liver, hemochromatosis, getting periodic phlebotomies, I do not have those records available.  _____________________________________________________________________    SUBJECTIVE:      Patient with history of rectal cancer, had radiation and surgery 2017, clinically done well since.  Occasional constipation, blood-tinged stool, appetite is fair weight stable, denies dysphagia coughing choking spells nocturnal reflux regurgitation bronchitis pneumonias melena hematochezia.  Appetite is fair, denies chest pain shortness of breath, quite active for his age.  Also was diagnosed with hemochromatosis, goes for periodic phlebotomy.  Follows up with Dr. Zamna monitors his liver every 3 months with ultrasound in his office.  Has incisional hernia from colostomy reversal, was concerned about it but has seen doctors surgeon Bobby and felt there was no need for repair.            REVIEW OF SYSTEMS IS OTHERWISE NEGATIVE.      Historical Information   Past Medical History:   Diagnosis Date   • Cancer (HCC)     rectal   • Heart murmur    • High cholesterol    • HLD (hyperlipidemia)     border line   • Hx of radiation therapy      Past Surgical History:   Procedure Laterality Date   • COLONOSCOPY     • COLOSTOMY TAKEDOWN/REVERSE ANTON  2018    6 months after   • HARTMANS PROCEDURE  2018   • IR BALLOON-OCCLUDED ANTEGRADE TRANSVENOUS OBLITERATION (BATO)  5/3/2021   • IR PORT REMOVAL  5/7/2021   • LIVER BIOPSY      in 5/2017   • PORTACATH PLACEMENT      in 2016   • AK RPR 1ST INGUN HRNA AGE 3 YRS/> REDUCIBLE Left 5/4/2021    Procedure: REPAIR HERNIA INGUINAL WITH MESH;  Surgeon: Saulo Rosales MD;  Location: [facility];  Service: General     Social History   Social History     Substance and Sexual Activity   Alcohol Use Yes    Comment: occasionally.      Social History     Substance and Sexual Activity   Drug Use Never     Social History     Tobacco Use   Smoking Status Former   • Current packs/day: 0.00   •  Average packs/day: 1 pack/day for 10.0 years (10.0 ttl pk-yrs)   • Types: Cigarettes   • Start date:    • Quit date:    • Years since quittin.6   Smokeless Tobacco Never   Tobacco Comments    quit 20 years ago     Family History   Problem Relation Age of Onset   • No Known Problems Mother    • No Known Problems Father        Meds/Allergies       Current Outpatient Medications:   •  ascorbic acid (VITAMIN C) 1000 MG tablet  •  Calcium Carb-Cholecalciferol (Calcium 1000 + D) 1000-800 MG-UNIT TABS  •  capecitabine (XELODA) 500 MG tablet  •  Cholecalciferol (VITAMIN D3) 3000 units TABS  •  Cyanocobalamin (VITAMIN B 12 PO)  •  folic acid (FOLVITE) 1 mg tablet  •  Magnesium 400 MG TABS  •  Multiple Vitamins-Minerals (CENTRUM SILVER PO)  •  polyethylene glycol (Golytely) 4000 mL solution  •  polyethylene glycol (Golytely) 4000 mL solution  •  CALCIUM MAGNESIUM 750 PO    No Known Allergies        Objective     Blood pressure 142/59, pulse 74, height 6' (1.829 m), weight 80.5 kg (177 lb 6.4 oz). Body mass index is 24.06 kg/m².      PHYSICAL EXAM:      General Appearance:   Alert, cooperative, no distress   HEENT:   Normocephalic, atraumatic, anicteric.     Neck:  Supple, symmetrical, trachea midline   Lungs:   Clear to auscultation bilaterally; no rales, rhonchi or wheezing; respirations unlabored    Heart::   Regular rate and rhythm; no murmur.   Abdomen:   Soft, non-tender, non-distended; normal bowel sounds; no masses, no organomegaly    Genitalia:   Deferred    Rectal:   Deferred    Extremities:  No cyanosis, clubbing or edema    Skin:  No jaundice, rashes, or lesions    Lymph nodes:  No palpable cervical lymphadenopathy        Lab Results:   No visits with results within 1 Day(s) from this visit.   Latest known visit with results is:   Admission on 2023, Discharged on 2023   Component Date Value   • Color, UA 2023 Yellow    • Clarity, UA 2023 Clear    • Specific [place], UA  03/09/2023 1.020    • pH, UA 03/09/2023 5.5    • Leukocytes, UA 03/09/2023 Negative    • Nitrite, UA 03/09/2023 Negative    • Protein, UA 03/09/2023 Negative    • Glucose, UA 03/09/2023 Negative    • Ketones, UA 03/09/2023 Trace (A)    • Urobilinogen, UA 03/09/2023 0.2    • Bilirubin, UA 03/09/2023 Negative    • Occult Blood, UA 03/09/2023 Negative          Radiology Results:   No results found.

## 2024-10-01 ENCOUNTER — OFFICE VISIT (OUTPATIENT)
Dept: INTERNAL MEDICINE CLINIC | Facility: CLINIC | Age: 79
End: 2024-10-01
Payer: COMMERCIAL

## 2024-10-01 VITALS
WEIGHT: 181 LBS | TEMPERATURE: 99.1 F | DIASTOLIC BLOOD PRESSURE: 60 MMHG | BODY MASS INDEX: 24.52 KG/M2 | HEART RATE: 82 BPM | RESPIRATION RATE: 15 BRPM | OXYGEN SATURATION: 100 % | HEIGHT: 72 IN | SYSTOLIC BLOOD PRESSURE: 116 MMHG

## 2024-10-01 DIAGNOSIS — I10 PRIMARY HYPERTENSION: ICD-10-CM

## 2024-10-01 DIAGNOSIS — Z00.00 ENCOUNTER FOR SUBSEQUENT ANNUAL WELLNESS VISIT (AWV) IN MEDICARE PATIENT: ICD-10-CM

## 2024-10-01 DIAGNOSIS — T45.1X5A CHEMOTHERAPY-INDUCED NEUTROPENIA (HCC): ICD-10-CM

## 2024-10-01 DIAGNOSIS — E83.110 HEREDITARY HEMOCHROMATOSIS (HCC): Primary | ICD-10-CM

## 2024-10-01 DIAGNOSIS — D70.1 CHEMOTHERAPY-INDUCED NEUTROPENIA (HCC): ICD-10-CM

## 2024-10-01 PROCEDURE — 99213 OFFICE O/P EST LOW 20 MIN: CPT | Performed by: INTERNAL MEDICINE

## 2024-10-01 PROCEDURE — G0439 PPPS, SUBSEQ VISIT: HCPCS | Performed by: INTERNAL MEDICINE

## 2024-10-01 RX ORDER — BRIMONIDINE TARTRATE 2 MG/ML
SOLUTION/ DROPS OPHTHALMIC
COMMUNITY
Start: 2024-09-12

## 2024-10-01 NOTE — ASSESSMENT & PLAN NOTE
Patient followed by oncologist on Xeloda last WBC done by Dr. Junie scherer at baseline reviewed  WBC reviewed 3.6 stable asymptomatic    Patient followed by hematologist oncologist and followed and monitor with WBC by him

## 2024-10-01 NOTE — ASSESSMENT & PLAN NOTE
Patient undergoing phlebotomy once a month and monitored by oncology Dr. Webber with ferritin serum iron percentage saturation previous labs reviewed stable continue follow-up with the oncology and phlebotomy symptomatic    Were reviewed asymptomatic awaiting the repeat iron studies including ferritin    Treated with phlebotomy once a month awaiting to be seen by hematologist    Awaiting repeat CBC awaiting to be seen by hematology and iron studies    Will check CMP

## 2024-10-01 NOTE — PATIENT INSTRUCTIONS
Medicare Preventive Visit Patient Instructions  Thank you for completing your Welcome to Medicare Visit or Medicare Annual Wellness Visit today. Your next wellness visit will be due in one year (10/2/2025).  The screening/preventive services that you may require over the next 5-10 years are detailed below. Some tests may not apply to you based off risk factors and/or age. Screening tests ordered at today's visit but not completed yet may show as past due. Also, please note that scanned in results may not display below.  Preventive Screenings:  Service Recommendations Previous Testing/Comments   Colorectal Cancer Screening  Colonoscopy    Fecal Occult Blood Test (FOBT)/Fecal Immunochemical Test (FIT)  Fecal DNA/Cologuard Test  Flexible Sigmoidoscopy Age: 45-75 years old   Colonoscopy: every 10 years (May be performed more frequently if at higher risk)  OR  FOBT/FIT: every 1 year  OR  Cologuard: every 3 years  OR  Sigmoidoscopy: every 5 years  Screening may be recommended earlier than age 45 if at higher risk for colorectal cancer. Also, an individualized decision between you and your healthcare provider will decide whether screening between the ages of 76-85 would be appropriate. Colonoscopy: 04/07/2021  FOBT/FIT: Not on file  Cologuard: Not on file  Sigmoidoscopy: Not on file          Prostate Cancer Screening Individualized decision between patient and health care provider in men between ages of 55-69   Medicare will cover every 12 months beginning on the day after your 50th birthday PSA: No results in last 5 years           Hepatitis C Screening Once for adults born between 1945 and 1965  More frequently in patients at high risk for Hepatitis C Hep C Antibody: Not on file        Diabetes Screening 1-2 times per year if you're at risk for diabetes or have pre-diabetes Fasting glucose: No results in last 5 years (No results in last 5 years)  A1C: 5.3 % (7/22/2024)      Cholesterol Screening Once every 5 years if you  don't have a lipid disorder. May order more often based on risk factors. Lipid panel: 12/21/2021         Other Preventive Screenings Covered by Medicare:  Abdominal Aortic Aneurysm (AAA) Screening: covered once if your at risk. You're considered to be at risk if you have a family history of AAA or a male between the age of 65-75 who smoking at least 100 cigarettes in your lifetime.  Lung Cancer Screening: covers low dose CT scan once per year if you meet all of the following conditions: (1) Age 55-77; (2) No signs or symptoms of lung cancer; (3) Current smoker or have quit smoking within the last 15 years; (4) You have a tobacco smoking history of at least 20 pack years (packs per day x number of years you smoked); (5) You get a written order from a healthcare provider.  Glaucoma Screening: covered annually if you're considered high risk: (1) You have diabetes OR (2) Family history of glaucoma OR (3)  aged 50 and older OR (4)  American aged 65 and older  Osteoporosis Screening: covered every 2 years if you meet one of the following conditions: (1) Have a vertebral abnormality; (2) On glucocorticoid therapy for more than 3 months; (3) Have primary hyperparathyroidism; (4) On osteoporosis medications and need to assess response to drug therapy.  HIV Screening: covered annually if you're between the age of 15-65. Also covered annually if you are younger than 15 and older than 65 with risk factors for HIV infection. For pregnant patients, it is covered up to 3 times per pregnancy.    Immunizations:  Immunization Recommendations   Influenza Vaccine Annual influenza vaccination during flu season is recommended for all persons aged >= 6 months who do not have contraindications   Pneumococcal Vaccine   * Pneumococcal conjugate vaccine = PCV13 (Prevnar 13), PCV15 (Vaxneuvance), PCV20 (Prevnar 20)  * Pneumococcal polysaccharide vaccine = PPSV23 (Pneumovax) Adults 19-63 yo with certain risk factors or  if 65+ yo  If never received any pneumonia vaccine: recommend Prevnar 20 (PCV20)  Give PCV20 if previously received 1 dose of PCV13 or PPSV23   Hepatitis B Vaccine 3 dose series if at intermediate or high risk (ex: diabetes, end stage renal disease, liver disease)   Respiratory syncytial virus (RSV) Vaccine - COVERED BY MEDICARE PART D  * RSVPreF3 (Arexvy) CDC recommends that adults 60 years of age and older may receive a single dose of RSV vaccine using shared clinical decision-making (SCDM)   Tetanus (Td) Vaccine - COST NOT COVERED BY MEDICARE PART B Following completion of primary series, a booster dose should be given every 10 years to maintain immunity against tetanus. Td may also be given as tetanus wound prophylaxis.   Tdap Vaccine - COST NOT COVERED BY MEDICARE PART B Recommended at least once for all adults. For pregnant patients, recommended with each pregnancy.   Shingles Vaccine (Shingrix) - COST NOT COVERED BY MEDICARE PART B  2 shot series recommended in those 19 years and older who have or will have weakened immune systems or those 50 years and older     Health Maintenance Due:  There are no preventive care reminders to display for this patient.  Immunizations Due:      Topic Date Due   • Pneumococcal Vaccine: 65+ Years (1 of 2 - PCV) Never done   • Influenza Vaccine (1) 09/01/2024   • COVID-19 Vaccine (5 - 2023-24 season) 09/01/2024     Advance Directives   What are advance directives?  Advance directives are legal documents that state your wishes and plans for medical care. These plans are made ahead of time in case you lose your ability to make decisions for yourself. Advance directives can apply to any medical decision, such as the treatments you want, and if you want to donate organs.   What are the types of advance directives?  There are many types of advance directives, and each state has rules about how to use them. You may choose a combination of any of the following:  Living will:  This is a  written record of the treatment you want. You can also choose which treatments you do not want, which to limit, and which to stop at a certain time. This includes surgery, medicine, IV fluid, and tube feedings.   Durable power of  for healthcare (DPAHC):  This is a written record that states who you want to make healthcare choices for you when you are unable to make them for yourself. This person, called a proxy, is usually a family member or a friend. You may choose more than 1 proxy.  Do not resuscitate (DNR) order:  A DNR order is used in case your heart stops beating or you stop breathing. It is a request not to have certain forms of treatment, such as CPR. A DNR order may be included in other types of advance directives.  Medical directive:  This covers the care that you want if you are in a coma, near death, or unable to make decisions for yourself. You can list the treatments you want for each condition. Treatment may include pain medicine, surgery, blood transfusions, dialysis, IV or tube feedings, and a ventilator (breathing machine).  Values history:  This document has questions about your views, beliefs, and how you feel and think about life. This information can help others choose the care that you would choose.  Why are advance directives important?  An advance directive helps you control your care. Although spoken wishes may be used, it is better to have your wishes written down. Spoken wishes can be misunderstood, or not followed. Treatments may be given even if you do not want them. An advance directive may make it easier for your family to make difficult choices about your care.       © Copyright [email or web address] 2018 Information is for End User's use only and may not be sold, redistributed or otherwise used for commercial purposes. All illustrations and images included in CareNotes® are the copyrighted property of [employer] or [employer]

## 2024-10-01 NOTE — ASSESSMENT & PLAN NOTE
For now asymptomatic blood pressure very well-controlled no need for any further intervention low-salt diet

## 2024-10-01 NOTE — PROGRESS NOTES
Ambulatory Visit  Name: Asif Mai      : 1945      MRN: [id number]  Encounter Provider: Jay Demarco MD  Encounter Date: 10/1/2024   Encounter department: [facility] INTERNAL MEDICINE    Assessment & Plan  Chemotherapy-induced neutropenia (HCC)  Patient followed by oncologist on Xeloda last WBC done by Dr. Junie scherer at baseline reviewed  WBC reviewed 3.6 stable asymptomatic    Patient followed by hematologist oncologist and followed and monitor with WBC by him         Hereditary hemochromatosis (HCC)  Patient undergoing phlebotomy once a month and monitored by oncology Dr. Webber with ferritin serum iron percentage saturation previous labs reviewed stable continue follow-up with the oncology and phlebotomy symptomatic    Were reviewed asymptomatic awaiting the repeat iron studies including ferritin    Treated with phlebotomy once a month awaiting to be seen by hematologist    Awaiting repeat CBC awaiting to be seen by hematology and iron studies    Will check CMP         Primary hypertension  For now asymptomatic blood pressure very well-controlled no need for any further intervention low-salt diet         Encounter for subsequent annual wellness visit (AWV) in Medicare patient            Preventive health issues were discussed with patient, and age appropriate screening tests were ordered as noted in patient's After Visit Summary. Personalized health advice and appropriate referrals for health education or preventive services given if needed, as noted in patient's After Visit Summary.    History of Present Illness     Came in follow-up chronic medical condition listed visit diagnosis in by Dr. Astudillo for hernia abdominal also followed by oncology hematologist for colorectal cancer and hemochromatosis and having blood work done by him on regular basis for now denies any chest pain difficulty breathing    Complete annual wellness exam done for counseling screening follow-up  recommendations see attached encounter       Patient Care Team:  Jay Demarco MD as PCP - General (Internal Medicine)  Marco Scanlon MD as PCP - PCP-[facility] ([facility])    Review of Systems   Constitutional:  Positive for activity change. Negative for appetite change, chills, diaphoresis, fatigue, fever and unexpected weight change.   HENT:  Negative for congestion, dental problem, drooling, ear discharge, ear pain, facial swelling, hearing loss, mouth sores, nosebleeds, postnasal drip, sinus pressure, sneezing, sore throat, tinnitus, trouble swallowing and voice change.    Eyes:  Negative for photophobia, pain, discharge, redness, itching and visual disturbance.   Respiratory:  Positive for cough. Negative for apnea, choking, chest tightness, shortness of breath, wheezing and stridor.    Cardiovascular:  Negative for chest pain, palpitations and leg swelling.   Gastrointestinal:  Negative for abdominal distention, abdominal pain, anal bleeding, blood in stool, constipation, diarrhea, nausea, rectal pain and vomiting.   Endocrine: Negative for cold intolerance, heat intolerance, polydipsia, polyphagia and polyuria.   Genitourinary:  Negative for decreased urine volume, difficulty urinating, dysuria, enuresis, flank pain, frequency, genital sores, hematuria and urgency.   Musculoskeletal:  Positive for arthralgias. Negative for back pain, gait problem, joint swelling, myalgias, neck pain and neck stiffness.   Skin:  Negative for color change, pallor, rash and wound.   Allergic/Immunologic: Negative.  Negative for environmental allergies, food allergies and immunocompromised state.   Neurological:  Negative for dizziness, tremors, seizures, syncope, facial asymmetry, speech difficulty, weakness, light-headedness, numbness and headaches.   Psychiatric/Behavioral:  Negative for agitation, behavioral problems, confusion, decreased concentration, dysphoric mood, hallucinations, self-injury, sleep  disturbance and suicidal ideas. The patient is not nervous/anxious and is not hyperactive.      Medical History Reviewed by provider this encounter:       Annual Wellness Visit Questionnaire   Asif is here for his Subsequent Wellness visit. Last Medicare Wellness visit information reviewed, patient interviewed and updates made to the record today.      Health Risk Assessment:   Patient rates overall health as very good. Patient feels that their physical health rating is slightly better. Patient is very satisfied with their life. Eyesight was rated as same. Hearing was rated as same. Patient feels that their emotional and mental health rating is much better. Patients states they are never, rarely angry. Patient states they are sometimes unusually tired/fatigued. Pain experienced in the last 7 days has been none. Patient states that he has experienced no weight loss or gain in last 6 months. Weight is stable    Depression Screening:   PHQ-2 Score: 0      Fall Risk Screening:   In the past year, patient has experienced: no history of falling in past year      Home Safety:  Patient does not have trouble with stairs inside or outside of their home. Patient has working smoke alarms and has working carbon monoxide detector. Home safety hazards include: none. No home safety hazards    Nutrition:   Current diet is Regular.     Medications:   Patient is currently taking over-the-counter supplements. OTC medications include: B12, C, D3, magnesium, calcium. Patient is able to manage medications. No problems managing meds    Activities of Daily Living (ADLs)/Instrumental Activities of Daily Living (IADLs):   Walk and transfer into and out of bed and chair?: Yes  Dress and groom yourself?: Yes    Bathe or shower yourself?: Yes    Feed yourself? Yes  Do your laundry/housekeeping?: Yes  Manage your money, pay your bills and track your expenses?: Yes  Make your own meals?: Yes    Do your own shopping?: Yes    ADL comments: Pt is  independent    Previous Hospitalizations:   Any hospitalizations or ED visits within the last 12 months?: No    How many hospitalizations have you had in the last year?: 1-2    Hospitalization Comments: Hip pain    Advance Care Planning:   Living will: Yes    Durable POA for healthcare: Yes    Advanced directive: Yes    Advanced directive counseling given: Yes    Five wishes given: No    Patient declined ACP directive: No    End of Life Decisions reviewed with patient: Yes    Provider agrees with end of life decisions: Yes      Cognitive Screening:   Provider or family/friend/caregiver concerned regarding cognition?: No    PREVENTIVE SCREENINGS      Cardiovascular Screening:    General: Screening Not Indicated and History Lipid Disorder      Diabetes Screening:     General: Screening Current      Colorectal Cancer Screening:     General: History Colorectal Cancer      Prostate Cancer Screening:    General: Screening Not Indicated      Osteoporosis Screening:    General: Screening Not Indicated and History Osteoporosis      Abdominal Aortic Aneurysm (AAA) Screening:    Risk factors include: tobacco use        General: Screening Not Indicated      Lung Cancer Screening:     General: Screening Not Indicated      Hepatitis C Screening:    General: Risks and Benefits Discussed and Patient Declines    Hep C Screening Accepted: No     Screening, Brief Intervention, and Referral to Treatment (SBIRT)    Screening    Typical number of drinks in a week: 6    AUDIT-C Screenin) How often did you have a drink containing alcohol in the past year? 2 to 3 times a week  2) How many drinks did you have on a typical day when you were drinking in the past year? 1 to 2  3) How often did you have 6 or more drinks on one occasion in the past year? never    AUDIT-C Score: 3  Interpretation: Score 0-3 (male): Negative screen for alcohol misuse    Single Item Drug Screening:  How often have you used an illegal drug (including marijuana)  or a prescription medication for non-medical reasons in the past year? never    Single Item Drug Screen Score: 0  Interpretation: Negative screen for possible drug use disorder    Brief Intervention  Alcohol & drug use screenings were reviewed. No concerns regarding substance use disorder identified. Healthy alcohol use/limits discussed.     Other Counseling Topics:   Car/seat belt/driving safety and calcium and vitamin D intake.     Social Determinants of Health     Financial Resource Strain: Low Risk  (6/27/2023)    Overall Financial Resource Strain (CARDIA)     Difficulty of Paying Living Expenses: Not hard at all   Transportation Needs: No Transportation Needs (6/27/2023)    PRAPARE - Transportation     Lack of Transportation (Medical): No     Lack of Transportation (Non-Medical): No     No results found.    Objective     Ht 6' (1.829 m)   BMI 24.06 kg/m²     Physical Exam  Vitals and nursing note reviewed.   Constitutional:       General: He is not in acute distress.     Appearance: He is well-developed. He is not ill-appearing, toxic-appearing or diaphoretic.   HENT:      Head: Normocephalic and atraumatic.      Right Ear: External ear normal.      Left Ear: External ear normal.      Nose: Nose normal.      Mouth/Throat:      Pharynx: No oropharyngeal exudate.   Eyes:      General: Lids are normal. Lids are everted, no foreign bodies appreciated. No scleral icterus.        Right eye: No discharge.         Left eye: No discharge.      Conjunctiva/sclera: Conjunctivae normal.      Pupils: Pupils are equal, round, and reactive to light.   Neck:      Thyroid: No thyromegaly.      Vascular: Normal carotid pulses. No carotid bruit, hepatojugular reflux or JVD.      Trachea: No tracheal tenderness or tracheal deviation.   Cardiovascular:      Rate and Rhythm: Normal rate and regular rhythm.      Pulses: Normal pulses.      Heart sounds: Normal heart sounds. No murmur heard.     No friction rub. No gallop.    Pulmonary:      Effort: Pulmonary effort is normal. No respiratory distress.      Breath sounds: Normal breath sounds. No stridor. No wheezing or rales.   Chest:      Chest wall: No tenderness.   Abdominal:      General: Bowel sounds are normal. There is no distension.      Palpations: Abdomen is soft. There is no mass.      Tenderness: There is no abdominal tenderness. There is no guarding or rebound.   Musculoskeletal:         General: No tenderness or deformity. Normal range of motion.      Cervical back: Normal range of motion and neck supple. No edema, erythema or rigidity. No spinous process tenderness or muscular tenderness. Normal range of motion.   Lymphadenopathy:      Head:      Right side of head: No submental, submandibular, tonsillar, preauricular or posterior auricular adenopathy.      Left side of head: No submental, submandibular, tonsillar, preauricular, posterior auricular or occipital adenopathy.      Cervical: No cervical adenopathy.      Right cervical: No superficial, deep or posterior cervical adenopathy.     Left cervical: No superficial, deep or posterior cervical adenopathy.      Upper Body:      Right upper body: No pectoral adenopathy.      Left upper body: No pectoral adenopathy.   Skin:     General: Skin is warm and dry.      Coloration: Skin is not pale.      Findings: No erythema or rash.   Neurological:      General: No focal deficit present.      Mental Status: He is alert and oriented to person, place, and time.      Cranial Nerves: No cranial nerve deficit.      Sensory: No sensory deficit.      Motor: No tremor, abnormal muscle tone or seizure activity.      Coordination: Coordination normal.      Gait: Gait normal.      Deep Tendon Reflexes: Reflexes are normal and symmetric. Reflexes normal.   Psychiatric:         Behavior: Behavior normal.         Thought Content: Thought content normal.         Judgment: Judgment normal.

## 2024-10-24 ENCOUNTER — ANESTHESIA (OUTPATIENT)
Dept: GASTROENTEROLOGY | Facility: HOSPITAL | Age: 79
End: 2024-10-24
Payer: COMMERCIAL

## 2024-10-24 ENCOUNTER — HOSPITAL ENCOUNTER (OUTPATIENT)
Dept: GASTROENTEROLOGY | Facility: HOSPITAL | Age: 79
Setting detail: OUTPATIENT SURGERY
End: 2024-10-24
Attending: INTERNAL MEDICINE
Payer: COMMERCIAL

## 2024-10-24 ENCOUNTER — ANESTHESIA EVENT (OUTPATIENT)
Dept: GASTROENTEROLOGY | Facility: HOSPITAL | Age: 79
End: 2024-10-24
Payer: COMMERCIAL

## 2024-10-24 VITALS
TEMPERATURE: 98.1 F | OXYGEN SATURATION: 99 % | HEIGHT: 72 IN | WEIGHT: 177 LBS | BODY MASS INDEX: 23.98 KG/M2 | DIASTOLIC BLOOD PRESSURE: 62 MMHG | RESPIRATION RATE: 16 BRPM | SYSTOLIC BLOOD PRESSURE: 125 MMHG | HEART RATE: 68 BPM

## 2024-10-24 DIAGNOSIS — Z86.0101 HISTORY OF ADENOMATOUS POLYP OF COLON: ICD-10-CM

## 2024-10-24 DIAGNOSIS — Z85.048 HISTORY OF RECTAL CANCER: ICD-10-CM

## 2024-10-24 PROCEDURE — 45380 COLONOSCOPY AND BIOPSY: CPT | Performed by: INTERNAL MEDICINE

## 2024-10-24 PROCEDURE — 45385 COLONOSCOPY W/LESION REMOVAL: CPT | Performed by: INTERNAL MEDICINE

## 2024-10-24 PROCEDURE — 88305 TISSUE EXAM BY PATHOLOGIST: CPT | Performed by: PATHOLOGY

## 2024-10-24 RX ORDER — PROPOFOL 10 MG/ML
INJECTION, EMULSION INTRAVENOUS AS NEEDED
Status: DISCONTINUED | OUTPATIENT
Start: 2024-10-24 | End: 2024-10-24

## 2024-10-24 RX ORDER — SODIUM CHLORIDE, SODIUM LACTATE, POTASSIUM CHLORIDE, CALCIUM CHLORIDE 600; 310; 30; 20 MG/100ML; MG/100ML; MG/100ML; MG/100ML
INJECTION, SOLUTION INTRAVENOUS CONTINUOUS PRN
Status: DISCONTINUED | OUTPATIENT
Start: 2024-10-24 | End: 2024-10-24

## 2024-10-24 RX ADMIN — PROPOFOL 80 MG: 10 INJECTION, EMULSION INTRAVENOUS at 12:16

## 2024-10-24 RX ADMIN — PROPOFOL 50 MG: 10 INJECTION, EMULSION INTRAVENOUS at 12:17

## 2024-10-24 RX ADMIN — PROPOFOL 50 MG: 10 INJECTION, EMULSION INTRAVENOUS at 12:28

## 2024-10-24 RX ADMIN — PROPOFOL 50 MG: 10 INJECTION, EMULSION INTRAVENOUS at 12:19

## 2024-10-24 RX ADMIN — SODIUM CHLORIDE, SODIUM LACTATE, POTASSIUM CHLORIDE, AND CALCIUM CHLORIDE: .6; .31; .03; .02 INJECTION, SOLUTION INTRAVENOUS at 12:05

## 2024-10-24 RX ADMIN — PROPOFOL 50 MG: 10 INJECTION, EMULSION INTRAVENOUS at 12:25

## 2024-10-24 RX ADMIN — PROPOFOL 50 MG: 10 INJECTION, EMULSION INTRAVENOUS at 12:22

## 2024-10-24 RX ADMIN — PROPOFOL 50 MG: 10 INJECTION, EMULSION INTRAVENOUS at 12:30

## 2024-10-24 NOTE — ANESTHESIA POSTPROCEDURE EVALUATION
Post-Op Assessment Note    CV Status:  Stable  Pain Score: 0    Pain management: adequate       Mental Status:  Sleepy   Hydration Status:  Stable   PONV Controlled:  None   Airway Patency:  Patent     Post Op Vitals Reviewed: Yes    No anethesia notable event occurred.    Staff: CRNA           Last Filed PACU Vitals:  Vitals Value Taken Time   Temp 96.8 °F (36 °C) 10/24/24 1237   Pulse 71 10/24/24 1237   /54 10/24/24 1237   Resp 20 10/24/24 1237   SpO2 98 % 10/24/24 1237       Modified Brice:  Activity: 0 (10/24/2024 12:37 PM)  Respiration: 1 (10/24/2024 12:37 PM)  Circulation: 1 (10/24/2024 12:37 PM)  Consciousness: 0 (10/24/2024 12:37 PM)  Oxygen Saturation: 2 (10/24/2024 12:37 PM)  Modified Brice Score: 4 (10/24/2024 12:37 PM)

## 2024-10-24 NOTE — ANESTHESIA PREPROCEDURE EVALUATION
Procedure:  COLONOSCOPY    Relevant Problems   CARDIO   (+) Hypercholesteremia   (+) Hypertension      GI/HEPATIC   (+) Bleeding per rectum   (+) Incisional hernia with obstruction but no gangrene   (+) Malignant neoplasm of liver, unspecified liver malignancy type (HCC)   (+) Metastatic colorectal cancer      HEMATOLOGY   (+) Thrombocytopenia (HCC)        Physical Exam    Airway    Mallampati score: II  TM Distance: >3 FB  Neck ROM: full     Dental       Cardiovascular  Cardiovascular exam normal    Pulmonary  Pulmonary exam normal     Other Findings        Anesthesia Plan  ASA Score- 2     Anesthesia Type- IV sedation with anesthesia with ASA Monitors.         Additional Monitors:     Airway Plan:            Plan Factors-    Chart reviewed. EKG reviewed.  Existing labs reviewed. Patient summary reviewed.                  Induction- intravenous.    Postoperative Plan-         Informed Consent- Anesthetic plan and risks discussed with patient.  I personally reviewed this patient with the CRNA. Discussed and agreed on the Anesthesia Plan with the CRNA..

## 2024-10-28 PROCEDURE — 88305 TISSUE EXAM BY PATHOLOGIST: CPT | Performed by: PATHOLOGY

## 2024-10-31 PROBLEM — Z00.00 ENCOUNTER FOR SUBSEQUENT ANNUAL WELLNESS VISIT (AWV) IN MEDICARE PATIENT: Status: RESOLVED | Noted: 2024-10-01 | Resolved: 2024-10-31

## 2024-11-04 ENCOUNTER — TELEPHONE (OUTPATIENT)
Dept: GASTROENTEROLOGY | Facility: CLINIC | Age: 79
End: 2024-11-04

## 2025-02-03 ENCOUNTER — OFFICE VISIT (OUTPATIENT)
Dept: INTERNAL MEDICINE CLINIC | Facility: CLINIC | Age: 80
End: 2025-02-03
Payer: COMMERCIAL

## 2025-02-03 VITALS
WEIGHT: 178 LBS | BODY MASS INDEX: 24.11 KG/M2 | RESPIRATION RATE: 16 BRPM | TEMPERATURE: 98.6 F | HEIGHT: 72 IN | HEART RATE: 73 BPM | DIASTOLIC BLOOD PRESSURE: 56 MMHG | OXYGEN SATURATION: 100 % | SYSTOLIC BLOOD PRESSURE: 126 MMHG

## 2025-02-03 DIAGNOSIS — T45.1X5A CHEMOTHERAPY-INDUCED NEUTROPENIA (HCC): ICD-10-CM

## 2025-02-03 DIAGNOSIS — D69.6 THROMBOCYTOPENIA (HCC): ICD-10-CM

## 2025-02-03 DIAGNOSIS — D70.1 CHEMOTHERAPY-INDUCED NEUTROPENIA (HCC): ICD-10-CM

## 2025-02-03 DIAGNOSIS — E83.110 HEREDITARY HEMOCHROMATOSIS (HCC): ICD-10-CM

## 2025-02-03 DIAGNOSIS — C19 PRIMARY MALIGNANT NEOPLASM OF COLORECTAL AREA WITH METASTASIS (HCC): Primary | ICD-10-CM

## 2025-02-03 PROBLEM — C22.9 MALIGNANT NEOPLASM OF LIVER, UNSPECIFIED LIVER MALIGNANCY TYPE (HCC): Status: RESOLVED | Noted: 2022-11-14 | Resolved: 2025-02-03

## 2025-02-03 PROCEDURE — G2211 COMPLEX E/M VISIT ADD ON: HCPCS | Performed by: INTERNAL MEDICINE

## 2025-02-03 PROCEDURE — 99213 OFFICE O/P EST LOW 20 MIN: CPT | Performed by: INTERNAL MEDICINE

## 2025-02-03 NOTE — ASSESSMENT & PLAN NOTE
Followed by oncology    For now in remission    No signs symptom recurrence    Agree continue main medications follow    Xeloda 500 mg 2 tablets twice a day    Supplemental folic acid    Follow-up with the oncology

## 2025-02-03 NOTE — PROGRESS NOTES
Dr. Demarco's Office Visit Note  25     Asif Mai 79 y.o. male MRN: [id number]  : 1945    Assessment:     1. Metastatic colorectal cancer  Assessment & Plan:  Followed by oncology    For now in remission    No signs symptom recurrence    Agree continue main medications follow    Xeloda 500 mg 2 tablets twice a day    Supplemental folic acid    Follow-up with the oncology  2. Chemotherapy-induced neutropenia (HCC)  Assessment & Plan:  Lab Results   Component Value Date    WBC 5.99 2018    HGB 11.2 (L) 2018    HCT 33.3 (L) 2018     (H) 2018     2018   Above reviewed    Neutropenia resolved    Awaiting repeat CBC    Waiting to be seen by hematology  3. Hereditary hemochromatosis (HCC)  Assessment & Plan:  Patient undergoing phlebotomy once a month and monitored by oncology Dr. Webber with ferritin serum iron percentage saturation previous labs reviewed stable continue follow-up with the oncology and phlebotomy symptomatic    Followed by hematology    Awaiting phlebotomy    Iron studies pending  4. Thrombocytopenia (HCC)  Assessment & Plan:  Lab Results   Component Value Date    WBC 5.99 2018    HGB 11.2 (L) 2018    HCT 33.3 (L) 2018     (H) 2018     2018       Oh reviewed platelet normal    Followed by hematology asymptomatic        Discussion Summary and Plan:  Today's care plan and medications were reviewed with patient in detail and all their questions answered to their satisfaction.    Chief Complaint   Patient presents with   • Follow-up     Feeling good      Subjective:  Came in follow-up chronic medical condition list visit diagnoses denies any chest pain difficulty breathing    Patient is followed by hematology oncology    Awaiting phlebotomy for the treatment for hemochromatosis no new complaint overall health stable at baseline    Patient is having all the blood work done as per patient and all the labs  lipid profile CMP CBC UA by hematology although results not available requested                The following portions of the patient's history were reviewed and updated as appropriate: allergies, current medications, past family history, past medical history, past social history, past surgical history and problem list.    Review of Systems   Constitutional:  Positive for activity change. Negative for appetite change, chills, diaphoresis, fatigue, fever and unexpected weight change.   HENT:  Negative for congestion, dental problem, drooling, ear discharge, ear pain, facial swelling, hearing loss, mouth sores, nosebleeds, postnasal drip, rhinorrhea, sinus pressure, sneezing, sore throat, tinnitus, trouble swallowing and voice change.    Eyes:  Negative for photophobia, pain, discharge, redness, itching and visual disturbance.   Respiratory:  Negative for apnea, cough, choking, chest tightness, shortness of breath, wheezing and stridor.    Cardiovascular:  Negative for chest pain, palpitations and leg swelling.   Gastrointestinal:  Negative for abdominal distention, abdominal pain, anal bleeding, blood in stool, constipation, diarrhea, nausea, rectal pain and vomiting.   Endocrine: Negative for cold intolerance, heat intolerance, polydipsia, polyphagia and polyuria.   Genitourinary:  Negative for decreased urine volume, difficulty urinating, dysuria, enuresis, flank pain, frequency, genital sores, hematuria and urgency.   Musculoskeletal:  Positive for arthralgias. Negative for back pain, gait problem, joint swelling, myalgias, neck pain and neck stiffness.   Skin:  Negative for color change, pallor, rash and wound.   Allergic/Immunologic: Negative.  Negative for environmental allergies, food allergies and immunocompromised state.   Neurological:  Negative for dizziness, tremors, seizures, syncope, facial asymmetry, speech difficulty, weakness, light-headedness, numbness and headaches.   Psychiatric/Behavioral:   Negative for agitation, behavioral problems, confusion, decreased concentration, dysphoric mood, hallucinations, self-injury, sleep disturbance and suicidal ideas. The patient is not nervous/anxious and is not hyperactive.          Historical Information   Patient Active Problem List   Diagnosis   • Hyperglycemia   • Hypertension   • Metastatic colorectal cancer   • Hypercholesteremia   • Bruise of both arms   • Hereditary hemochromatosis (HCC)   • Vitamin D deficiency   • Osteopenia   • Bleeding per rectum   • Incisional hernia with obstruction but no gangrene   • Left inguinal hernia   • Right inguinal hernia   • Other constipation   • Encounter for surgical follow-up care   • AV (angiodysplasia malformation of colon)   • Edema of right lower leg   • Neuropathy   • Chemotherapy-induced neutropenia (HCC)   • Age-related cataract   • Thrombocytopenia (HCC)   • Nuclear sclerotic cataract   • Primary open angle glaucoma of both eyes, moderate stage     Past Medical History:   Diagnosis Date   • Cancer (HCC)     rectal   • Heart murmur    • High cholesterol    • HLD (hyperlipidemia)     border line   • Hx of radiation therapy      Past Surgical History:   Procedure Laterality Date   • COLONOSCOPY     • COLOSTOMY TAKEDOWN/REVERSE ANTON  2018    6 months after   • HARTMANS PROCEDURE  2018   • IR BALLOON-OCCLUDED ANTEGRADE TRANSVENOUS OBLITERATION (BATO)  5/3/2021   • IR PORT REMOVAL  5/7/2021   • LIVER BIOPSY      in 5/2017   • PORTACATH PLACEMENT      in 2016   • WA RPR 1ST INGUN HRNA AGE 5 YRS/> REDUCIBLE Left 5/4/2021    Procedure: REPAIR HERNIA INGUINAL WITH MESH;  Surgeon: Saulo Rosales MD;  Location: [facility] OR;  Service: General     Social History     Substance and Sexual Activity   Alcohol Use Yes    Comment: daily     Social History     Substance and Sexual Activity   Drug Use Never     Social History     Tobacco Use   Smoking Status Former   • Current packs/day: 0.00   • Average packs/day: 1 pack/day for  10.0 years (10.0 ttl pk-yrs)   • Types: Cigarettes   • Start date:    • Quit date:    • Years since quittin.1   Smokeless Tobacco Never   Tobacco Comments    quit 20 years ago     Family History   Problem Relation Age of Onset   • No Known Problems Mother    • No Known Problems Father      Health Maintenance Due   Topic   • Pneumococcal Vaccine: 65+ Years (1 of 2 - PCV)   • Zoster Vaccine (1 of 2)   • RSV Vaccine for Pregnant Patients and Patients Age 60+ Years (1 - 1-dose 75+ series)   • Influenza Vaccine (1)   • COVID-19 Vaccine ( season)      Meds/Allergies       Current Outpatient Medications:   •  ascorbic acid (VITAMIN C) 1000 MG tablet, Take 1,000 mg by mouth, Disp: , Rfl:   •  brimonidine tartrate 0.2 % ophthalmic solution, , Disp: , Rfl:   •  Calcium Carb-Cholecalciferol (Calcium 1000 + D) 1000-800 MG-UNIT TABS, Take 600 mg by mouth 2 (two) times a day, Disp: , Rfl:   •  capecitabine (XELODA) 500 MG tablet, 500 mg 2 (two) times a day On 2 weeks off 1 week.  2 tabs bid, Disp: , Rfl:   •  Cholecalciferol (VITAMIN D3) 3000 units TABS, Take by mouth, Disp: , Rfl:   •  Cyanocobalamin (VITAMIN B 12 PO), Take by mouth, Disp: , Rfl:   •  folic acid (FOLVITE) 1 mg tablet, Take by mouth daily, Disp: , Rfl:   •  Magnesium 400 MG TABS, Take 1 tablet by mouth in the morning, Disp: , Rfl:   •  Multiple Vitamins-Minerals (CENTRUM SILVER PO), Take by mouth, Disp: , Rfl:       Objective:    Vitals:   /56   Pulse 73   Temp 98.6 °F (37 °C)   Resp 16   Ht 6' (1.829 m)   Wt 80.7 kg (178 lb)   SpO2 100%   BMI 24.14 kg/m²   Body mass index is 24.14 kg/m².  Vitals:    25 1046   Weight: 80.7 kg (178 lb)       Physical Exam  Vitals and nursing note reviewed.   Constitutional:       General: He is not in acute distress.     Appearance: He is well-developed. He is toxic-appearing. He is not ill-appearing or diaphoretic.   HENT:      Head: Normocephalic and atraumatic.      Right Ear: External  ear normal.      Left Ear: External ear normal.      Nose: Nose normal.      Mouth/Throat:      Pharynx: No oropharyngeal exudate.   Eyes:      General: Lids are normal. Lids are everted, no foreign bodies appreciated. No scleral icterus.        Right eye: No discharge.         Left eye: No discharge.      Conjunctiva/sclera: Conjunctivae normal.      Pupils: Pupils are equal, round, and reactive to light.   Neck:      Thyroid: No thyromegaly.      Vascular: Normal carotid pulses. No carotid bruit, hepatojugular reflux or JVD.      Trachea: No tracheal tenderness or tracheal deviation.   Cardiovascular:      Rate and Rhythm: Normal rate and regular rhythm.      Pulses: Normal pulses.      Heart sounds: Normal heart sounds. No murmur heard.     No friction rub. No gallop.   Pulmonary:      Effort: Pulmonary effort is normal. No respiratory distress.      Breath sounds: Normal breath sounds. No stridor. No wheezing or rales.   Chest:      Chest wall: No tenderness.   Abdominal:      General: Bowel sounds are normal. There is no distension.      Palpations: Abdomen is soft. There is no mass.      Tenderness: There is no abdominal tenderness. There is no guarding or rebound.   Musculoskeletal:         General: No tenderness or deformity. Normal range of motion.      Cervical back: Normal range of motion and neck supple. No edema, erythema or rigidity. No spinous process tenderness or muscular tenderness. Normal range of motion.   Lymphadenopathy:      Head:      Right side of head: No submental, submandibular, tonsillar, preauricular or posterior auricular adenopathy.      Left side of head: No submental, submandibular, tonsillar, preauricular, posterior auricular or occipital adenopathy.      Cervical: No cervical adenopathy.      Right cervical: No superficial, deep or posterior cervical adenopathy.     Left cervical: No superficial, deep or posterior cervical adenopathy.      Upper Body:      Right upper body: No  pectoral adenopathy.      Left upper body: No pectoral adenopathy.   Skin:     General: Skin is warm and dry.      Coloration: Skin is not pale.      Findings: No erythema or rash.   Neurological:      General: No focal deficit present.      Mental Status: He is alert and oriented to person, place, and time.      Cranial Nerves: No cranial nerve deficit.      Sensory: No sensory deficit.      Motor: No tremor, abnormal muscle tone or seizure activity.      Coordination: Coordination normal.      Gait: Gait normal.      Deep Tendon Reflexes: Reflexes are normal and symmetric. Reflexes normal.   Psychiatric:         Behavior: Behavior normal.         Thought Content: Thought content normal.         Judgment: Judgment normal.         Lab Review   No visits with results within 2 Month(s) from this visit.   Latest known visit with results is:   Hospital Outpatient Visit on 10/24/2024   Component Date Value Ref Range Status   • Case Report 10/24/2024    Final                    Value:Surgical Pathology Report                         Case: [id number]                                  Authorizing Provider:  Bharathi Levy MD           Collected:           10/24/2024 1229              Ordering Location:     [facility]   Received:            10/24/2024 1445                                     Endoscopy                                                                    Pathologist:           Tiana Eid DO                                                     Specimens:   A) - Large Intestine, Transverse Colon, cold bx, transverse colon erythema                          B) - Rectum, cold snare, rectal polyp                                                     • Final Diagnosis 10/24/2024    Final                    Value:A. Colon, Transverse (Erythema), Biopsy:  - Colonic mucosa with reactive epithelial changes.    B. Colon, Rectum (Polyp), Biopsy:  - Hyperplastic polyp.     • Note 10/24/2024    Final         "            Value:The endoscopic report was reviewed.     • Additional Information 10/24/2024    Final                    Value:All reported additional testing was performed with appropriately reactive controls.  These tests were developed and their performance characteristics determined by [facility] or appropriate performing facility, though some tests may be performed on tissues which have not been validated for performance characteristics (such as staining performed on alcohol exposed cell blocks and decalcified tissues).  Results should be interpreted with caution and in the context of the patients’ clinical condition. These tests may not be cleared or approved by the U.S. Food and Drug Administration, though the FDA has determined that such clearance or approval is not necessary. These tests are used for clinical purposes and they should not be regarded as investigational or for research. This laboratory has been approved by CLIA 88, designated as a high-complexity laboratory and is qualified to perform these tests.      Interpretation performed at [facility], [address] [place] [id number]     • Synoptic Checklist 10/24/2024    Final                    Value:                            COLON/RECTUM POLYP FORM - GI - All Specimens                                                                                     :    Other     • Gross Description 10/24/2024    Final                    Value:A. The specimen is received in formalin, labeled with the patient's name and hospital number, and is designated \" transverse colon erythema biopsy\".  The specimen consists of 3 tan tissue fragments measuring 0.2 cm in greatest dimension each.  Entirely submitted.  One screened cassette.    B. The specimen is received in formalin, labeled with the patient's name and hospital number, and is designated \" rectal polyp\".  The specimen consists of a single tan flat and " "elongated tissue fragment measuring 0.8 x 0.4 x 0.1 cm.  The specimen is entirely submitted in a screened cassette.    Note: The estimated total formalin fixation time based upon information provided by the submitting clinician and the standard processing schedule is under 72 hours. Yazmin Joy           Patient Instructions   Pt is symptom free for this problem.  This diagnosis or problem is stable/well controlled  Patient is advised to continue same meds as outlined in medicine list  Pt is advised to continue to follow with specialist if they are following for this problme  Pt should get blood test or other testing as per specialist ( or myself) prior to your next visit.        Jay Demarco MD        \"This note has been constructed using a voice recognition system.Therefore there may be syntax, spelling, and/or grammatical errors. Please call if you have any questions. \"  "

## 2025-02-03 NOTE — ASSESSMENT & PLAN NOTE
Lab Results   Component Value Date    WBC 5.99 02/01/2018    HGB 11.2 (L) 02/01/2018    HCT 33.3 (L) 02/01/2018     (H) 02/01/2018     02/01/2018   Above reviewed    Neutropenia resolved    Awaiting repeat CBC    Waiting to be seen by hematology

## 2025-02-03 NOTE — ASSESSMENT & PLAN NOTE
Lab Results   Component Value Date    WBC 5.99 02/01/2018    HGB 11.2 (L) 02/01/2018    HCT 33.3 (L) 02/01/2018     (H) 02/01/2018     02/01/2018       Oh reviewed platelet normal    Followed by hematology asymptomatic

## 2025-02-03 NOTE — ASSESSMENT & PLAN NOTE
Patient undergoing phlebotomy once a month and monitored by oncology Dr. Webber with ferritin serum iron percentage saturation previous labs reviewed stable continue follow-up with the oncology and phlebotomy symptomatic    Followed by hematology    Awaiting phlebotomy    Iron studies pending

## 2025-05-23 ENCOUNTER — TELEPHONE (OUTPATIENT)
Age: 80
End: 2025-05-23

## 2025-05-23 NOTE — TELEPHONE ENCOUNTER
New Patient      Insurance   Current Insurance? [facility] Medicare    Insurance E-verified? yes     History   Reason for appointment/active symptoms?  Nocturia      Has the patient had any previous Urologist(s)? no      Was the patient seen in the ED? no      Labs/Imaging(Including Out Of Network)? no      Records Requested?   Records Visible in EPIC? yes      Personal history of cancer? yes      Appointment   Office location preference:    ?   Appointment Details   Date:  06/06  Time:  7:40am  Location:  [place]   Provider:  Ghazal   Does the appointment need further review?

## 2025-05-27 NOTE — PROGRESS NOTES
"Name: Asif Mai      : 1945      MRN: [id number]  Encounter Provider: CHINO Naylor  Encounter Date: 2025   Encounter department: [facility] [place]  :  Assessment & Plan  Benign prostatic hyperplasia with nocturia  PVR today in the office 33 mL, no concern for any underlying urinary retention.    Discussed diet lifestyle modification such as limiting bladder irritants and stopping fluid consumption 2 hours prior to bedtime.    Recommend initiating Flomax 0.4 mg, side effect profile discussed.    Discussed obstructive sleep apnea and the pathophysiology that can contribute to nocturia.  I do recommend that if the Flomax is not effective no he follow-up with sleep medicine for further assessment of DK.    Plan to follow-up in 6 to 8 weeks.  Orders:    POCT Measure PVR    tamsulosin (FLOMAX) 0.4 mg; Take 1 capsule (0.4 mg total) by mouth daily with dinner      History of Present Illness   Asif Mai is a 79 y.o. male who presents today to the office as a new patient for further evaluation of nocturia.    Today in the office he states that he is overall doing well.  He reports that for least the last month he feels as though he is getting up very frequently to go to the bathroom.  He states that he usually is every 45 minutes or so.  He denies any daytime urinary symptoms.  He denies any weaker urinary stream, hesitancy.  He reports that he will have occasional postvoid dribbling.  He also reports a \"sore feeling at the tip of his penis at the end of urination\".    He reports that he typically stops drinking fluid about 2 hours prior to bedtime.  He denies any caffeine or alcohol intake in the evening.    He reports that he does not snore while sleeping.  His wife has told him on occasion that she is concerned that he stops breathing while he is sleeping.  He has never had a diagnosis of sleep apnea.    Review of Systems   Constitutional:  Negative for chills " "and fever.   Respiratory: Negative.  Negative for cough and shortness of breath.    Cardiovascular: Negative.  Negative for chest pain.   Gastrointestinal: Negative.  Negative for abdominal distention, abdominal pain, nausea and vomiting.   Genitourinary:  Positive for frequency. Negative for decreased urine volume, difficulty urinating, dysuria, flank pain, hematuria, penile discharge, penile pain, penile swelling, scrotal swelling, testicular pain and urgency.   Skin: Negative.  Negative for rash.   Neurological: Negative.    Hematological:  Negative for adenopathy. Does not bruise/bleed easily.          Objective   There were no vitals taken for this visit.    Physical Exam  Vitals reviewed.   Constitutional:       Appearance: Normal appearance.   HENT:      Head: Normocephalic and atraumatic.     Eyes:      Pupils: Pupils are equal, round, and reactive to light.       Cardiovascular:      Rate and Rhythm: Normal rate.   Pulmonary:      Effort: Pulmonary effort is normal.   Abdominal:      General: Abdomen is flat.      Palpations: Abdomen is soft.     Musculoskeletal:      Cervical back: Normal range of motion.     Skin:     General: Skin is warm and dry.     Neurological:      General: No focal deficit present.      Mental Status: He is alert and oriented to person, place, and time.     Psychiatric:         Mood and Affect: Mood normal.         Behavior: Behavior normal.         Thought Content: Thought content normal.         Judgment: Judgment normal.           Results   No results found for: \"PSA\"  Lab Results   Component Value Date    CALCIUM 8.6 02/01/2018    K 4.6 12/21/2021    CO2 26 12/21/2021     12/21/2021    BUN 13 12/21/2021    CREATININE 1.09 12/21/2021     Lab Results   Component Value Date    WBC 5.99 02/01/2018    HGB 11.2 (L) 02/01/2018    HCT 33.3 (L) 02/01/2018     (H) 02/01/2018     02/01/2018       Office Urine Dip  No results found for this or any previous visit (from " the past hour).

## 2025-06-02 ENCOUNTER — OFFICE VISIT (OUTPATIENT)
Dept: INTERNAL MEDICINE CLINIC | Facility: CLINIC | Age: 80
End: 2025-06-02
Payer: COMMERCIAL

## 2025-06-02 VITALS
DIASTOLIC BLOOD PRESSURE: 64 MMHG | WEIGHT: 179 LBS | BODY MASS INDEX: 24.24 KG/M2 | HEIGHT: 72 IN | OXYGEN SATURATION: 100 % | SYSTOLIC BLOOD PRESSURE: 122 MMHG | HEART RATE: 62 BPM

## 2025-06-02 DIAGNOSIS — E83.110 HEREDITARY HEMOCHROMATOSIS (HCC): Primary | ICD-10-CM

## 2025-06-02 DIAGNOSIS — C19 PRIMARY MALIGNANT NEOPLASM OF COLORECTAL AREA WITH METASTASIS (HCC): ICD-10-CM

## 2025-06-02 DIAGNOSIS — E78.00 HYPERCHOLESTEREMIA: ICD-10-CM

## 2025-06-02 PROCEDURE — 99213 OFFICE O/P EST LOW 20 MIN: CPT | Performed by: INTERNAL MEDICINE

## 2025-06-02 NOTE — PROGRESS NOTES
Name: Asif Mai      : 1945      MRN: [id number]  Encounter Provider: Jay Demarco MD  Encounter Date: 2025   Encounter department: [facility] INTERNAL MEDICINE  :  Assessment & Plan  Hereditary hemochromatosis (HCC)  Patient undergoing phlebotomy once a month and monitored by oncology Dr. Webber with ferritin serum iron percentage saturation previous labs reviewed stable continue follow-up with the oncology and phlebotomy symptomatic    Followed by hematology    Aortic phlebotomy    Previous CBC reviewed    Patient is having CBC done at hematologist office continue follow-up with hematology     Recommended blood work to be done CBC CMP patient reluctant like to have it requested by oncologist  Hypercholesteremia  Previous LDL reviewed minimally elevated patient reluctant to take any statin continue low-fat low-cholesterol diet       Metastatic colorectal cancer  Followed by oncology    For now in remission    No signs symptom recurrence    Agree continue main medications follow    Xeloda 500 mg 2 tablets twice a day    Supplemental folic acid    Patient followed by oncology    Blood work also patient like to have blood work requested by oncologist               History of Present Illness   Came in follow-up chronic medical condition list visit diagnoses denies any chest pain difficulty breathing no new symptoms overall health unchanged followed by oncology for details refer to assessment plan visit diagnosis previous labs reviewed    Review of Systems   Constitutional:  Positive for activity change. Negative for appetite change, chills, diaphoresis, fatigue, fever and unexpected weight change.   HENT:  Negative for congestion, dental problem, drooling, ear discharge, ear pain, facial swelling, hearing loss, mouth sores, nosebleeds, postnasal drip, rhinorrhea, sinus pressure, sneezing, sore throat, tinnitus, trouble swallowing and voice change.    Eyes:  Negative for  photophobia, pain, discharge, redness, itching and visual disturbance.   Respiratory:  Negative for apnea, cough, choking, chest tightness, shortness of breath, wheezing and stridor.    Cardiovascular:  Negative for chest pain, palpitations and leg swelling.   Gastrointestinal:  Negative for abdominal distention, abdominal pain, anal bleeding, blood in stool, constipation, diarrhea, nausea, rectal pain and vomiting.   Endocrine: Negative for cold intolerance, heat intolerance, polydipsia, polyphagia and polyuria.   Genitourinary:  Negative for decreased urine volume, difficulty urinating, dysuria, enuresis, flank pain, frequency, genital sores, hematuria and urgency.   Musculoskeletal:  Positive for arthralgias. Negative for back pain, gait problem, joint swelling, myalgias, neck pain and neck stiffness.   Skin:  Negative for color change, pallor, rash and wound.   Allergic/Immunologic: Negative.  Negative for environmental allergies, food allergies and immunocompromised state.   Neurological:  Negative for dizziness, tremors, seizures, syncope, facial asymmetry, speech difficulty, weakness, light-headedness, numbness and headaches.   Psychiatric/Behavioral:  Negative for agitation, behavioral problems, confusion, decreased concentration, dysphoric mood, hallucinations, self-injury, sleep disturbance and suicidal ideas. The patient is not nervous/anxious and is not hyperactive.        Objective   /64   Pulse 62   Ht 6' (1.829 m)   Wt 81.2 kg (179 lb)   SpO2 100%   BMI 24.28 kg/m²      Physical Exam  Vitals and nursing note reviewed.   Constitutional:       General: He is not in acute distress.     Appearance: He is well-developed. He is not ill-appearing, toxic-appearing or diaphoretic.   HENT:      Head: Normocephalic and atraumatic.      Right Ear: External ear normal.      Left Ear: External ear normal.      Nose: Nose normal.      Mouth/Throat:      Pharynx: No oropharyngeal exudate.     Eyes:       General: Lids are normal. Lids are everted, no foreign bodies appreciated. No scleral icterus.        Right eye: No discharge.         Left eye: No discharge.      Conjunctiva/sclera: Conjunctivae normal.      Pupils: Pupils are equal, round, and reactive to light.     Neck:      Thyroid: No thyromegaly.      Vascular: Normal carotid pulses. No carotid bruit, hepatojugular reflux or JVD.      Trachea: No tracheal tenderness or tracheal deviation.     Cardiovascular:      Rate and Rhythm: Normal rate and regular rhythm.      Pulses: Normal pulses.      Heart sounds: Normal heart sounds. No murmur heard.     No friction rub. No gallop.   Pulmonary:      Effort: Pulmonary effort is normal. No respiratory distress.      Breath sounds: Normal breath sounds. No stridor. No wheezing or rales.   Chest:      Chest wall: No tenderness.   Abdominal:      General: Bowel sounds are normal. There is no distension.      Palpations: Abdomen is soft. There is no mass.      Tenderness: There is no abdominal tenderness. There is no guarding or rebound.     Musculoskeletal:         General: No tenderness or deformity. Normal range of motion.      Cervical back: Normal range of motion and neck supple. No edema, erythema or rigidity. No spinous process tenderness or muscular tenderness. Normal range of motion.   Lymphadenopathy:      Head:      Right side of head: No submental, submandibular, tonsillar, preauricular or posterior auricular adenopathy.      Left side of head: No submental, submandibular, tonsillar, preauricular, posterior auricular or occipital adenopathy.      Cervical: No cervical adenopathy.      Right cervical: No superficial, deep or posterior cervical adenopathy.     Left cervical: No superficial, deep or posterior cervical adenopathy.      Upper Body:      Right upper body: No pectoral adenopathy.      Left upper body: No pectoral adenopathy.     Skin:     General: Skin is warm and dry.      Coloration: Skin is not  pale.      Findings: No erythema or rash.     Neurological:      General: No focal deficit present.      Mental Status: He is alert and oriented to person, place, and time.      Cranial Nerves: No cranial nerve deficit.      Sensory: No sensory deficit.      Motor: No tremor, abnormal muscle tone or seizure activity.      Coordination: Coordination normal.      Gait: Gait normal.      Deep Tendon Reflexes: Reflexes are normal and symmetric. Reflexes normal.     Psychiatric:         Behavior: Behavior normal.         Thought Content: Thought content normal.         Judgment: Judgment normal.

## 2025-06-02 NOTE — ASSESSMENT & PLAN NOTE
Followed by oncology    For now in remission    No signs symptom recurrence    Agree continue main medications follow    Xeloda 500 mg 2 tablets twice a day    Supplemental folic acid    Patient followed by oncology    Blood work also patient like to have blood work requested by oncologist      
Patient undergoing phlebotomy once a month and monitored by oncology Dr. Webber with ferritin serum iron percentage saturation previous labs reviewed stable continue follow-up with the oncology and phlebotomy symptomatic    Followed by hematology    Aortic phlebotomy    Previous CBC reviewed    Patient is having CBC done at hematologist office continue follow-up with hematology     Recommended blood work to be done CBC CMP patient reluctant like to have it requested by oncologist
Previous LDL reviewed minimally elevated patient reluctant to take any statin continue low-fat low-cholesterol diet     
show

## 2025-06-06 ENCOUNTER — OFFICE VISIT (OUTPATIENT)
Dept: UROLOGY | Facility: AMBULATORY SURGERY CENTER | Age: 80
End: 2025-06-06
Payer: COMMERCIAL

## 2025-06-06 VITALS
HEIGHT: 72 IN | DIASTOLIC BLOOD PRESSURE: 56 MMHG | SYSTOLIC BLOOD PRESSURE: 134 MMHG | WEIGHT: 175 LBS | HEART RATE: 70 BPM | OXYGEN SATURATION: 99 % | BODY MASS INDEX: 23.7 KG/M2

## 2025-06-06 DIAGNOSIS — R35.1 NOCTURIA: ICD-10-CM

## 2025-06-06 DIAGNOSIS — R35.1 BENIGN PROSTATIC HYPERPLASIA WITH NOCTURIA: Primary | ICD-10-CM

## 2025-06-06 DIAGNOSIS — N40.1 BENIGN PROSTATIC HYPERPLASIA WITH NOCTURIA: Primary | ICD-10-CM

## 2025-06-06 LAB — POST-VOID RESIDUAL VOLUME, ML POC: 33 ML

## 2025-06-06 PROCEDURE — 51798 US URINE CAPACITY MEASURE: CPT

## 2025-06-06 PROCEDURE — 99204 OFFICE O/P NEW MOD 45 MIN: CPT

## 2025-06-06 RX ORDER — TAMSULOSIN HYDROCHLORIDE 0.4 MG/1
0.4 CAPSULE ORAL
Qty: 30 CAPSULE | Refills: 4 | Status: SHIPPED | OUTPATIENT
Start: 2025-06-06

## 2025-06-25 ENCOUNTER — OFFICE VISIT (OUTPATIENT)
Dept: OBGYN CLINIC | Facility: CLINIC | Age: 80
End: 2025-06-25
Payer: COMMERCIAL

## 2025-06-25 VITALS — HEIGHT: 72 IN | WEIGHT: 175 LBS | BODY MASS INDEX: 23.7 KG/M2

## 2025-06-25 DIAGNOSIS — M54.50 ACUTE LEFT-SIDED LOW BACK PAIN WITHOUT SCIATICA: Primary | ICD-10-CM

## 2025-06-25 DIAGNOSIS — M47.819 FACET ARTHROPATHY: ICD-10-CM

## 2025-06-25 DIAGNOSIS — M51.369 DEGENERATION OF INTERVERTEBRAL DISC OF LUMBAR REGION, UNSPECIFIED WHETHER PAIN PRESENT: ICD-10-CM

## 2025-06-25 PROCEDURE — 99204 OFFICE O/P NEW MOD 45 MIN: CPT | Performed by: FAMILY MEDICINE

## 2025-06-25 NOTE — PROGRESS NOTES
Assessment:     Assessment & Plan  Acute left-sided low back pain without sciatica    Orders:    Ambulatory Referral to [facility]; Future    Facet arthropathy         Degeneration of intervertebral disc of lumbar region, unspecified whether pain present             Diagnosis and Orders:      1. Acute left-sided low back pain without sciatica  Ambulatory Referral to [facility]      2. Facet arthropathy        3. Degeneration of intervertebral disc of lumbar region, unspecified whether pain present          Orders Placed This Encounter   Procedures    Ambulatory Referral to [facility]        Impression:   Low back pain likely secondary to acute exacerbation of chronic lumbar spine degenerative changes and facet arthropathy    Conservative Management   We discussed different treatment options:  Pertinent past medical history of metastatic colorectal cancer.  Followed by oncology.  Currently in remission.  Is on Xeloda 500mg   Ice or Heat Therapy as needed 1-2 times daily for 10-20 minutes. As tolerated.   Over the counter Tylenol and/or NSAIDs  as needed based off your Past Medical Hx. Please follow product label for dosing and maximum limits.    Formal Handout provided on General Information of core exercises  Please range joint through gentle range of motion as tolerated.   Initiate Formal Physical Therapy at any preferred location.  Prescription provided  Recommended ambulation with a cane.      Imaging  (I personally reviewed images in PACS and report):   Reviewed prior xrays obtain in Urgent or Emergency Department. These were reviewed in office with patient today.   03/09/2023 CT lumbar spine:  IMPRESSION:     Lumbar degenerative change most prominent within the facet joints at L4-5 and to a lesser degree L5-S1.  Overall there is no significant canal stenosis or discrete nerve impingement identified.     No acute osseous abnormality.    Procedure  Not appropriate at this time.      Shared decision making, patient agreeable to plan.      Return for Follow up after 6-8 wks of formal therapy.        HPI:   Asif Mai is a 79 y.o. male  who presents for evaluation of   Chief Complaint   Patient presents with    Left Hip - Pain       Injury Related: No     Onset/Mechanism: Left gluteal pain for the last 2 weeks.  Denies any trauma.  Patient does have pertinent past medical history of metastatic colon cancer for which he follows with oncology closely with..  Location: Left lower back/gluteal pain.  Severity: Current severity: 0/10. Max severity: 8/10.  Pain described as: intermittent stabbing, sharp  Radiation: Denies.  Provocative: Running, walking.  Associated symptoms: Denies any numbness down the leg.      Summary of treatment to-date:   No therapy to date for the back or gluteals     Following History Reviewed and Updated   Past Medical History[1]  Past Surgical History[2]  Family History[3]    Social History     Substance and Sexual Activity   Alcohol Use Yes    Comment: daily     Social History     Substance and Sexual Activity   Drug Use Never     Tobacco Use History[4]    Social Drivers of Health     Tobacco Use: Medium Risk (6/6/2025)    Patient History     Smoking Tobacco Use: Former     Smokeless Tobacco Use: Never     Passive Exposure: Not on file   Alcohol Use: Not on file   Financial Resource Strain: Low Risk  (6/27/2023)    Overall Financial Resource Strain (CARDIA)     Difficulty of Paying Living Expenses: Not hard at all   Food Insecurity: No Food Insecurity (10/1/2024)    Nursing - Inadequate Food Risk Classification     Worried About Running Out of Food in the Last Year: Never true     Ran Out of Food in the Last Year: Never true     Ran Out of Food in the Last Year: Not on file   Transportation Needs: No Transportation Needs (10/1/2024)    PRAPARE - Transportation     Lack of Transportation (Medical): No     Lack of Transportation (Non-Medical): No   Physical  Activity: Not on file   Stress: Not on file   Social Connections: Not on file   Intimate Partner Violence: Not on file   Depression: Not at risk (6/2/2025)    PHQ-2     PHQ-2 Score: 0   Housing Stability: Low Risk  (10/1/2024)    Housing Stability Vital Sign     Unable to Pay for Housing in the Last Year: No     Number of Times Moved in the Last Year: 0     Homeless in the Last Year: No   Utilities: Not At Risk (10/1/2024)    [facility] Utilities     Threatened with loss of utilities: No   Health Literacy: Not on file        Allergies[5]    Review of Systems      Review of Systems     Review of Systems   Constitutional: Negative for chills and fever.   HENT: Negative for drooling and sneezing.    Eyes: Negative for redness.   Respiratory: Negative for cough and wheezing.    Gastrointestinal: Negative for vomiting.   Psychiatric/Behavioral: Negative for behavioral problems. The patient is not nervous/anxious.      All other systems negative.   Physical Exam   Physical Exam    Vitals and nursing note reviewed.  Constitutional:   Appearance. Normal Appearance.  Ht 6' (1.829 m)   Wt 79.4 kg (175 lb)   BMI 23.73 kg/m²     Body mass index is 23.73 kg/m².   HENT:  Head: Atraumatic.  Nose: Nose normal  Eyes: Conjunctiva/sclera: Conjunctivae normal.  Cardiovascular:   Rate and Rhythm: Bilateral equal distal pulses  Pulmonary:   Effort: Pulmonary effort is normal  Skin:   General: Skin is warm and dry.  Neurological:   General: No focal deficit present.  Mental Status: Alert and oriented to person, place, and time.   Psychiatric:   Mood and Affect: mood normal.  Behavior: Behavior normal     Musculoskeletal Exam     Ortho Exam       Bilateral HIP and BACK     Inspection:  Gait Gross deformity Erythema Warmth   Normal Negative       TENDERNESS:  Thoracic/Lumbar Spinous Processes Paraspinal Muscles SI Joint: Sacrum DELVIN Greater Trochanteric Bursa   Negative Negative Negative  Negative     LUMBAR Range of Motion:  Flexion Extension  "Sidebending Rotation   Intact Intact Intact Intact     HIP Range of Motion:  Hip Supine Supine Prone Prone   Flexion ER IR ER IR   Intact and symmetrical  Intact and symmetrical  Intact and symmetrical        DERMATOMAL SENSATION:  L1 L2 L3 L4 L5 S1   Intact Intact Intact Intact Intact Intact     STRENGTH (bilateral):  Hip flexion Knee Flexion Knee extension Foot dorsiflexion Great toe extension Foot plantarflexion   5/5 5/5 5/5 5/5 5/5 5/5     SPECIAL TESTS:   B/L Hip  Logroll VIKRAM FADIR Daisy's Popliteal angle Supine straight leg Prone straight leg   Negative Negative Negative    Negative N/A     SI JOINT ASIS COMPRESSION TEST:  STORK TEST:  ELMER'S FINGER: VIKRAM SI PAIN:   Negative  Negative  N/A Negative  Negative      Special Tests:   Domingo test Bicycle test Adductor squeeze Piriformis TEST:   GAENSLIN'S TEST:  Pubalgia   Supine, unaffected hip is hyperflexed Supine, opposite active b/l hip flexion / extension  Seated, hips flexed 45, active activation of adductors  Lies on unaffected hip with knees flexed and abducts against resistance  Hyperflexed unaffected hip, extended hip has downward force applied  Lying supine, perform sit-up   Negative or without hip flexion contracture of contralateral leg   Negative for pain or weakness N/A  Negative for pain at pubis          Neurovascular:  Sensation to light touch Posterior tibial artery   Intact and equal bilaterally Intact and equal bilaterally     (Test not completed if space left blank )           Procedures       Portions of the record may have been created with voice recognition software. Occasional wrong word or \"sound alike\" substitutions may have occurred due to the inherent limitations of voice recognition software. Please review the chart carefully and recognize, using context, where substitutions/typographical errors may have occurred.          [1]   Past Medical History:  Diagnosis Date    Cancer (HCC)     rectal    Heart murmur     High cholesterol "     HLD (hyperlipidemia)     border line    Hx of radiation therapy    [2]   Past Surgical History:  Procedure Laterality Date    COLONOSCOPY      COLOSTOMY TAKEDOWN/REVERSE ANTON  2018    6 months after    HARTMANS PROCEDURE  2018    IR BALLOON-OCCLUDED ANTEGRADE TRANSVENOUS OBLITERATION (BATO)  5/3/2021    IR PORT REMOVAL  2021    LIVER BIOPSY      in 2017    PORTACATH PLACEMENT      in 2016    TN RPR 1ST INGUN HRNA AGE 5 YRS/> REDUCIBLE Left 2021    Procedure: REPAIR HERNIA INGUINAL WITH MESH;  Surgeon: Saulo Rosales MD;  Location: [facility];  Service: General   [3]   Family History  Problem Relation Name Age of Onset    No Known Problems Mother      No Known Problems Father     [4]   Social History  Tobacco Use   Smoking Status Former    Current packs/day: 0.00    Average packs/day: 1 pack/day for 10.0 years (10.0 ttl pk-yrs)    Types: Cigarettes    Start date:     Quit date: 2000    Years since quittin.4   Smokeless Tobacco Never   Tobacco Comments    quit 20 years ago   [5] No Known Allergies

## 2025-06-26 ENCOUNTER — TELEPHONE (OUTPATIENT)
Age: 80
End: 2025-06-26

## 2025-06-26 NOTE — TELEPHONE ENCOUNTER
Caller: Angel    Doctor: Dr. Ramirez     Reason for call: Patient states he was seen by Dr Ramirez yesterday and she had given him a paper with a website to order equipment for his edema. Patient lost the paper. Please return call with website. Thank you.     Call back#: [phone]

## 2025-07-01 ENCOUNTER — EVALUATION (OUTPATIENT)
Dept: PHYSICAL THERAPY | Facility: CLINIC | Age: 80
End: 2025-07-01
Attending: FAMILY MEDICINE
Payer: COMMERCIAL

## 2025-07-01 DIAGNOSIS — M54.50 ACUTE LEFT-SIDED LOW BACK PAIN WITHOUT SCIATICA: Primary | ICD-10-CM

## 2025-07-01 PROCEDURE — 97161 PT EVAL LOW COMPLEX 20 MIN: CPT

## 2025-07-01 NOTE — PROGRESS NOTES
PT Evaluation     Today's date: 2025  Patient name: Asif Mai  : 1945  MRN: [id number]  Referring provider: Harjeet Ramirez*  Dx:   Encounter Diagnosis     ICD-10-CM    1. Acute left-sided low back pain without sciatica  M54.50 Ambulatory Referral to [facility]                     Assessment  Impairments: abnormal coordination, abnormal gait, abnormal muscle firing, abnormal muscle tone, abnormal or restricted ROM, abnormal movement, activity intolerance, impaired physical strength and pain with function  Symptom irritability: low    Assessment details: Pt is a pleasant 79 y.o. male who presents to [facility] PT with acute on chronic L sided low back pain, no specific REJI. Today, he presents with weakness, decreased ROM, impaired balance, new onset of impairment of functional mobility, pain, decreased activity tolerance, and decreased strength. Functionally, he is limited in his ability to stand and ambulate, lift and carry, perform age appropriate recreation and exercise, and perform normal self care. Pt is motivated to improve. Pt will benefit from skilled PT to address the aforementioned deficits and limitations in an effort to maximize pain free functional mobility and overall quality of life. Progress as able with these goals in mind.       Understanding of Dx/Px/POC: good     Prognosis: good    Goals  Short term goals (4 weeks):  1) Pt will improve thoracolumbar mobility deficits by 25% pain free.  2) Pt will improve B LE and core strength deficits by 1/3 grade MMT.  3) Pt will improve pain at worse to <4/10.   4) Pt will initiate and progress HEP w/ special emphasis on functional core control and thoracolumbar mobility.    Long term goals (6 weeks)  1) Pt will improve FOTO to at least 80.  2) Pt will stand and ambulate community distances w/o deficit related to low back.   3) Pt will perform two full weeks of all home activity and self care w/o deficit related to low  back.  4) Pt will be independent and compliant w/ HEP in order to maximize functional benefit of skilled PT following d/c.         Plan  Patient would benefit from: skilled PT  Planned modality interventions: cryotherapy and thermotherapy: hydrocollator packs    Planned therapy interventions: abdominal trunk stabilization, activity modification, ADL retraining, manual therapy, massage, motor coordination training, neuromuscular re-education, patient education, therapeutic training, therapeutic exercise, therapeutic activities, stretching, strengthening, home exercise program, functional ROM exercises, gait training, balance, balance/weight bearing training and body mechanics training    Frequency: 1-2x week  Duration in weeks: 8  Treatment plan discussed with: patient  Plan details: HEP to start: see code        Subjective Evaluation    History of Present Illness  Mechanism of injury: Pt is here for evaluation of acute on chronic L sided low back pain w/ min radicular sx to L posterior hip. Main complaint is pain is w/ stepping on L, pain is less when he is pidgeon toed. Feels the pain when he is on his feet for a prolonged period. Feels that pain has been a bit better lately. Small sensation of dragging L leg, not severe. Wants to stay active and figure out how to manage sx on own. Functional status below:   Quality of life: good    Patient Goals  Patient goals for therapy: increased strength, decreased pain, increased motion and return to sport/leisure activities  Patient goal: learn how to manage sx, learn how to help prevent pain  Pain  Current pain ratin  At best pain ratin  At worst pain ratin  Location: L side low back, posterior hip, buttock  Quality: sharp (sharp and focused)  Relieving factors: rest and change in position (sitting)  Aggravating factors: standing, stair climbing, walking and lifting  Progression: no change    Social Support  Steps to enter house: yes  Stairs in house: yes    Lives in: multiple-level home  Lives with: spouse    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)        Objective     Concurrent Complaints  Negative for bladder dysfunction, bowel dysfunction and saddle (S4) numbness    Postural Observations  Seated posture: fair  Standing posture: poor  Correction of posture: makes symptoms better      Neurological Testing     Sensation     Lumbar   Left   Intact: light touch    Right   Intact: light touch    Active Range of Motion     Additional Active Range of Motion Details  Flex: 50% stiff  Ext: 50%  SB R: 75%  SB L: 75%  Rot R: 50%  Rot L: 50%    *indicates pain    Joint Play     Hypomobile: L1, L2, L3, L4, L5 and S1     Strength/Myotome Testing     Additional Strength Details  LE Strength (R/L)    Hip  Flex 4/5 , 4/5  Ext 4/5 , 4/5  Abd 4/5 , 4/5  Add 4/5 , 4/5    Knee   Flex 4/5 , 4/5  Ext 4/5 , 4/5    Core Strength: 1+/5 w/ form break at 2/5    *Indicates pain    Tests     Additional Tests Details  Manual lumbar traction: no change    Thoracolumbar joint mobility: grossly hypo but mobs improve range and feel good    Functional Testing:   Sit<>stand: UE support on LE w/ poor glute drive     BW squat: TBA    Stairs: TBA    SLS: unable B w/o UE support     MIGUEL ANGEL - good stretch, provides relief  PPU - difficult on arms, good stretch    Ambulation     Ambulation: Level Surfaces     Additional Level Surfaces Ambulation Details  Decreased lumbar rotation, poor WS when changing directions, not antalgic, wandering pattern at times      Flowsheet Rows      Flowsheet Row Most Recent Value   PT/OT G-Codes    Current Score 69   Projected Score 80   FOTO information reviewed Yes               Precautions:        Insurance Eval/ Re-eval POC expires Auth Status Total visits  Start date  Expiration date [facility] MED rep 7/1/25 9/1/25 Same day    $20.47 cost per visit                 Date IE 7/1        Visit Number         Auth                  Manual         DTM  and TPR         Lumbar Mobs teted        Lumbar traction tested                          TherEx         Active w/u         Lower quarter mobility work LTR x10  MIGUEL ANGEL x10  PPU x5  Standing lumbar ext        Spinal mobility          Hip stability                                              Neuro Re-Ed         TrA progressions Isos, march, single leg ext x10-15 each        Bridge  X10         Hip hinge         Squat                                     HEP and POC discussion X10 mins        TherAct         Balance         Stairs         Functional Transfers         Functional Lifting                  Gait Training                                    Modalities         CP           7/1/25:  Access Code: [email or web address]  URL: [email or web address]  Date: 07/01/2025  Prepared by: Russell Wrad    Exercises  - Supine Lower Trunk Rotation  - 1 x daily - 7 x weekly - 3 sets - 10 reps  - Supine March  - 1 x daily - 7 x weekly - 3 sets - 10 reps  - Supine Single Leg Extensions  - 1 x daily - 7 x weekly - 3 sets - 10 reps  - Supine Bridge  - 1 x daily - 7 x weekly - 3 sets - 10 reps  - Prone Press Up On Elbows  - 1 x daily - 7 x weekly - 3 sets - 10 reps  - Prone Press Up  - 1 x daily - 7 x weekly - 3 sets - 10 reps

## 2025-07-07 ENCOUNTER — OFFICE VISIT (OUTPATIENT)
Dept: PHYSICAL THERAPY | Facility: CLINIC | Age: 80
End: 2025-07-07
Attending: FAMILY MEDICINE
Payer: COMMERCIAL

## 2025-07-07 DIAGNOSIS — M54.50 ACUTE LEFT-SIDED LOW BACK PAIN WITHOUT SCIATICA: Primary | ICD-10-CM

## 2025-07-07 PROCEDURE — 97112 NEUROMUSCULAR REEDUCATION: CPT

## 2025-07-07 PROCEDURE — 97110 THERAPEUTIC EXERCISES: CPT

## 2025-07-07 NOTE — PROGRESS NOTES
Daily Note     Today's date: 2025  Patient name: Asif Mai  : 1945  MRN: [id number]  Referring provider: Harjeet Ramirez*  Dx:   Encounter Diagnosis     ICD-10-CM    1. Acute left-sided low back pain without sciatica  M54.50                      Subjective: Pt reports no new complaints. Felt good after last visit, had stomach virus and notes this set him back a little. PPU are difficult because of arm strength, feels good to do that motion though.       Objective: requires cueing to lessen UE support w/ basic LE strength work. Mod correction, fatigues quickly.       Assessment: Tolerated treatment well. Patient demonstrated fatigue post treatment and would benefit from continued PT. Does well w/ exercise progressions, fatigue but no increase in pain by end of session. Will look to increase intensity of core and LE strength progressions at next visit barring setback. Pt feels he is improving.      Plan: Continue per plan of care. Pallof, carries, hip hinge     Precautions:        Insurance Eval/ Re-eval POC expires Auth Status Total visits  Start date  Expiration date [facility] MED rep 25  Auth #: [id number] approved Date Range: 25-25 # of Visits: 16  16 25 $20.47 cost per visit                 Date IE        Visit Number 1 2       Auth                  Manual         DTM and TPR         Lumbar Mobs teted        Lumbar traction tested                          TherEx         Active w/u  B LE for glute/HS w/ intermittent LAD x 5-6 mins        Lower quarter mobility work LTR x10  MIGUEL ANGEL x10  PPU x5  Standing lumbar ext X20  X2-3 min total  On elbows only x8-10 total    Standing lumbar ext 2x10 throughout       Spinal mobility          Hip stability                     Heel raises x20                         Neuro Re-Ed         TrA progressions Isos, march, single leg ext x10-15 each Mini squat UE support 2x10       Bridge  X10  2x10 ad sq       Hip hinge   "       Squat   Step up 6\" and 10\" x10-15 each B                                  HEP and POC discussion X10 mins X5-6 min w/ update       TherAct         Balance         Stairs         Functional Transfers         Functional Lifting                  Gait Training                                    Modalities         CP           7/1/25:  Access Code: [email or web address]  URL: [email or web address]  Date: 07/01/2025  Prepared by: Russell Ward    Exercises  - Supine Lower Trunk Rotation  - 1 x daily - 7 x weekly - 3 sets - 10 reps  - Supine March  - 1 x daily - 7 x weekly - 3 sets - 10 reps  - Supine Single Leg Extensions  - 1 x daily - 7 x weekly - 3 sets - 10 reps  - Supine Bridge  - 1 x daily - 7 x weekly - 3 sets - 10 reps  - Prone Press Up On Elbows  - 1 x daily - 7 x weekly - 3 sets - 10 reps  - Prone Press Up  - 1 x daily - 7 x weekly - 3 sets - 10 reps       "

## 2025-07-10 ENCOUNTER — OFFICE VISIT (OUTPATIENT)
Dept: PHYSICAL THERAPY | Facility: CLINIC | Age: 80
End: 2025-07-10
Attending: FAMILY MEDICINE
Payer: COMMERCIAL

## 2025-07-10 DIAGNOSIS — M54.50 ACUTE LEFT-SIDED LOW BACK PAIN WITHOUT SCIATICA: Primary | ICD-10-CM

## 2025-07-10 PROCEDURE — 97112 NEUROMUSCULAR REEDUCATION: CPT

## 2025-07-10 PROCEDURE — 97110 THERAPEUTIC EXERCISES: CPT

## 2025-07-10 NOTE — PROGRESS NOTES
Daily Note     Today's date: 7/10/2025  Patient name: Asif Mai  : 1945  MRN: [id number]  Referring provider: Harjeet Ramirez*  Dx:   Encounter Diagnosis     ICD-10-CM    1. Acute left-sided low back pain without sciatica  M54.50                      Subjective: Pt reports some fatigue but no increase in pain since last session. Has continued w/ exercises on his own, feels these are helpful.       Objective: requires cueing for upright posture to start all standing exercises, corrects and is able to drive through legs and posterior chain once cued.      Assessment: Tolerated treatment well. Patient demonstrated fatigue post treatment and would benefit from continued PT. Does well w/ exercise progressions today. Fatigue by end, pt notes that he will likely feel this later, but is pleased w/ ability to complete these exercises. Will look to increase intensity barring setback. Focus on activities that promote full body strength in functional patterns that can be replicated at home.       Plan: Continue per plan of care. TRX progressions, carries, hip hinge     Precautions:        Insurance Eval/ Re-eval POC expires Auth Status Total visits  Start date  Expiration date [facility] MED rep 25  Auth #: [id number] approved Date Range: 25-25 # of Visits: 16  16 25 $20.47 cost per visit                 Date IE 7/1 7/7 7/10/25      Visit Number 1 2 3      Auth                  Manual         DTM and TPR         Lumbar Mobs teted        Lumbar traction tested                          TherEx         Active w/u  B LE for glute/HS w/ intermittent LAD x 5-6 mins  X8-10 mins total      Lower quarter mobility work LTR x10  MIGUEL ANGEL x10  PPU x5  Standing lumbar ext X20  X2-3 min total  On elbows only x8-10 total    Standing lumbar ext 2x10 throughout LTR x20  X2-3 mins    PPU from elbows only 2x10      Spinal mobility          Hip stability                     Heel raises x20          "                Neuro Re-Ed         TrA progressions Isos, march, single leg ext x10-15 each Mini squat UE support 2x10 TRX squat 3x8-10 total      Bridge  X10  2x10 ad sq rev      Hip hinge   TRX row 3x8-10      Squat   Step up 6\" and 10\" x10-15 each B rev         Green sh ext x20, iso w/ high march 2x10                        HEP and POC discussion X10 mins X5-6 min w/ update X3-4 mins      TherAct         Balance         Stairs         Functional Transfers         Functional Lifting                  Gait Training                                    Modalities         CP           7/1/25:  Access Code: [email or web address]  URL: [email or web address]  Date: 07/01/2025  Prepared by: Russell Ward    Exercises  - Supine Lower Trunk Rotation  - 1 x daily - 7 x weekly - 3 sets - 10 reps  - Supine March  - 1 x daily - 7 x weekly - 3 sets - 10 reps  - Supine Single Leg Extensions  - 1 x daily - 7 x weekly - 3 sets - 10 reps  - Supine Bridge  - 1 x daily - 7 x weekly - 3 sets - 10 reps  - Prone Press Up On Elbows  - 1 x daily - 7 x weekly - 3 sets - 10 reps  - Prone Press Up  - 1 x daily - 7 x weekly - 3 sets - 10 reps         "

## 2025-07-13 NOTE — PROGRESS NOTES
"Daily Note     Today's date: 2025  Patient name: Asif Mai  : 1945  MRN: [id number]  Referring provider: Harjeet Ramirez*  Dx: No diagnosis found.               Subjective: Pt reports      Objective:       Assessment: Tolerated treatment well. Patient demonstrated fatigue post treatment and would benefit from continued PT      Plan: Continue per plan of care.      Precautions:        Insurance Eval/ Re-eval POC expires Auth Status Total visits  Start date  Expiration date [facility] MED rep 25  Auth #: [id number] approved Date Range: 25-25 # of Visits: 16  16 25 $20.47 cost per visit                 Date IE 7/1 7/7 7/10/25 7/14/25     Visit Number 1 2 3 4     Auth                  Manual         DTM and TPR         Lumbar Mobs teted        Lumbar traction tested                          TherEx         Active w/u  B LE for glute/HS w/ intermittent LAD x 5-6 mins  X8-10 mins total      Lower quarter mobility work LTR x10  MIGUEL ANGEL x10  PPU x5  Standing lumbar ext X20  X2-3 min total  On elbows only x8-10 total    Standing lumbar ext 2x10 throughout LTR x20  X2-3 mins    PPU from elbows only 2x10      Spinal mobility          Hip stability                     Heel raises x20                         Neuro Re-Ed         TrA progressions Isos, march, single leg ext x10-15 each Mini squat UE support 2x10 TRX squat 3x8-10 total      Bridge  X10  2x10 ad sq rev      Hip hinge   TRX row 3x8-10      Squat   Step up 6\" and 10\" x10-15 each B rev         Green sh ext x20, iso w/ high march 2x10                        HEP and POC discussion X10 mins X5-6 min w/ update X3-4 mins      TherAct         Balance         Stairs         Functional Transfers         Functional Lifting                  Gait Training                                    Modalities         CP           25:  Access Code: [email or web address]  URL: [email or web address]  Date: 2025  Prepared by: " Russell Ward    Exercises  - Supine Lower Trunk Rotation  - 1 x daily - 7 x weekly - 3 sets - 10 reps  - Supine March  - 1 x daily - 7 x weekly - 3 sets - 10 reps  - Supine Single Leg Extensions  - 1 x daily - 7 x weekly - 3 sets - 10 reps  - Supine Bridge  - 1 x daily - 7 x weekly - 3 sets - 10 reps  - Prone Press Up On Elbows  - 1 x daily - 7 x weekly - 3 sets - 10 reps  - Prone Press Up  - 1 x daily - 7 x weekly - 3 sets - 10 reps

## 2025-07-14 ENCOUNTER — OFFICE VISIT (OUTPATIENT)
Dept: PHYSICAL THERAPY | Facility: CLINIC | Age: 80
End: 2025-07-14
Attending: FAMILY MEDICINE
Payer: COMMERCIAL

## 2025-07-14 DIAGNOSIS — M54.50 ACUTE LEFT-SIDED LOW BACK PAIN WITHOUT SCIATICA: Primary | ICD-10-CM

## 2025-07-17 ENCOUNTER — OFFICE VISIT (OUTPATIENT)
Dept: PHYSICAL THERAPY | Facility: CLINIC | Age: 80
End: 2025-07-17
Attending: FAMILY MEDICINE
Payer: COMMERCIAL

## 2025-07-17 DIAGNOSIS — M54.50 ACUTE LEFT-SIDED LOW BACK PAIN WITHOUT SCIATICA: Primary | ICD-10-CM

## 2025-07-17 PROCEDURE — 97110 THERAPEUTIC EXERCISES: CPT

## 2025-07-17 PROCEDURE — 97112 NEUROMUSCULAR REEDUCATION: CPT

## 2025-07-17 NOTE — PROGRESS NOTES
Daily Note     Today's date: 2025  Patient name: Asif Mai  : 1945  MRN: [id number]  Referring provider: Harjeet Ramirez*  Dx:   Encounter Diagnosis     ICD-10-CM    1. Acute left-sided low back pain without sciatica  M54.50           Start Time: 1145  Stop Time: 1218  Total time in clinic (min): 33 minutes    Subjective: Pt reports that low back feels pretty good today. Hips feel like they need to be stronger, sometimes feels off balance.       Objective:   Balance screen:  FT - 20 sec w/ increased ankle strategy  FT, EC - requires CG, 5 sec before reach for support    Tandem: unable B w/o UE support  Tandem offset: x10-15 sec before reach for UE support       Assessment: Tolerated treatment well. Patient demonstrated fatigue post treatment, exhibited good technique with therapeutic exercises, and would benefit from continued PT. Show pt very gentle floor to stand transfer techniques that are safe for home, good understanding and safe to perform on own. Reviewed balance progressions, what contributes to balance and how this can be addressed. Given simple techniques to start w/ at home. Highlighted importance of ensuring that he has UE support available when performing these, good understanding. Added to HEP.       Plan: Continue per plan of care. Review balance, single leg strength as able     Precautions:        Insurance Eval/ Re-eval POC expires Auth Status Total visits  Start date  Expiration date [facility] MED rep 25  Auth #: [id number] approved Date Range: 25-25 # of Visits: 16  16 25 $20.47 cost per visit                 Date IE 7/1 7/7 7/10/25 7/14/25     Visit Number 1 2 3 4     Auth                  Manual         DTM and TPR         Lumbar Mobs teted        Lumbar traction tested                          TherEx         Active w/u  B LE for glute/HS w/ intermittent LAD x 5-6 mins  X8-10 mins total X6 mins total     Lower quarter mobility work  "LTR x10  MIGUEL ANGEL x10  PPU x5  Standing lumbar ext X20  X2-3 min total  On elbows only x8-10 total    Standing lumbar ext 2x10 throughout LTR x20  X2-3 mins    PPU from elbows only 2x10 LTR x20-30      rev     Spinal mobility          Hip stability                     Heel raises x20  x20         Leg press 135# 4x8-10 reps              Neuro Re-Ed         TrA progressions Isos, march, single leg ext x10-15 each Mini squat UE support 2x10 TRX squat 3x8-10 total Rev    Split squat w/ UE support 2x8 B w/ progressions     Bridge  X10  2x10 ad sq rev      Hip hinge   TRX row 3x8-10      Squat   Step up 6\" and 10\" x10-15 each B rev         Green sh ext x20, iso w/ high march 2x10          Balance    Tandem, feet together, eyes closed - 5 mins              HEP and POC discussion X10 mins X5-6 min w/ update X3-4 mins X5 mins w/ update     TherAct         Balance         Stairs         Functional Transfers         Functional Lifting                  Gait Training                                    Modalities         CP           7/1/25:  Access Code: [email or web address]  URL: [email or web address]  Date: 07/01/2025  Prepared by: Russell Ward    Exercises  - Supine Lower Trunk Rotation  - 1 x daily - 7 x weekly - 3 sets - 10 reps  - Supine March  - 1 x daily - 7 x weekly - 3 sets - 10 reps  - Supine Single Leg Extensions  - 1 x daily - 7 x weekly - 3 sets - 10 reps  - Supine Bridge  - 1 x daily - 7 x weekly - 3 sets - 10 reps  - Prone Press Up On Elbows  - 1 x daily - 7 x weekly - 3 sets - 10 reps  - Prone Press Up  - 1 x daily - 7 x weekly - 3 sets - 10 reps             "

## 2025-07-21 ENCOUNTER — OFFICE VISIT (OUTPATIENT)
Dept: PHYSICAL THERAPY | Facility: CLINIC | Age: 80
End: 2025-07-21
Attending: FAMILY MEDICINE
Payer: COMMERCIAL

## 2025-07-21 DIAGNOSIS — M54.50 ACUTE LEFT-SIDED LOW BACK PAIN WITHOUT SCIATICA: Primary | ICD-10-CM

## 2025-07-21 PROCEDURE — 97110 THERAPEUTIC EXERCISES: CPT

## 2025-07-21 PROCEDURE — 97112 NEUROMUSCULAR REEDUCATION: CPT

## 2025-07-21 NOTE — PROGRESS NOTES
Daily Note     Today's date: 2025  Patient name: Asif Mai  : 1945  MRN: [id number]  Referring provider: Harjeet Ramirez*  Dx:   Encounter Diagnosis     ICD-10-CM    1. Acute left-sided low back pain without sciatica  M54.50                      Subjective: Pt reports some stiffness today but no specific pain. Felt good after last visit.       Objective: requires cueing w/ marching to promote upright posture and proper pacing. Corrects and is able to maintain.       Assessment: Tolerated treatment well. Patient demonstrated fatigue post treatment and would benefit from continued PT. Does well w/ strength and balance progressions. Will look to increase intensity slowly, focus on form vs excessive reps. Good tolerance to dynamic progressions, will look to vary progressions in coming visits.      Plan: Continue per plan of care. Single leg press, slider progressions, carry progressions, TRX work, walking w/ head turns     Precautions:        Insurance Eval/ Re-eval POC expires Auth Status Total visits  Start date  Expiration date [facility] MED rep 25  Auth #: [id number] approved Date Range: 25-25 # of Visits: 16  16 25 $20.47 cost per visit                 Date IE 7/1 7/7 7/10/25 7/14/25 7/21/25    Visit Number 1 2 3 4 5    Auth                  Manual         DTM and TPR         Lumbar Mobs teted        Lumbar traction tested                          TherEx         Active w/u  B LE for glute/HS w/ intermittent LAD x 5-6 mins  X8-10 mins total X6 mins total X6 mins    Lower quarter mobility work LTR x10  MIGUEL ANGEL x10  PPU x5  Standing lumbar ext X20  X2-3 min total  On elbows only x8-10 total    Standing lumbar ext 2x10 throughout LTR x20  X2-3 mins    PPU from elbows only 2x10 LTR x20-30      rev LTR x20    Spinal mobility      MWM L hip flex and IR x20 each    Hip stability                     Heel raises x20  x20 x20        Leg press 135# 4x8-10 reps NV        "      Neuro Re-Ed         TrA progressions Isos, march, single leg ext x10-15 each Mini squat UE support 2x10 TRX squat 3x8-10 total Rev    Split squat w/ UE support 2x8 B w/ progressions rev    Bridge  X10  2x10 ad sq rev  Marching 18# KB 40'x6 each    Hip hinge   TRX row 3x8-10      Squat   Step up 6\" and 10\" x10-15 each B rev  Step up w/ contra drive 8\" w/ min UE/CG - 2x10-12    Glute med tap 12\" x10-15       Green sh ext x20, iso w/ high march 2x10          Balance    Tandem, feet together, eyes closed - 5 mins rev         Sliders post/lat w/ UE support 2x10    HEP and POC discussion X10 mins X5-6 min w/ update X3-4 mins X5 mins w/ update X2-3 mins    TherAct         Balance         Stairs         Functional Transfers         Functional Lifting                  Gait Training                                    Modalities         CP           7/1/25:  Access Code: [email or web address]  URL: [email or web address]  Date: 07/01/2025  Prepared by: Russell Ward    Exercises  - Supine Lower Trunk Rotation  - 1 x daily - 7 x weekly - 3 sets - 10 reps  - Supine March  - 1 x daily - 7 x weekly - 3 sets - 10 reps  - Supine Single Leg Extensions  - 1 x daily - 7 x weekly - 3 sets - 10 reps  - Supine Bridge  - 1 x daily - 7 x weekly - 3 sets - 10 reps  - Prone Press Up On Elbows  - 1 x daily - 7 x weekly - 3 sets - 10 reps  - Prone Press Up  - 1 x daily - 7 x weekly - 3 sets - 10 reps               "

## 2025-07-24 ENCOUNTER — OFFICE VISIT (OUTPATIENT)
Dept: PHYSICAL THERAPY | Facility: CLINIC | Age: 80
End: 2025-07-24
Attending: FAMILY MEDICINE
Payer: COMMERCIAL

## 2025-07-24 DIAGNOSIS — M54.50 ACUTE LEFT-SIDED LOW BACK PAIN WITHOUT SCIATICA: Primary | ICD-10-CM

## 2025-07-24 PROCEDURE — 97112 NEUROMUSCULAR REEDUCATION: CPT

## 2025-07-24 PROCEDURE — 97110 THERAPEUTIC EXERCISES: CPT

## 2025-07-24 NOTE — PROGRESS NOTES
Daily Note     Today's date: 2025  Patient name: Asif Mai  : 1945  MRN: [id number]  Referring provider: Harjeet Ramirez*  Dx:   Encounter Diagnosis     ICD-10-CM    1. Acute left-sided low back pain without sciatica  M54.50                      Subjective: Pt reports that he is stiff today, felt good after last visit. Notes that he had some soreness in the days that followed, thinks this was do to exercise.      Objective: requires CG during sh ext iso march, multiple small LOB that are corrected w/o assist.       Assessment: Tolerated treatment well. Patient demonstrated fatigue post treatment and would benefit from continued PT. Does well w/ exercise progressions. Fatigue but no increase in pain by end of session. Good tolerance to strength work, to mobility work. Kept session light so as not to exacerbate sx. Discussed possible referral to neuro PT for balance. Will discuss w/ MD. Interested in possibly extending PT into August on 1x/week basis. Follow up about this next time.       Plan: Continue per plan of care. Combo of balance and core work as able     Precautions:        Insurance Eval/ Re-eval POC expires Auth Status Total visits  Start date  Expiration date [facility] MED rep 25  Auth #: [id number] approved Date Range: 25-25 # of Visits: 16  16 25 $20.47 cost per visit                 Date IE 7/1 7/7 7/10/25 7/14/25 7/21/25 7/24/25   Visit Number 1 2 3 4 5 6   Auth                  Manual         DTM and TPR         Lumbar Mobs teted        Lumbar traction tested                          TherEx         Active w/u  B LE for glute/HS w/ intermittent LAD x 5-6 mins  X8-10 mins total X6 mins total X6 mins X8 mins   Lower quarter mobility work LTR x10  MIGUEL ANGEL x10  PPU x5  Standing lumbar ext X20  X2-3 min total  On elbows only x8-10 total    Standing lumbar ext 2x10 throughout LTR x20  X2-3 mins    PPU from elbows only 2x10 LTR x20-30      rev LTR x20  "X20-30      MIGUEL ANGEL 3x2 mins     PPU 2x8-10 to elbows   Spinal mobility      MWM L hip flex and IR x20 each    Hip stability                     Heel raises x20  x20 x20        Leg press 135# 4x8-10 reps NV             Neuro Re-Ed         TrA progressions Isos, march, single leg ext x10-15 each Mini squat UE support 2x10 TRX squat 3x8-10 total Rev    Split squat w/ UE support 2x8 B w/ progressions rev Isos and march x20   Bridge  X10  2x10 ad sq rev  Marching 18# KB 40'x6 each    Hip hinge   TRX row 3x8-10      Squat   Step up 6\" and 10\" x10-15 each B rev  Step up w/ contra drive 8\" w/ min UE/CG - 2x10-12    Glute med tap 12\" x10-15       Green sh ext x20, iso w/ high march 2x10   Sh ext x20 green, iso hold w/ march x30       Balance    Tandem, feet together, eyes closed - 5 mins rev Rev w/ progressions for home x 2-3 mins        Sliders post/lat w/ UE support 2x10    HEP and POC discussion X10 mins X5-6 min w/ update X3-4 mins X5 mins w/ update X2-3 mins X3-4 mins   TherAct         Balance         Stairs         Functional Transfers         Functional Lifting                  Gait Training                                    Modalities         CP           7/1/25:  Access Code: [email or web address]  URL: [email or web address]  Date: 07/01/2025  Prepared by: Russell Ward    Exercises  - Supine Lower Trunk Rotation  - 1 x daily - 7 x weekly - 3 sets - 10 reps  - Supine March  - 1 x daily - 7 x weekly - 3 sets - 10 reps  - Supine Single Leg Extensions  - 1 x daily - 7 x weekly - 3 sets - 10 reps  - Supine Bridge  - 1 x daily - 7 x weekly - 3 sets - 10 reps  - Prone Press Up On Elbows  - 1 x daily - 7 x weekly - 3 sets - 10 reps  - Prone Press Up  - 1 x daily - 7 x weekly - 3 sets - 10 reps                 "

## 2025-07-28 ENCOUNTER — OFFICE VISIT (OUTPATIENT)
Dept: PHYSICAL THERAPY | Facility: CLINIC | Age: 80
End: 2025-07-28
Attending: FAMILY MEDICINE
Payer: COMMERCIAL

## 2025-07-28 DIAGNOSIS — M54.50 ACUTE LEFT-SIDED LOW BACK PAIN WITHOUT SCIATICA: Primary | ICD-10-CM

## 2025-07-28 PROCEDURE — 97110 THERAPEUTIC EXERCISES: CPT

## 2025-07-28 PROCEDURE — 97112 NEUROMUSCULAR REEDUCATION: CPT

## 2025-07-29 PROBLEM — N40.1 BENIGN PROSTATIC HYPERPLASIA WITH NOCTURIA: Status: ACTIVE | Noted: 2025-07-29

## 2025-07-29 PROBLEM — R35.1 BENIGN PROSTATIC HYPERPLASIA WITH NOCTURIA: Status: ACTIVE | Noted: 2025-07-29

## 2025-07-31 ENCOUNTER — OFFICE VISIT (OUTPATIENT)
Dept: UROLOGY | Facility: AMBULATORY SURGERY CENTER | Age: 80
End: 2025-07-31
Payer: COMMERCIAL

## 2025-07-31 ENCOUNTER — EVALUATION (OUTPATIENT)
Dept: PHYSICAL THERAPY | Facility: CLINIC | Age: 80
End: 2025-07-31
Attending: FAMILY MEDICINE
Payer: COMMERCIAL

## 2025-07-31 VITALS
DIASTOLIC BLOOD PRESSURE: 52 MMHG | HEART RATE: 65 BPM | SYSTOLIC BLOOD PRESSURE: 110 MMHG | WEIGHT: 175 LBS | BODY MASS INDEX: 23.7 KG/M2 | OXYGEN SATURATION: 98 % | HEIGHT: 72 IN

## 2025-07-31 DIAGNOSIS — M54.50 ACUTE LEFT-SIDED LOW BACK PAIN WITHOUT SCIATICA: Primary | ICD-10-CM

## 2025-07-31 DIAGNOSIS — R35.1 BENIGN PROSTATIC HYPERPLASIA WITH NOCTURIA: Primary | ICD-10-CM

## 2025-07-31 DIAGNOSIS — N40.1 BENIGN PROSTATIC HYPERPLASIA WITH NOCTURIA: Primary | ICD-10-CM

## 2025-07-31 PROCEDURE — 99213 OFFICE O/P EST LOW 20 MIN: CPT

## 2025-07-31 PROCEDURE — 97112 NEUROMUSCULAR REEDUCATION: CPT

## 2025-07-31 PROCEDURE — 97110 THERAPEUTIC EXERCISES: CPT

## 2025-07-31 RX ORDER — TAMSULOSIN HYDROCHLORIDE 0.4 MG/1
0.4 CAPSULE ORAL
Qty: 90 CAPSULE | Refills: 3 | Status: SHIPPED | OUTPATIENT
Start: 2025-07-31

## (undated) DEVICE — SUT CHROMIC 2-0 CT-1 27 IN 811H

## (undated) DEVICE — FABRIC REINFORCED, SURGICAL GOWN, XL: Brand: CONVERTORS

## (undated) DEVICE — SPONGE GAUZE 4 X 8 12 PLY STRL LF

## (undated) DEVICE — TIBURON TRANSVERSE LAPAROTOMY SHEET: Brand: CONVERTORS

## (undated) DEVICE — CHLORAPREP HI-LITE 26ML ORANGE

## (undated) DEVICE — GLOVE SRG BIOGEL 7

## (undated) DEVICE — SUT CHROMIC 0 CT 36 IN 914H

## (undated) DEVICE — DRAPE UTILITY

## (undated) DEVICE — SUT PROLENE 2-0 CT-2 30 IN 8411H

## (undated) DEVICE — GLOVE INDICATOR PI UNDERGLOVE SZ 7.5 BLUE

## (undated) DEVICE — PACK GENERAL LF

## (undated) DEVICE — SUT MONOCRYL 4-0 PC-5 18 IN Y823G

## (undated) DEVICE — BASIC DOUBLE BASIN 2-LF: Brand: MEDLINE INDUSTRIES, INC.

## (undated) DEVICE — STRL PENROSE DRAIN 18" X 1/4": Brand: CARDINAL HEALTH

## (undated) DEVICE — 3M™ TEGADERM™ TRANSPARENT FILM DRESSING FRAME STYLE, 1626W, 4 IN X 4-3/4 IN (10 CM X 12 CM), 50/CT 4CT/CASE: Brand: 3M™ TEGADERM™

## (undated) DEVICE — SUT VICRYL 2-0 18 IN J905T

## (undated) DEVICE — SUT PDS II 0 CT-2 27 IN Z334H

## (undated) DEVICE — BAG DECANTER

## (undated) DEVICE — SCD SEQUENTIAL COMPRESSION COMFORT SLEEVE MEDIUM KNEE LENGTH: Brand: KENDALL SCD

## (undated) DEVICE — 3M™ STERI-STRIP™ REINFORCED ADHESIVE SKIN CLOSURES, R1547, 1/2 IN X 4 IN (12 MM X 100 MM), 6 STRIPS/ENVELOPE: Brand: 3M™ STERI-STRIP™